# Patient Record
Sex: MALE | Race: BLACK OR AFRICAN AMERICAN | NOT HISPANIC OR LATINO | Employment: OTHER | URBAN - METROPOLITAN AREA
[De-identification: names, ages, dates, MRNs, and addresses within clinical notes are randomized per-mention and may not be internally consistent; named-entity substitution may affect disease eponyms.]

---

## 2017-01-13 ENCOUNTER — LAB VISIT (OUTPATIENT)
Dept: LAB | Facility: HOSPITAL | Age: 82
End: 2017-01-13
Attending: INTERNAL MEDICINE
Payer: MEDICARE

## 2017-01-13 ENCOUNTER — OFFICE VISIT (OUTPATIENT)
Dept: INTERNAL MEDICINE | Facility: CLINIC | Age: 82
End: 2017-01-13
Payer: MEDICARE

## 2017-01-13 ENCOUNTER — IMMUNIZATION (OUTPATIENT)
Dept: INTERNAL MEDICINE | Facility: CLINIC | Age: 82
End: 2017-01-13
Payer: MEDICARE

## 2017-01-13 VITALS
DIASTOLIC BLOOD PRESSURE: 50 MMHG | SYSTOLIC BLOOD PRESSURE: 110 MMHG | BODY MASS INDEX: 27.14 KG/M2 | HEIGHT: 66 IN | HEART RATE: 64 BPM | WEIGHT: 168.88 LBS

## 2017-01-13 DIAGNOSIS — R09.89 LEFT CAROTID BRUIT: ICD-10-CM

## 2017-01-13 DIAGNOSIS — E78.5 HYPERLIPIDEMIA, UNSPECIFIED HYPERLIPIDEMIA TYPE: ICD-10-CM

## 2017-01-13 DIAGNOSIS — I15.2 HYPERTENSION ASSOCIATED WITH DIABETES: Chronic | ICD-10-CM

## 2017-01-13 DIAGNOSIS — I25.10 CORONARY ARTERY DISEASE INVOLVING NATIVE CORONARY ARTERY OF NATIVE HEART WITHOUT ANGINA PECTORIS: ICD-10-CM

## 2017-01-13 DIAGNOSIS — N18.30 CKD (CHRONIC KIDNEY DISEASE), STAGE III: ICD-10-CM

## 2017-01-13 DIAGNOSIS — I50.32 CHRONIC DIASTOLIC HEART FAILURE: ICD-10-CM

## 2017-01-13 DIAGNOSIS — F02.80 DEMENTIA ASSOCIATED WITH OTHER UNDERLYING DISEASE WITHOUT BEHAVIORAL DISTURBANCE: Chronic | ICD-10-CM

## 2017-01-13 DIAGNOSIS — C61 PROSTATE CANCER: ICD-10-CM

## 2017-01-13 DIAGNOSIS — Z76.0 PRESCRIPTION REFILL: ICD-10-CM

## 2017-01-13 DIAGNOSIS — Z95.1 S/P CABG X 1: ICD-10-CM

## 2017-01-13 DIAGNOSIS — Z00.00 ANNUAL PHYSICAL EXAM: Primary | ICD-10-CM

## 2017-01-13 DIAGNOSIS — Z23 NEED FOR PNEUMOCOCCAL VACCINATION: ICD-10-CM

## 2017-01-13 DIAGNOSIS — I25.10 CORONARY ARTERY DISEASE DUE TO LIPID RICH PLAQUE: ICD-10-CM

## 2017-01-13 DIAGNOSIS — I25.83 CORONARY ARTERY DISEASE DUE TO LIPID RICH PLAQUE: ICD-10-CM

## 2017-01-13 DIAGNOSIS — E11.59 HYPERTENSION ASSOCIATED WITH DIABETES: Chronic | ICD-10-CM

## 2017-01-13 DIAGNOSIS — Z00.00 ANNUAL PHYSICAL EXAM: ICD-10-CM

## 2017-01-13 LAB
ALBUMIN SERPL BCP-MCNC: 2.9 G/DL
ALP SERPL-CCNC: 135 U/L
ALT SERPL W/O P-5'-P-CCNC: 6 U/L
ANION GAP SERPL CALC-SCNC: 6 MMOL/L
AST SERPL-CCNC: 15 U/L
BASOPHILS # BLD AUTO: 0.05 K/UL
BASOPHILS NFR BLD: 1 %
BILIRUB SERPL-MCNC: 1 MG/DL
BILIRUB UR QL STRIP: NEGATIVE
BUN SERPL-MCNC: 16 MG/DL
CALCIUM SERPL-MCNC: 9.2 MG/DL
CHLORIDE SERPL-SCNC: 104 MMOL/L
CHOLEST/HDLC SERPL: 2.6 {RATIO}
CLARITY UR REFRACT.AUTO: CLEAR
CO2 SERPL-SCNC: 28 MMOL/L
COLOR UR AUTO: YELLOW
CREAT SERPL-MCNC: 1.7 MG/DL
CREAT UR-MCNC: 78 MG/DL
DIFFERENTIAL METHOD: ABNORMAL
EOSINOPHIL # BLD AUTO: 0.2 K/UL
EOSINOPHIL NFR BLD: 3.6 %
ERYTHROCYTE [DISTWIDTH] IN BLOOD BY AUTOMATED COUNT: 14.5 %
EST. GFR  (AFRICAN AMERICAN): 39.6 ML/MIN/1.73 M^2
EST. GFR  (NON AFRICAN AMERICAN): 34.2 ML/MIN/1.73 M^2
GLUCOSE SERPL-MCNC: 90 MG/DL
GLUCOSE UR QL STRIP: NEGATIVE
HCT VFR BLD AUTO: 36.4 %
HDL/CHOLESTEROL RATIO: 38.4 %
HDLC SERPL-MCNC: 112 MG/DL
HDLC SERPL-MCNC: 43 MG/DL
HGB BLD-MCNC: 11.5 G/DL
HGB UR QL STRIP: NEGATIVE
KETONES UR QL STRIP: NEGATIVE
LDLC SERPL CALC-MCNC: 53 MG/DL
LEUKOCYTE ESTERASE UR QL STRIP: NEGATIVE
LYMPHOCYTES # BLD AUTO: 1.2 K/UL
LYMPHOCYTES NFR BLD: 24.4 %
MCH RBC QN AUTO: 29.1 PG
MCHC RBC AUTO-ENTMCNC: 31.6 %
MCV RBC AUTO: 92 FL
MICROALBUMIN UR DL<=1MG/L-MCNC: 149 UG/ML
MICROALBUMIN/CREATININE RATIO: 191 UG/MG
MONOCYTES # BLD AUTO: 0.5 K/UL
MONOCYTES NFR BLD: 10.7 %
NEUTROPHILS # BLD AUTO: 3 K/UL
NEUTROPHILS NFR BLD: 60.3 %
NITRITE UR QL STRIP: NEGATIVE
NONHDLC SERPL-MCNC: 69 MG/DL
PH UR STRIP: 5 [PH] (ref 5–8)
PLATELET # BLD AUTO: 167 K/UL
PMV BLD AUTO: 11 FL
POTASSIUM SERPL-SCNC: 4.2 MMOL/L
PROT SERPL-MCNC: 6.8 G/DL
PROT UR QL STRIP: ABNORMAL
RBC # BLD AUTO: 3.95 M/UL
SODIUM SERPL-SCNC: 138 MMOL/L
SP GR UR STRIP: 1.01 (ref 1–1.03)
TRIGL SERPL-MCNC: 80 MG/DL
URN SPEC COLLECT METH UR: ABNORMAL
UROBILINOGEN UR STRIP-ACNC: NEGATIVE EU/DL
WBC # BLD AUTO: 4.96 K/UL

## 2017-01-13 PROCEDURE — 99999 PR PBB SHADOW E&M-EST. PATIENT-LVL IV: CPT | Mod: PBBFAC,,, | Performed by: INTERNAL MEDICINE

## 2017-01-13 PROCEDURE — 99397 PER PM REEVAL EST PAT 65+ YR: CPT | Mod: S$GLB,,, | Performed by: INTERNAL MEDICINE

## 2017-01-13 PROCEDURE — G0009 ADMIN PNEUMOCOCCAL VACCINE: HCPCS | Mod: S$GLB,,, | Performed by: INTERNAL MEDICINE

## 2017-01-13 PROCEDURE — 80061 LIPID PANEL: CPT

## 2017-01-13 PROCEDURE — 80053 COMPREHEN METABOLIC PANEL: CPT

## 2017-01-13 PROCEDURE — 83036 HEMOGLOBIN GLYCOSYLATED A1C: CPT

## 2017-01-13 PROCEDURE — 90662 IIV NO PRSV INCREASED AG IM: CPT | Mod: S$GLB,,, | Performed by: INTERNAL MEDICINE

## 2017-01-13 PROCEDURE — G0008 ADMIN INFLUENZA VIRUS VAC: HCPCS | Mod: S$GLB,,, | Performed by: INTERNAL MEDICINE

## 2017-01-13 PROCEDURE — 99499 UNLISTED E&M SERVICE: CPT | Mod: S$GLB,,, | Performed by: INTERNAL MEDICINE

## 2017-01-13 PROCEDURE — 85025 COMPLETE CBC W/AUTO DIFF WBC: CPT

## 2017-01-13 PROCEDURE — 90670 PCV13 VACCINE IM: CPT | Mod: S$GLB,,, | Performed by: INTERNAL MEDICINE

## 2017-01-13 PROCEDURE — 36415 COLL VENOUS BLD VENIPUNCTURE: CPT

## 2017-01-13 RX ORDER — DONEPEZIL HYDROCHLORIDE 5 MG/1
5 TABLET, FILM COATED ORAL DAILY
Qty: 90 TABLET | Refills: 3 | Status: SHIPPED | OUTPATIENT
Start: 2017-01-13 | End: 2018-01-13

## 2017-01-13 RX ORDER — CARVEDILOL 12.5 MG/1
12.5 TABLET ORAL 2 TIMES DAILY WITH MEALS
Qty: 180 TABLET | Refills: 3 | Status: SHIPPED | OUTPATIENT
Start: 2017-01-13 | End: 2018-01-13

## 2017-01-13 RX ORDER — FUROSEMIDE 40 MG/1
40 TABLET ORAL 2 TIMES DAILY
Qty: 180 TABLET | Refills: 3 | Status: SHIPPED | OUTPATIENT
Start: 2017-01-13 | End: 2018-01-13

## 2017-01-13 RX ORDER — DOXAZOSIN 2 MG/1
2 TABLET ORAL NIGHTLY
Qty: 90 TABLET | Refills: 3 | Status: SHIPPED | OUTPATIENT
Start: 2017-01-13 | End: 2018-01-13

## 2017-01-13 RX ORDER — HYDRALAZINE HYDROCHLORIDE 50 MG/1
50 TABLET, FILM COATED ORAL EVERY 12 HOURS
Qty: 180 TABLET | Refills: 3 | Status: SHIPPED | OUTPATIENT
Start: 2017-01-13 | End: 2017-03-13

## 2017-01-13 NOTE — PROGRESS NOTES
CHIEF COMPLAINT:  Followup.    HISTORY OF PRESENT ILLNESS:  The patient is a 89-year-old gentleman with a   history of coronary artery disease, status post stent of the left circumflex and   RCA in 2011, as well as CABG in 2001, for 3-vessel disease.  The patient has   not followed up with Cardiology yet this year.  In regards to the patient's   hyperlipidemia, his last lipid panel was in June 2016.  In regards to the   patient's CKD, his last BUN and creatinine was in June 2016, as well.    REVIEW OF SYSTEMS:  The patient reports feeling well.  No visual change.  No   auditory change.  No dysphagia.  No chest pain.  No shortness of breath.  He   does report breathing heavy with walking.  He sleeps in a recliner.  This is   because of watching TV.  No nausea or vomiting.  No abdominal pain.  No bowel   changes.  He does have a history of prostate cancer.  His son reports he may   have a little trouble stopping because sometimes he leaks.  No weakness in the   arms or legs.  No numbness or tingling in the arms or legs.    PHYSICAL EXAMINATION:  GENERAL APPEARANCE:  No acute distress.  HEENT:  Conjunctivae are clear.  The patient has bilateral lens replacements.    TMs are clear.  Nasal septum is midline without discharge.  Oropharynx is   without erythema.  The patient does have dentures in place.  Trachea is midline   without JVD and without thyromegaly.  PULMONARY:  Good inspiratory and expiratory breath sounds are heard.  Lungs are   clear to auscultation.  CARDIOVASCULAR:  S1 and S2.  2+ carotid pulses.  To the left, he appeared to   have a bruit.  EXTREMITIES:  With 1-2+ edema.  ABDOMEN:  Nontender, nondistended and without hepatosplenomegaly.    ASSESSMENT:  1.  Annual exam.  2.  Hypertension.  3.  Dementia.  4.  CKD stage III.  5.  Prostate cancer.  6.  Hyperlipidemia.  7.  Left carotid bruit.  8.  Coronary artery disease, status post CABG as well as stent.    PLAN:  We will put the patient in for a flu shot  as well as a Prevnar-13.  We   will schedule a referral to Cardiology and Urology.  We will also put him in for   CBC, CMP, lipid panel, A1c, urine for microalbumin and carotid ultrasound.      ANUJ/ION  dd: 01/13/2017 13:12:14 (CST)  td: 01/14/2017 12:16:45 (CST)  Doc ID   #[id number]  Job ID #[id number]    CC:

## 2017-01-13 NOTE — MR AVS SNAPSHOT
[facility] - [facility] Medicine  [address]  [facility] [address]  Phone: [phone]  Fax: [phone]                  Orlando Tran Jr.   2017 11:30 AM   Office Visit    Description:  Male : 1924   Provider:  Jordon Spence MD   Department:  [facility] - Internal Medicine           Reason for Visit     Follow-up           Diagnoses this Visit        Comments    Annual physical exam    -  Primary     Hypertension associated with diabetes         Dementia associated with other underlying disease without behavioral disturbance         CKD (chronic kidney disease), stage III         Prostate cancer         Hyperlipidemia, unspecified hyperlipidemia type         Left carotid bruit         Coronary artery disease involving native coronary artery of native heart without angina pectoris         Chronic diastolic heart failure         Need for pneumococcal vaccination         Coronary artery disease due to lipid rich plaque         S/P CABG x 1         Prescription refill                To Do List           Future Appointments        Provider Department Dept Phone    2/3/2017 11:00 AM [facility], [facility] [phone]      Goals (5 Years of Data)     None       These Medications        Disp Refills Start End    carvedilol (COREG) 12.5 MG tablet 180 tablet 3 2017    Take 1 tablet (12.5 mg total) by mouth 2 (two) times daily with meals. - Oral    Pharmacy: [facility], [place] - [address] Ph #: [phone]       doxazosin (CARDURA) 2 MG tablet 90 tablet 3 2017    Take 1 tablet (2 mg total) by mouth every evening. - Oral    Pharmacy: [facility], [place] - [address] Ph #: [phone]       furosemide (LASIX) 40 MG tablet 180 tablet 3 2017    Take 1 tablet (40 mg total) by mouth 2 (two) times daily. - Oral    Pharmacy: [facility], [place] - [address] Ph #:  [phone]       donepezil (ARICEPT) 5 MG tablet 90 tablet 3 1/13/2017 1/13/2018    Take 1 tablet (5 mg total) by mouth once daily. - Oral    Pharmacy: [facility], [place] - [address] [facility] [address] Ph #: [phone]       hydrALAZINE (APRESOLINE) 50 MG tablet 180 tablet 3 1/13/2017 1/13/2018    Take 1 tablet (50 mg total) by mouth every 12 (twelve) hours. - Oral    Pharmacy: [facility], [place] - [address] [facility] [address] Ph #: [phone]         [facility]     [facility] On Call Nurse Care Line - 24/7 Assistance  Registered nurses in the [facility] On Call Center provide clinical advisement, health education, appointment booking, and other advisory services.  Call for this free service at [phone].             Medications           Message regarding Medications     Verify the changes and/or additions to your medication regime listed below are the same as discussed with your clinician today.  If any of these changes or additions are incorrect, please notify your healthcare provider.             Verify that the below list of medications is an accurate representation of the medications you are currently taking.  If none reported, the list may be blank. If incorrect, please contact your healthcare provider. Carry this list with you in case of emergency.           Current Medications     allopurinol (ZYLOPRIM) 300 MG tablet Take 1 tablet (300 mg total) by mouth Daily.    aspirin 81 mg Tab Take 1 tablet by mouth.    bicalutamide (CASODEX) 50 MG Tab Take 1 tablet (50 mg total) by mouth once daily.    carvedilol (COREG) 12.5 MG tablet Take 1 tablet (12.5 mg total) by mouth 2 (two) times daily with meals.    donepezil (ARICEPT) 5 MG tablet Take 1 tablet (5 mg total) by mouth once daily.    doxazosin (CARDURA) 2 MG tablet Take 1 tablet (2 mg total) by mouth every evening.    furosemide (LASIX) 40 MG tablet Take 1 tablet (40 mg total) by mouth 2 (two) times daily.    hydrALAZINE (APRESOLINE) 50 MG tablet  "Take 1 tablet (50 mg total) by mouth every 12 (twelve) hours.    simvastatin (ZOCOR) 40 MG tablet Take 1 tablet (40 mg total) by mouth every evening.    citalopram (CELEXA) 10 MG tablet Take 1 tablet (10 mg total) by mouth once daily.           Clinical Reference Information           Vital Signs - Last Recorded  Most recent update: 1/13/2017 12:15 PM by Jordon Spence MD    BP Pulse Ht Wt BMI    (!) 110/50 (BP Location: Right arm, Patient Position: Sitting, BP Method: Manual) 64 5' 6" (1.676 m) 76.6 kg (168 lb 14 oz) 27.26 kg/m2      Blood Pressure          Most Recent Value    BP  (!)  110/50      Allergies as of 1/13/2017     No Known Drug Allergies      Immunizations Administered on Date of Encounter - 1/13/2017     Name Date Dose VIS Date Route    Influenza - High Dose 1/13/2017 0.5 mL 8/7/2015 Intramuscular    Pneumococcal Conjugate - 13 Valent  Incomplete 0.5 mL 11/5/2015 Intramuscular      Orders Placed During Today's Visit      Normal Orders This Visit    Ambulatory Referral to Cardiology     Ambulatory Referral to Urology     Microalbumin/creatinine urine ratio     Pneumococcal Conjugate Vaccine (13 Valent) (IM)     Urinalysis     Future Labs/Procedures Expected by Expires    CBC auto differential  1/13/2017 1/13/2018    Hemoglobin A1c  1/13/2017 1/13/2018    Lipid panel  1/13/2017 1/13/2018    US Carotid Bilateral  1/13/2017 1/13/2018    Comprehensive metabolic panel  7/16/2017 (Approximate) 1/13/2018      [facility] Sign-Up     Activating your [facility] account is as easy as 1-2-3!     1) Visit [email or web address], select Sign Up Now, enter this activation code and your date of birth, then select Next.  [email or web address]  Expires: 2/27/2017 12:35 PM      2) Create a username and password to use when you visit [facility] in the future and select a security question in case you lose your password and select Next.    3) Enter your e-mail address and click Sign Up!    Additional Information  If you have questions, " please e-mail [email or web address] or call [phone] to talk to our [facility] staff. Remember, [facility] is NOT to be used for urgent needs. For medical emergencies, dial 911.

## 2017-01-14 DIAGNOSIS — N18.3 CKD (CHRONIC KIDNEY DISEASE), STAGE 3 (MODERATE): Primary | ICD-10-CM

## 2017-01-14 LAB
ESTIMATED AVG GLUCOSE: 117 MG/DL
HBA1C MFR BLD HPLC: 5.7 %

## 2017-01-16 ENCOUNTER — TELEPHONE (OUTPATIENT)
Dept: NEPHROLOGY | Facility: CLINIC | Age: 82
End: 2017-01-16

## 2017-01-16 ENCOUNTER — TELEPHONE (OUTPATIENT)
Dept: INTERNAL MEDICINE | Facility: CLINIC | Age: 82
End: 2017-01-16

## 2017-01-16 NOTE — TELEPHONE ENCOUNTER
----- Message from Alma Corado sent at 1/16/2017  2:01 PM CST -----  Contact: pt  [phone]  [facility]  - pt has an appt on 3/13,  Labs order are  needed  - call    Back   number [phone]  thanks        Contacted the pt son prior to his appt on the 3/13/17. Also i schedule his dad labs appt 3/08/17 also prior to his appt.

## 2017-01-16 NOTE — TELEPHONE ENCOUNTER
----- Message from Jordon Spence MD sent at 1/14/2017  7:53 PM CST -----  Please contact patient's son. His father's blood tests showed that his kidneys do not work as well as they should be. He had seen Dr. Self in the past for this. I would like him to go back . Referral is in.

## 2017-02-03 ENCOUNTER — HOSPITAL ENCOUNTER (OUTPATIENT)
Dept: RADIOLOGY | Facility: HOSPITAL | Age: 82
Discharge: HOME | DRG: 441 | End: 2017-02-03
Attending: INTERNAL MEDICINE
Payer: MEDICARE

## 2017-02-03 ENCOUNTER — OFFICE VISIT (OUTPATIENT)
Dept: INTERNAL MEDICINE | Facility: CLINIC | Age: 82
DRG: 441 | End: 2017-02-03
Payer: MEDICARE

## 2017-02-03 VITALS
DIASTOLIC BLOOD PRESSURE: 68 MMHG | BODY MASS INDEX: 28.03 KG/M2 | HEART RATE: 74 BPM | HEIGHT: 66 IN | WEIGHT: 174.38 LBS | SYSTOLIC BLOOD PRESSURE: 134 MMHG

## 2017-02-03 DIAGNOSIS — E78.2 COMBINED HYPERLIPIDEMIA ASSOCIATED WITH TYPE 2 DIABETES MELLITUS: ICD-10-CM

## 2017-02-03 DIAGNOSIS — C61 PROSTATE CANCER: ICD-10-CM

## 2017-02-03 DIAGNOSIS — I87.2 VENOUS INSUFFICIENCY OF LOWER EXTREMITY, UNSPECIFIED LATERALITY: ICD-10-CM

## 2017-02-03 DIAGNOSIS — I25.10 CORONARY ARTERY DISEASE INVOLVING NATIVE CORONARY ARTERY OF NATIVE HEART WITHOUT ANGINA PECTORIS: ICD-10-CM

## 2017-02-03 DIAGNOSIS — N18.30 CKD (CHRONIC KIDNEY DISEASE), STAGE III: ICD-10-CM

## 2017-02-03 DIAGNOSIS — F02.80 DEMENTIA ASSOCIATED WITH OTHER UNDERLYING DISEASE WITHOUT BEHAVIORAL DISTURBANCE: Chronic | ICD-10-CM

## 2017-02-03 DIAGNOSIS — E11.9 TYPE 2 DIABETES MELLITUS WITHOUT RETINOPATHY: ICD-10-CM

## 2017-02-03 DIAGNOSIS — N18.30 CONTROLLED TYPE 2 DIABETES MELLITUS WITH STAGE 3 CHRONIC KIDNEY DISEASE, WITHOUT LONG-TERM CURRENT USE OF INSULIN: ICD-10-CM

## 2017-02-03 DIAGNOSIS — M85.80 OSTEOPENIA: ICD-10-CM

## 2017-02-03 DIAGNOSIS — E11.22 CONTROLLED TYPE 2 DIABETES MELLITUS WITH STAGE 3 CHRONIC KIDNEY DISEASE, WITHOUT LONG-TERM CURRENT USE OF INSULIN: ICD-10-CM

## 2017-02-03 DIAGNOSIS — I27.9 PULMONARY HEART DISEASE: ICD-10-CM

## 2017-02-03 DIAGNOSIS — E88.09 HYPOALBUMINEMIA DUE TO PROTEIN-CALORIE MALNUTRITION: ICD-10-CM

## 2017-02-03 DIAGNOSIS — Z00.00 ENCOUNTER FOR PREVENTIVE HEALTH EXAMINATION: Primary | ICD-10-CM

## 2017-02-03 DIAGNOSIS — R09.89 LEFT CAROTID BRUIT: ICD-10-CM

## 2017-02-03 DIAGNOSIS — R09.89 PROMINENT AORTA: ICD-10-CM

## 2017-02-03 DIAGNOSIS — I50.32 CHRONIC DIASTOLIC HEART FAILURE: ICD-10-CM

## 2017-02-03 DIAGNOSIS — I77.9 BILATERAL CAROTID ARTERY DISEASE: ICD-10-CM

## 2017-02-03 DIAGNOSIS — Z95.1 S/P CABG X 1: ICD-10-CM

## 2017-02-03 DIAGNOSIS — E11.69 COMBINED HYPERLIPIDEMIA ASSOCIATED WITH TYPE 2 DIABETES MELLITUS: ICD-10-CM

## 2017-02-03 DIAGNOSIS — E55.9 VITAMIN D INSUFFICIENCY: ICD-10-CM

## 2017-02-03 DIAGNOSIS — E46 HYPOALBUMINEMIA DUE TO PROTEIN-CALORIE MALNUTRITION: ICD-10-CM

## 2017-02-03 DIAGNOSIS — I15.2 HYPERTENSION ASSOCIATED WITH DIABETES: Chronic | ICD-10-CM

## 2017-02-03 DIAGNOSIS — E11.59 HYPERTENSION ASSOCIATED WITH DIABETES: Chronic | ICD-10-CM

## 2017-02-03 PROCEDURE — G0439 PPPS, SUBSEQ VISIT: HCPCS | Mod: S$GLB,,, | Performed by: NURSE PRACTITIONER

## 2017-02-03 PROCEDURE — 99999 PR PBB SHADOW E&M-EST. PATIENT-LVL IV: CPT | Mod: PBBFAC,,, | Performed by: NURSE PRACTITIONER

## 2017-02-03 PROCEDURE — 93880 EXTRACRANIAL BILAT STUDY: CPT | Mod: 26,,, | Performed by: RADIOLOGY

## 2017-02-03 PROCEDURE — 99499 UNLISTED E&M SERVICE: CPT | Mod: S$GLB,,, | Performed by: NURSE PRACTITIONER

## 2017-02-03 NOTE — PROGRESS NOTES
"Orlando Tran presented for a  Medicare AWV and comprehensive Health Risk Assessment today. The following components were reviewed and updated:    · Medical history  · Family History  · Social history  · Allergies and Current Medications  · Health Risk Assessment  · Health Maintenance  · Care Team     ** See Completed Assessments for Annual Wellness Visit within the encounter summary.**       The following assessments were completed:  · Living Situation  · CAGE  · Depression Screening  · Timed Get Up and Go  · Whisper Test  · Cognitive Function Screening  · Nutrition Screening  · ADL Screening  · PAQ Screening    Vitals:    02/03/17 1119   BP: 134/68   BP Location: Left arm   Patient Position: Sitting   Pulse: 74   Weight: 79.1 kg (174 lb 6.1 oz)   Height: 5' 6" (1.676 m)     Body mass index is 28.15 kg/(m^2).  Physical Exam   Constitutional: He appears well-developed and well-nourished. No distress.   In wheel chair at visit, ambulates at home   HENT:   Head: Normocephalic and atraumatic.   Eyes: No scleral icterus.   Neck: Normal range of motion.   Cardiovascular: Normal rate, regular rhythm and normal heart sounds.    Pulses:       Dorsalis pedis pulses are 1+ on the right side, and 1+ on the left side.   Pulmonary/Chest: Effort normal and breath sounds normal. No respiratory distress.   cough   Abdominal: Soft. Bowel sounds are normal. He exhibits no distension.   Musculoskeletal: Normal range of motion. He exhibits edema.        Right foot: There is no deformity.        Left foot: There is no deformity.   2+ edema noted to bilateral ankles and feet   Feet:   Right Foot:   Protective Sensation: 6 sites tested. 4 sites sensed.   Skin Integrity: Positive for callus. Negative for ulcer, blister or skin breakdown.   Left Foot:   Protective Sensation: 6 sites tested. 3 sites sensed.   Skin Integrity: Negative for ulcer, blister or skin breakdown.   Neurological: He is alert.   Memory impairment noted   Skin: Skin is " warm and dry. He is not diaphoretic.   Psychiatric: He has a normal mood and affect. His behavior is normal.         Diagnoses and health risks identified today and associated recommendations/orders:    1. Encounter for preventive health examination  Hearing screen abnormal, hx of dementia (min-cog not completed), to appointment in wheel-chair (get-up-and go not completed).  Preventative health recommendations reviewed    2. Chronic diastolic heart failure  Stable.   Controlled with current medical therapy  Followed by cardiology.     3. Pulmonary heart disease  Stable.   Controlled with current medical therapy  Followed by cardiology.     4. Coronary artery disease involving native coronary artery of native heart without angina pectoris  Stable. Hx of CABG and stent placement  Controlled with current medical therapy  Followed by cardiology    5. Controlled type 2 diabetes mellitus with stage 3 chronic kidney disease, without long-term current use of insulin  Stable.   Diet controlled  Followed by PCP.     6. Type 2 diabetes mellitus without retinopathy  Stable.   Diet controlled   Followed by optometry and PCP.     7. Hypertension associated with diabetes  Stable.   Controlled with current medical therapy  Followed by cardiology and PCP.     8. Combined hyperlipidemia associated with type 2 diabetes mellitus  Stable.   Controlled with current medical therapy  Followed by cardiology and PCP.     9. Prostate cancer  Stable.   Controlled with current medical therapy  Followed by urology.     10. Prominent aorta  Stable. Seen on CXR from 08/14/10  Followed by cardiology and PCP.     11. Hypoalbuminemia due to protein-calorie malnutrition  Stable.   Weight stable, appetite stable  Followed by PCP.     12. Bilateral carotid artery disease  Stable (40-59% stenosis of bandar, 1-39% stenosis of lica)  Seen on carotid us from 02/03/17  Controlled with current medical therapy  Followed by pcp.     13. CKD (chronic kidney  disease), stage III  Stable.   Followed by nephrology    14. Vitamin D insufficiency  Stable.   Followed by PCP.     15. Venous insufficiency of lower extremity, unspecified laterality  Stable.   Controlled with current medical therapy  Followed by cardiology and PCP.     16. Osteopenia  Stable.   Followed by PCP.     17. S/P CABG x 1 - [facility] many yrs ago  Stable.   Followed by cardiology    18. Dementia associated with other underlying disease without behavioral disturbance.   Stable   Controlled with current medical therapy  Followed by PCP    Provided Orlando with a 5-10 year written screening schedule and personal prevention plan. Recommendations were developed using the USPSTF age appropriate recommendations. Education, counseling, and referrals were provided as needed. After Visit Summary printed and given to patient which includes a list of additional screenings\tests needed.    Return in about 6 months (around 8/3/2017) for routine 6 month visit with your primary care provider or sooner if problems arise. .    Smitha Bragg, NP

## 2017-02-03 NOTE — PATIENT INSTRUCTIONS
Counseling and Referral of Other Preventative  (Italic type indicates deductible and co-insurance are waived)    Patient Name: Orlando Tran  Today's Date: 2/3/2017      SERVICE LIMITATIONS RECOMMENDATION    Vaccines    · Pneumococcal (once after 65)    · Influenza (annually)    · Hepatitis B (if medium/high risk)    · Prevnar 13      Hepatitis B medium/high risk factors:       - End-stage renal disease       - Hemophiliacs who received Factor VII or         IX concentrates       - Clients of institutions for the mentally             retarded       - Persons who live in the same house as          a HepB carrier       - Homosexual men       - Illicit injectable drug abusers     Pneumococcal: Due after 01/13/2018     Influenza: Done, repeat in one year     Hepatitis B: N/A : defer to PCP     Prevnar 13: Done 01/13/17    Prostate cancer screening (annually to age 75)     Prostate specific antigen (PSA) Shared decision making with Provider. Sometimes a co-pay may be required if the patient decides to have this test. The USPSTF no longer recommends prostate cancer screening routinely in medicine: followed by urology    Colorectal cancer screening (to age 75)    · Fecal occult blood test (annual)  · Flexible sigmoidoscopy (5y)  · Screening colonoscopy (10y)  · Barium enema   N/A : n/a    Diabetes self-management training (no USPSTF recommendations)  Requires referral by treating physician for patient with diabetes or renal disease. 10 hours of initial DSMT sessions of no less than 30 minutes each in a continuous 12-month period. 2 hours of follow-up DSMT in subsequent years.  N/A : under well control    Glaucoma screening (no USPSTF recommendation)  Diabetes mellitus, family history   , age 50 or over    American, age 65 or over  Last done 06/27/16, recommend to repeat every 1  year    Medical nutrition therapy for diabetes or renal disease (no recommended schedule)  Requires referral by treating  physician for patient with diabetes or renal disease or kidney transplant within the past 3 years.  Can be provided in same year as diabetes self-management training (DSMT), and CMS recommends medical nutrition therapy take place after DSMT. Up to 3 hours for initial year and 2 hours in subsequent years.  N/A : n/a    Cardiovascular screening blood tests (every 5 years)  · Fasting lipid panel  Order as a panel if possible  Last done 01/13/17, recommend to repeat every 1  year    Diabetes screening tests (at least every 3 years, Medicare covers annually or at 6-month intervals for prediabetic patients)  · Fasting blood sugar (FBS) or glucose tolerance test (GTT)  Patient must be diagnosed with one of the following:       - Hypertension       - Dyslipidemia       - Obesity (BMI 30kg/m2)       - Previous elevated impaired FBS or GTT       ... or any two of the following:       - Overweight (BMI 25 but <30)       - Family history of diabetes       - Age 65 or older       - History of gestational diabetes or birth of baby weighing more than 9 pounds  Last done 01/13/17, recommend to repeat every 1  year    Abdominal aortic aneurysm screening (once)  · Sonogram   Limited to patients who meet one of the following criteria:       - Men who are 65-75 years old and have smoked more than 100 cigarette in their lifetime       - Anyone with a family history of abdominal aortic aneurysm       - Anyone recommended for screening by the USPSTF  N/A : n/a    HIV screening (annually for increased risk patients)  · HIV-1 and HIV-2 by EIA, or ELLI, rapid antibody test or oral mucosa transudate  Patients must be at increased risk for HIV infection per USPSTF guidelines or pregnant. Tests covered annually for patient at increased risk or as requested by the patient. Pregnant patients may receive up to 3 tests during pregnancy.  Risks discussed, screening is not recommended    Smoking cessation counseling (up to 8 sessions per year)   Patients must be asymptomatic of tobacco-related conditions to receive as a preventative service.  n/a    Subsequent annual wellness visit  At least 12 months since last AWV  Return in one year     The following information is provided to all patients.  This information is to help you find resources for any of the problems found today that may be affecting your health:                Living healthy guide: www.[facility].[place].[facility]      Understanding Diabetes: www.diabetes.org      Eating healthy: www.cdc.gov/healthyweight      CDC home safety checklist: www.cdc.gov/steadi/patient.html      Agency on Aging: www.goea.[place].[facility]      Alcoholics anonymous (AA): www.aa.org      Physical Activity: www.opal.nih.gov/pi0nclj      Tobacco use: www.quitwithusla.org

## 2017-02-03 NOTE — MR AVS SNAPSHOT
[facility] - Internal Medicine  [address]  [facility] [address]  Phone: [phone]  Fax: [phone]                  Orlando Tran Jr.   2/3/2017 11:00 AM   Office Visit    Description:  Male : 1924   Provider:  ZEV GIBBS 2   Department:  [facility] - Internal Medicine           Reason for Visit     Health Risk Assessment           Diagnoses this Visit        Comments    Encounter for preventive health examination    -  Primary            To Do List           Future Appointments        Provider Department Dept Phone    2/3/2017 2:00 PM [facility] US 11 ALL [facility] [phone]    2017 11:30 AM Corby Pires MD [facility] - Cardiology [phone]    2017 1:30 PM Sachin Finley Jr., MD [facility] - Urology 4th Floor [phone]    3/8/2017 10:00 AM LAB, SAME DAY [facility] [phone]    3/8/2017 10:15 AM SPECIMEN, MAIN CAMPUS [facility] [phone]      Goals (5 Years of Data)     None      Follow-Up and Disposition     Return in about 6 months (around 8/3/2017) for routine 6 month visit with your primary care provider or sooner if problems arise. .      [facility]     [facility] Care [facility] -  Assistance  Registered nurses in the [facility] provide clinical advisement, health education, appointment booking, and other advisory services.  Call for this free service at [phone].             Medications           Message regarding Medications     Verify the changes and/or additions to your medication regime listed below are the same as discussed with your clinician today.  If any of these changes or additions are incorrect, please notify your healthcare provider.        STOP taking these medications     citalopram (CELEXA) 10 MG tablet Take 1 tablet (10 mg total) by mouth once daily.           Verify that the below list of medications is an accurate representation of the medications you are  "currently taking.  If none reported, the list may be blank. If incorrect, please contact your healthcare provider. Carry this list with you in case of emergency.           Current Medications     allopurinol (ZYLOPRIM) 300 MG tablet Take 1 tablet (300 mg total) by mouth Daily.    aspirin 81 mg Tab Take 1 tablet by mouth.    bicalutamide (CASODEX) 50 MG Tab Take 1 tablet (50 mg total) by mouth once daily.    carvedilol (COREG) 12.5 MG tablet Take 1 tablet (12.5 mg total) by mouth 2 (two) times daily with meals.    donepezil (ARICEPT) 5 MG tablet Take 1 tablet (5 mg total) by mouth once daily.    doxazosin (CARDURA) 2 MG tablet Take 1 tablet (2 mg total) by mouth every evening.    furosemide (LASIX) 40 MG tablet Take 1 tablet (40 mg total) by mouth 2 (two) times daily.    hydrALAZINE (APRESOLINE) 50 MG tablet Take 1 tablet (50 mg total) by mouth every 12 (twelve) hours.    simvastatin (ZOCOR) 40 MG tablet Take 1 tablet (40 mg total) by mouth every evening.           Clinical Reference Information           Your Vitals Were     BP Pulse Height Weight BMI    134/68 (BP Location: Left arm, Patient Position: Sitting) 74 5' 6" (1.676 m) 79.1 kg (174 lb 6.1 oz) 28.15 kg/m2      Blood Pressure          Most Recent Value    BP  134/68      Allergies as of 2/3/2017     No Known Drug Allergies      Immunizations Administered on Date of Encounter - 2/3/2017     None      [facility] Sign-Up     Activating your [facility] account is as easy as 1-2-3!     1) Visit [email or web address], select Sign Up Now, enter this activation code and your date of birth, then select Next.  [email or web address]  Expires: 2/27/2017 12:35 PM      2) Create a username and password to use when you visit [facility] in the future and select a security question in case you lose your password and select Next.    3) Enter your e-mail address and click Sign Up!    Additional Information  If you have questions, please e-mail [email or web address] or call [phone] to " talk to our [facility] staff. Remember, [facility] is NOT to be used for urgent needs. For medical emergencies, dial 911.         Instructions      Counseling and Referral of Other Preventative  (Italic type indicates deductible and co-insurance are waived)    Patient Name: Orlando Tran  Today's Date: 2/3/2017      SERVICE LIMITATIONS RECOMMENDATION    Vaccines    · Pneumococcal (once after 65)    · Influenza (annually)    · Hepatitis B (if medium/high risk)    · Prevnar 13      Hepatitis B medium/high risk factors:       - End-stage renal disease       - Hemophiliacs who received Factor VII or         IX concentrates       - Clients of institutions for the mentally             retarded       - Persons who live in the same house as          a HepB carrier       - Homosexual men       - Illicit injectable drug abusers     Pneumococcal: Due after 01/13/2018     Influenza: Done, repeat in one year     Hepatitis B: N/A : defer to PCP     Prevnar 13: Done 01/13/17    Prostate cancer screening (annually to age 75)     Prostate specific antigen (PSA) Shared decision making with Provider. Sometimes a co-pay may be required if the patient decides to have this test. The USPSTF no longer recommends prostate cancer screening routinely in medicine: followed by urology    Colorectal cancer screening (to age 75)    · Fecal occult blood test (annual)  · Flexible sigmoidoscopy (5y)  · Screening colonoscopy (10y)  · Barium enema   N/A : n/a    Diabetes self-management training (no USPSTF recommendations)  Requires referral by treating physician for patient with diabetes or renal disease. 10 hours of initial DSMT sessions of no less than 30 minutes each in a continuous 12-month period. 2 hours of follow-up DSMT in subsequent years.  N/A : under well control    Glaucoma screening (no USPSTF recommendation)  Diabetes mellitus, family history   , age 50 or over    American, age 65 or over  Last done 06/27/16,  recommend to repeat every 1  year    Medical nutrition therapy for diabetes or renal disease (no recommended schedule)  Requires referral by treating physician for patient with diabetes or renal disease or kidney transplant within the past 3 years.  Can be provided in same year as diabetes self-management training (DSMT), and CMS recommends medical nutrition therapy take place after DSMT. Up to 3 hours for initial year and 2 hours in subsequent years.  N/A : n/a    Cardiovascular screening blood tests (every 5 years)  · Fasting lipid panel  Order as a panel if possible  Last done 01/13/17, recommend to repeat every 1  year    Diabetes screening tests (at least every 3 years, Medicare covers annually or at 6-month intervals for prediabetic patients)  · Fasting blood sugar (FBS) or glucose tolerance test (GTT)  Patient must be diagnosed with one of the following:       - Hypertension       - Dyslipidemia       - Obesity (BMI 30kg/m2)       - Previous elevated impaired FBS or GTT       ... or any two of the following:       - Overweight (BMI 25 but <30)       - Family history of diabetes       - Age 65 or older       - History of gestational diabetes or birth of baby weighing more than 9 pounds  Last done 01/13/17, recommend to repeat every 1  year    Abdominal aortic aneurysm screening (once)  · Sonogram   Limited to patients who meet one of the following criteria:       - Men who are 65-75 years old and have smoked more than 100 cigarette in their lifetime       - Anyone with a family history of abdominal aortic aneurysm       - Anyone recommended for screening by the USPSTF  N/A : n/a    HIV screening (annually for increased risk patients)  · HIV-1 and HIV-2 by EIA, or ELLI, rapid antibody test or oral mucosa transudate  Patients must be at increased risk for HIV infection per USPSTF guidelines or pregnant. Tests covered annually for patient at increased risk or as requested by the patient. Pregnant patients may  receive up to 3 tests during pregnancy.  Risks discussed, screening is not recommended    Smoking cessation counseling (up to 8 sessions per year)  Patients must be asymptomatic of tobacco-related conditions to receive as a preventative service.  n/a    Subsequent annual wellness visit  At least 12 months since last AWV  Return in one year     The following information is provided to all patients.  This information is to help you find resources for any of the problems found today that may be affecting your health:                Living healthy guide: www.[facility].[place]      Understanding Diabetes: www.diabetes.org      Eating healthy: www.cdc.gov/healthyweight      CDC home safety checklist: www.cdc.gov/steadi/patient.html      Agency on Aging: www.goea.[place].[facility]      Alcoholics anonymous (AA): www.aa.org      Physical Activity: www.opal.nih.gov/qk5kdut      Tobacco use: www.quitwithusla.org          Language Assistance Services     ATTENTION: Language assistance services are available, free of charge. Please call [phone].      ATENCIÓN: Si habla español, tiene a marcelo disposición servicios gratuitos de asistencia lingüística. Llame al [phone].     CHÚ Ý: N?u b?n nói Ti?ng Vi?t, có các d?ch v? h? tr? ngôn ng? mi?n phí dành cho b?n. G?i s? [phone].         [facility] complies with applicable Federal civil rights laws and does not discriminate on the basis of race, color, national origin, age, disability, or sex.

## 2017-02-04 ENCOUNTER — HOSPITAL ENCOUNTER (INPATIENT)
Facility: HOSPITAL | Age: 82
LOS: 3 days | Discharge: HOME | DRG: 441 | End: 2017-02-07
Attending: EMERGENCY MEDICINE | Admitting: HOSPITALIST
Payer: MEDICARE

## 2017-02-04 DIAGNOSIS — E11.69 COMBINED HYPERLIPIDEMIA ASSOCIATED WITH TYPE 2 DIABETES MELLITUS: Primary | ICD-10-CM

## 2017-02-04 DIAGNOSIS — I51.7 CARDIOMEGALY: ICD-10-CM

## 2017-02-04 DIAGNOSIS — E78.2 COMBINED HYPERLIPIDEMIA ASSOCIATED WITH TYPE 2 DIABETES MELLITUS: Primary | ICD-10-CM

## 2017-02-04 DIAGNOSIS — E11.9 DM (DIABETES MELLITUS): ICD-10-CM

## 2017-02-04 DIAGNOSIS — E87.70 HYPERVOLEMIA, UNSPECIFIED HYPERVOLEMIA TYPE: ICD-10-CM

## 2017-02-04 DIAGNOSIS — R41.82 ALTERED MENTAL STATUS: ICD-10-CM

## 2017-02-04 DIAGNOSIS — Z76.0 PRESCRIPTION REFILL: ICD-10-CM

## 2017-02-04 DIAGNOSIS — K72.00 ACUTE LIVER FAILURE WITHOUT HEPATIC COMA: ICD-10-CM

## 2017-02-04 PROBLEM — R74.01 TRANSAMINITIS: Status: ACTIVE | Noted: 2017-02-04

## 2017-02-04 PROBLEM — G93.40 ACUTE ENCEPHALOPATHY: Status: ACTIVE | Noted: 2017-02-04

## 2017-02-04 LAB
ALBUMIN SERPL BCP-MCNC: 3 G/DL
ALP SERPL-CCNC: 148 U/L
ALT SERPL W/O P-5'-P-CCNC: 249 U/L
AMMONIA PLAS-SCNC: 52 UMOL/L
AMPHET+METHAMPHET UR QL: NEGATIVE
ANION GAP SERPL CALC-SCNC: 11 MMOL/L
APAP SERPL-MCNC: <3 UG/ML
AST SERPL-CCNC: 674 U/L
BACTERIA #/AREA URNS AUTO: ABNORMAL /HPF
BARBITURATES UR QL SCN>200 NG/ML: NEGATIVE
BASOPHILS # BLD AUTO: 0.02 K/UL
BASOPHILS NFR BLD: 0.4 %
BENZODIAZ UR QL SCN>200 NG/ML: NEGATIVE
BILIRUB SERPL-MCNC: 2 MG/DL
BILIRUB UR QL STRIP: NEGATIVE
BNP SERPL-MCNC: 2656 PG/ML
BUN SERPL-MCNC: 26 MG/DL
BZE UR QL SCN: NEGATIVE
CALCIUM SERPL-MCNC: 9.2 MG/DL
CANNABINOIDS UR QL SCN: NEGATIVE
CHLORIDE SERPL-SCNC: 107 MMOL/L
CLARITY UR REFRACT.AUTO: ABNORMAL
CO2 SERPL-SCNC: 19 MMOL/L
COLOR UR AUTO: YELLOW
CREAT SERPL-MCNC: 1.9 MG/DL
CREAT UR-MCNC: 210 MG/DL
DIFFERENTIAL METHOD: ABNORMAL
EOSINOPHIL # BLD AUTO: 0 K/UL
EOSINOPHIL NFR BLD: 0 %
ERYTHROCYTE [DISTWIDTH] IN BLOOD BY AUTOMATED COUNT: 14.5 %
EST. GFR  (AFRICAN AMERICAN): 34.6 ML/MIN/1.73 M^2
EST. GFR  (NON AFRICAN AMERICAN): 29.9 ML/MIN/1.73 M^2
ETHANOL UR-MCNC: <10 MG/DL
GLUCOSE SERPL-MCNC: 75 MG/DL
GLUCOSE UR QL STRIP: NEGATIVE
HCT VFR BLD AUTO: 39.2 %
HGB BLD-MCNC: 12.6 G/DL
HGB UR QL STRIP: ABNORMAL
HYALINE CASTS UR QL AUTO: 4 /LPF
INR PPP: 1.7
KETONES UR QL STRIP: ABNORMAL
LEUKOCYTE ESTERASE UR QL STRIP: NEGATIVE
LYMPHOCYTES # BLD AUTO: 1.3 K/UL
LYMPHOCYTES NFR BLD: 23.8 %
MAGNESIUM SERPL-MCNC: 1.8 MG/DL
MCH RBC QN AUTO: 29.4 PG
MCHC RBC AUTO-ENTMCNC: 32.1 %
MCV RBC AUTO: 91 FL
METHADONE UR QL SCN>300 NG/ML: NEGATIVE
MICROSCOPIC COMMENT: ABNORMAL
MONOCYTES # BLD AUTO: 0.6 K/UL
MONOCYTES NFR BLD: 11.7 %
NEUTROPHILS # BLD AUTO: 3.4 K/UL
NEUTROPHILS NFR BLD: 63.9 %
NITRITE UR QL STRIP: NEGATIVE
OPIATES UR QL SCN: NEGATIVE
PCP UR QL SCN>25 NG/ML: NEGATIVE
PH UR STRIP: 6 [PH] (ref 5–8)
PLATELET # BLD AUTO: 169 K/UL
PMV BLD AUTO: 10.6 FL
POTASSIUM SERPL-SCNC: 5.4 MMOL/L
PROT SERPL-MCNC: 7.1 G/DL
PROT UR QL STRIP: ABNORMAL
PROTHROMBIN TIME: 16.7 SEC
RBC # BLD AUTO: 4.29 M/UL
RBC #/AREA URNS AUTO: 2 /HPF (ref 0–4)
SODIUM SERPL-SCNC: 137 MMOL/L
SP GR UR STRIP: 1.02 (ref 1–1.03)
TOXICOLOGY INFORMATION: NORMAL
URN SPEC COLLECT METH UR: ABNORMAL
UROBILINOGEN UR STRIP-ACNC: 1 EU/DL
WBC # BLD AUTO: 5.3 K/UL
WBC #/AREA URNS AUTO: 4 /HPF (ref 0–5)

## 2017-02-04 PROCEDURE — 80307 DRUG TEST PRSMV CHEM ANLYZR: CPT

## 2017-02-04 PROCEDURE — 85025 COMPLETE CBC W/AUTO DIFF WBC: CPT

## 2017-02-04 PROCEDURE — 85610 PROTHROMBIN TIME: CPT

## 2017-02-04 PROCEDURE — 96374 THER/PROPH/DIAG INJ IV PUSH: CPT

## 2017-02-04 PROCEDURE — 82140 ASSAY OF AMMONIA: CPT

## 2017-02-04 PROCEDURE — 83735 ASSAY OF MAGNESIUM: CPT

## 2017-02-04 PROCEDURE — 81001 URINALYSIS AUTO W/SCOPE: CPT

## 2017-02-04 PROCEDURE — 99285 EMERGENCY DEPT VISIT HI MDM: CPT | Mod: ,,, | Performed by: EMERGENCY MEDICINE

## 2017-02-04 PROCEDURE — 80053 COMPREHEN METABOLIC PANEL: CPT

## 2017-02-04 PROCEDURE — 80329 ANALGESICS NON-OPIOID 1 OR 2: CPT

## 2017-02-04 PROCEDURE — 25000003 PHARM REV CODE 250: Performed by: INTERNAL MEDICINE

## 2017-02-04 PROCEDURE — 83880 ASSAY OF NATRIURETIC PEPTIDE: CPT

## 2017-02-04 PROCEDURE — 11000001 HC ACUTE MED/SURG PRIVATE ROOM

## 2017-02-04 PROCEDURE — 99232 SBSQ HOSP IP/OBS MODERATE 35: CPT | Mod: GC,,, | Performed by: HOSPITALIST

## 2017-02-04 PROCEDURE — 63600175 PHARM REV CODE 636 W HCPCS: Performed by: EMERGENCY MEDICINE

## 2017-02-04 PROCEDURE — 93005 ELECTROCARDIOGRAM TRACING: CPT

## 2017-02-04 PROCEDURE — 99285 EMERGENCY DEPT VISIT HI MDM: CPT | Mod: 25

## 2017-02-04 PROCEDURE — 93010 ELECTROCARDIOGRAM REPORT: CPT | Mod: ,,, | Performed by: INTERNAL MEDICINE

## 2017-02-04 RX ORDER — NAPROXEN SODIUM 220 MG/1
81 TABLET, FILM COATED ORAL DAILY
Status: DISCONTINUED | OUTPATIENT
Start: 2017-02-05 | End: 2017-02-04

## 2017-02-04 RX ORDER — SIMVASTATIN 40 MG/1
40 TABLET, FILM COATED ORAL NIGHTLY
Status: DISCONTINUED | OUTPATIENT
Start: 2017-02-04 | End: 2017-02-05

## 2017-02-04 RX ORDER — ALLOPURINOL 300 MG/1
300 TABLET ORAL DAILY
Status: DISCONTINUED | OUTPATIENT
Start: 2017-02-05 | End: 2017-02-05

## 2017-02-04 RX ORDER — IBUPROFEN 200 MG
24 TABLET ORAL
Status: DISCONTINUED | OUTPATIENT
Start: 2017-02-04 | End: 2017-02-07 | Stop reason: HOSPADM

## 2017-02-04 RX ORDER — INSULIN ASPART 100 [IU]/ML
0-5 INJECTION, SOLUTION INTRAVENOUS; SUBCUTANEOUS
Status: DISCONTINUED | OUTPATIENT
Start: 2017-02-04 | End: 2017-02-05

## 2017-02-04 RX ORDER — HYDRALAZINE HYDROCHLORIDE 50 MG/1
50 TABLET, FILM COATED ORAL EVERY 12 HOURS
Status: DISCONTINUED | OUTPATIENT
Start: 2017-02-04 | End: 2017-02-05

## 2017-02-04 RX ORDER — NAPROXEN SODIUM 220 MG/1
81 TABLET, FILM COATED ORAL DAILY
Status: DISCONTINUED | OUTPATIENT
Start: 2017-02-04 | End: 2017-02-04

## 2017-02-04 RX ORDER — GLUCAGON 1 MG
1 KIT INJECTION
Status: DISCONTINUED | OUTPATIENT
Start: 2017-02-04 | End: 2017-02-07 | Stop reason: HOSPADM

## 2017-02-04 RX ORDER — DOXAZOSIN 2 MG/1
2 TABLET ORAL NIGHTLY
Status: DISCONTINUED | OUTPATIENT
Start: 2017-02-04 | End: 2017-02-07 | Stop reason: HOSPADM

## 2017-02-04 RX ORDER — FUROSEMIDE 10 MG/ML
80 INJECTION INTRAMUSCULAR; INTRAVENOUS
Status: COMPLETED | OUTPATIENT
Start: 2017-02-04 | End: 2017-02-04

## 2017-02-04 RX ORDER — OXYCODONE HYDROCHLORIDE 5 MG/1
5 TABLET ORAL EVERY 6 HOURS PRN
Status: DISCONTINUED | OUTPATIENT
Start: 2017-02-04 | End: 2017-02-05

## 2017-02-04 RX ORDER — ACETAMINOPHEN 325 MG/1
650 TABLET ORAL EVERY 8 HOURS PRN
Status: DISCONTINUED | OUTPATIENT
Start: 2017-02-04 | End: 2017-02-05

## 2017-02-04 RX ORDER — NAPROXEN SODIUM 220 MG/1
81 TABLET, FILM COATED ORAL DAILY
Status: DISCONTINUED | OUTPATIENT
Start: 2017-02-05 | End: 2017-02-07 | Stop reason: HOSPADM

## 2017-02-04 RX ORDER — ONDANSETRON 8 MG/1
8 TABLET, ORALLY DISINTEGRATING ORAL EVERY 8 HOURS PRN
Status: DISCONTINUED | OUTPATIENT
Start: 2017-02-04 | End: 2017-02-07 | Stop reason: HOSPADM

## 2017-02-04 RX ORDER — FUROSEMIDE 40 MG/1
40 TABLET ORAL 2 TIMES DAILY
Status: DISCONTINUED | OUTPATIENT
Start: 2017-02-05 | End: 2017-02-05

## 2017-02-04 RX ORDER — CARVEDILOL 12.5 MG/1
12.5 TABLET ORAL 2 TIMES DAILY WITH MEALS
Status: DISCONTINUED | OUTPATIENT
Start: 2017-02-04 | End: 2017-02-07 | Stop reason: HOSPADM

## 2017-02-04 RX ORDER — ASPIRIN 325 MG
325 TABLET ORAL ONCE
Status: COMPLETED | OUTPATIENT
Start: 2017-02-04 | End: 2017-02-04

## 2017-02-04 RX ORDER — BICALUTAMIDE 50 MG/1
50 TABLET, FILM COATED ORAL DAILY
Status: DISCONTINUED | OUTPATIENT
Start: 2017-02-05 | End: 2017-02-05

## 2017-02-04 RX ORDER — IBUPROFEN 200 MG
16 TABLET ORAL
Status: DISCONTINUED | OUTPATIENT
Start: 2017-02-04 | End: 2017-02-07 | Stop reason: HOSPADM

## 2017-02-04 RX ADMIN — SIMVASTATIN 40 MG: 40 TABLET, FILM COATED ORAL at 10:02

## 2017-02-04 RX ADMIN — CARVEDILOL 12.5 MG: 12.5 TABLET, FILM COATED ORAL at 10:02

## 2017-02-04 RX ADMIN — FUROSEMIDE 80 MG: 10 INJECTION, SOLUTION INTRAMUSCULAR; INTRAVENOUS at 05:02

## 2017-02-04 RX ADMIN — DOXAZOSIN MESYLATE 2 MG: 2 TABLET ORAL at 10:02

## 2017-02-04 RX ADMIN — Medication 325 MG: at 10:02

## 2017-02-04 RX ADMIN — HYDRALAZINE HYDROCHLORIDE 50 MG: 50 TABLET ORAL at 10:02

## 2017-02-04 NOTE — ED PROVIDER NOTES
Encounter Date: 2/4/2017    SCRIBE #1 NOTE: I, Ulysses Silvestre, am scribing for, and in the presence of,  Dr. Monroe. I have scribed the entire note.       History     Chief Complaint   Patient presents with    Altered Mental Status     Patient with increased confusion, staring blankly off.  Patient with history of dementia, oriented self, place, not time, not event.     Review of patient's allergies indicates:   Allergen Reactions    No known drug allergies      HPI Comments: Time seen by provider: 2:51 PM    This is a 89 y.o. male with active co-morbidities of dementia, CAD, HLD, HTN, and CKD who presents to the ED for evaluation of an acute episode of altered mental status and generalized weakness first noticed at approximately 1PM. The patient's son saw him sitting in the car and when he approached him, the patient stared at him blankly and was not as responsive as per usual. The son attempted to help him exit the car, and the patient was unable to hold himself up due to lower extremity weakness. When EMS arrived they noted expressive aphasia, but equal  and no drift. At the time of evaluation in the ED, his son states that he has returned to his normal baseline. The patient denies any LOC, cp, SOB, fever, or headache. He is experiencing a non-productive cough for the past few days, and his son denies any noticeable facial droop at the time of the episode.       The history is provided by the patient, the EMS personnel and a relative.     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     3 v incl LIMA to LAD     Past Medical History Pertinent Negatives   Diagnosis Date Noted    Seizures 2/3/2017    Stroke 2/3/2017     Past Surgical  History   Procedure Laterality Date    Coronary artery bypass graft  12-13-01    Coronary stent placement       Family History   Problem Relation Age of Onset    Heart disease Neg Hx     Heart attack Neg Hx     Hypertension Neg Hx      Social History   Substance Use Topics    Smoking status: Former Smoker     Quit date: 11/14/1989    Smokeless tobacco: None    Alcohol use Yes      Comment: 1/5th of vodka every 10 days     Review of Systems   Constitutional: Negative for fever.   HENT: Negative for sore throat.    Respiratory: Negative for shortness of breath.    Cardiovascular: Negative for chest pain.   Gastrointestinal: Negative for nausea.   Genitourinary: Negative for dysuria.   Musculoskeletal: Negative for back pain.   Skin: Negative for rash.   Neurological: Positive for speech difficulty and weakness. Negative for syncope, facial asymmetry and headaches.        Altered mental status   Hematological: Does not bruise/bleed easily.       Physical Exam   Initial Vitals   BP Pulse Resp Temp SpO2   02/04/17 1440 02/04/17 1440 02/04/17 1440 -- 02/04/17 1440   151/96 77 16  98 %     Physical Exam    Nursing note and vitals reviewed.  Constitutional: He appears well-developed and well-nourished. He is not diaphoretic. No distress.   HENT:   Head: Normocephalic and atraumatic.   Eyes: EOM are normal.   Neck: Normal range of motion. Neck supple.   Cardiovascular: Normal rate, regular rhythm, normal heart sounds and intact distal pulses.   Pulmonary/Chest: Breath sounds normal. No stridor. No respiratory distress. He has no wheezes. He has no rhonchi. He has no rales.   Abdominal: Soft. There is no tenderness. There is no rebound and no guarding.   Musculoskeletal: Normal range of motion.   Neurological: He is alert and oriented to person, place, and time. He has normal strength. No cranial nerve deficit or sensory deficit.   Awake, alert, and oriented to person. Correctly identifies son at mental status  baseline as per son. Symmetric strength bilaterally.    Skin: Skin is warm and dry.   Psychiatric: He has a normal mood and affect. His behavior is normal.         ED Course   Procedures  Labs Reviewed   PROTIME-INR - Abnormal; Notable for the following:        Result Value    Prothrombin Time 16.7 (*)     INR 1.7 (*)     All other components within normal limits   B-TYPE NATRIURETIC PEPTIDE - Abnormal; Notable for the following:     BNP 2656 (*)     All other components within normal limits   URINALYSIS - Abnormal; Notable for the following:     Appearance, UA Hazy (*)     Protein, UA 3+ (*)     Ketones, UA Trace (*)     Occult Blood UA 2+ (*)     All other components within normal limits   CBC W/ AUTO DIFFERENTIAL - Abnormal; Notable for the following:     RBC 4.29 (*)     Hemoglobin 12.6 (*)     Hematocrit 39.2 (*)     All other components within normal limits   COMPREHENSIVE METABOLIC PANEL - Abnormal; Notable for the following:     Potassium 5.4 (*)     CO2 19 (*)     Creatinine 1.9 (*)     Albumin 3.0 (*)     Total Bilirubin 2.0 (*)     Alkaline Phosphatase 148 (*)      (*)      (*)     eGFR if  34.6 (*)     eGFR if non  29.9 (*)     All other components within normal limits   AMMONIA - Abnormal; Notable for the following:     Ammonia 52 (*)     All other components within normal limits   URINALYSIS MICROSCOPIC - Abnormal; Notable for the following:     Hyaline Casts, UA 4 (*)     All other components within normal limits   ACETAMINOPHEN LEVEL - Abnormal; Notable for the following:     Acetaminophen (Tylenol), Serum <3.0 (*)     All other components within normal limits    Narrative:     ADD ON TEST ACETM [id number] PER DR ROCIO MELVIN MD 2/4/17  6:45PM   TOXICOLOGY SCREEN, URINE, RANDOM (COMPLIANCE)   HEPATITIS PANEL, ACUTE   ACETAMINOPHEN LEVEL   ALCOHOL,MEDICAL (ETHANOL)   MAGNESIUM   ALCOHOL,MEDICAL (ETHANOL)     EKG Readings: (Independently Interpreted)   Sinus  rhythm at 73 bpm with LVH. There is some ST elevation in V2, V3, and V4 which is likely secondary to LVH and given that he is asymptomatic at the time the EKG was obtained I do not suspect this is secondary to a STEMI.        X-Rays:   Independently Interpreted Readings:   Chest X-Ray: Cardiomegaly. Pulmonary vascular congestion.    Head CT: No acute process.     Medical Decision Making:   History:   Old Medical Records: I decided to obtain old medical records.  Initial Assessment:   89 yo male presents with an episode of altered mental status and generalized weakness. My differential includes, but is not limited to metabolic/electrolyte disturbance, UTI, intracranial abnormality, and dehydration. His mental status is at baseline and he is asymptomatic at the time off initial assessment, and his son confirms this. Will evaluate with labs, imaging, and reassess.   Independently Interpreted Test(s):   I have ordered and independently interpreted X-rays - see prior notes.  I have ordered and independently interpreted EKG Reading(s) - see prior notes  Clinical Tests:   Lab Tests: Ordered and Reviewed  Radiological Study: Ordered and Reviewed  Medical Tests: Ordered and Reviewed            Scribe Attestation:   Scribe #1: I performed the above scribed service and the documentation accurately describes the services I performed. I attest to the accuracy of the note.    Attending Attestation:           Physician Attestation for Scribe:  Physician Attestation Statement for Scribe #1: I, Dr. Monroe, reviewed documentation, as scribed by Ulysses Silvestre in my presence, and it is both accurate and complete.         Attending ED Notes:   5:32 PM  Patient with markedly elevated BNP as well as evidence of acute hepatic injury. His CXR shows cardiomegaly with pulmonary vascular congestion. I suspect this patient has undiagnosed CHF and will initiate dieresis in the ED. Most recent ECHO in 2013 did not show evidence of this diagnosis.  I suspect the abnormalities on the CMP are secondary to hepatic congestion. Will admit.          ED Course     Clinical Impression:   The primary encounter diagnosis was Combined hyperlipidemia associated with type 2 diabetes mellitus. Diagnoses of Altered mental status and Cardiomegaly were also pertinent to this visit.    Disposition:   Disposition: Admitted  Condition: Serious       Valente Monroe MD  02/07/17 0249

## 2017-02-04 NOTE — IP AVS SNAPSHOT
[facility]  [address]  [facility] [address]  Phone: [phone]           Patient Discharge Instructions     Our goal is to set you up for success. This packet includes information on your condition, medications, and your home care. It will help you to care for yourself so you don't get sicker and need to go back to the hospital.     Please ask your nurse if you have any questions.        There are many details to remember when preparing to leave the hospital. Here is what you will need to do:    1. Take your medicine. If you are prescribed medications, review your Medication List in the following pages. You may have new medications to  at the pharmacy and others that you'll need to stop taking. Review the instructions for how and when to take your medications. Talk with your doctor or nurses if you are unsure of what to do.     2. Go to your follow-up appointments. Specific follow-up information is listed in the following pages. Your may be contacted by a transition nurse or clinical provider about future appointments. Be sure we have all of the phone numbers to reach you, if needed. Please contact your provider's office if you are unable to make an appointment.     3. Watch for warning signs. Your doctor or nurse will give you detailed warning signs to watch for and when to call for assistance. These instructions may also include educational information about your condition. If you experience any of warning signs to your health, call your doctor.               [facility]  Unless otherwise directed by your provider, please contact [facility], our nurse care line that is available for 24/7 assistance.     [phone] (toll-free)    Registered nurses in the [facility] provide clinical advisement, health education, appointment booking, and other advisory services.                    ** Verify the list of medication(s) below is accurate and up  to date. Carry this with you in case of emergency. If your medications have changed, please notify your healthcare provider.             Medication List      CHANGE how you take these medications        Additional Info    Begin Date AM Noon PM Bedtime    allopurinol 100 MG tablet   Commonly known as:  ZYLOPRIM   Quantity:  30 tablet   Refills:  1   Dose:  100 mg   What changed:    - medication strength  - how much to take   Notes to Patient:  Gout medication    Instructions:  Take 1 tablet (100 mg total) by mouth Daily.         Next dose 2/8 9 am                     CONTINUE taking these medications        Additional Info    Begin Date AM Noon PM Bedtime    aspirin 81 mg Tab   Refills:  0   Dose:  1 tablet   Notes to Patient:  Blood thinner    Last time this was given:  325 mg on 2/4/2017 10:12 PM   Instructions:  Take 1 tablet by mouth once daily.         Next dose 2/8 9 am                   carvedilol 12.5 MG tablet   Commonly known as:  COREG   Quantity:  180 tablet   Refills:  3   Dose:  12.5 mg   Notes to Patient:  Heart medication    Last time this was given:  12.5 mg on 2/7/2017  8:42 AM   Instructions:  Take 1 tablet (12.5 mg total) by mouth 2 (two) times daily with meals.                 Next dose 5 pm 2/7           donepezil 5 MG tablet   Commonly known as:  ARICEPT   Quantity:  90 tablet   Refills:  3   Dose:  5 mg   Notes to Patient:  Alzheimers medication    Last time this was given:  5 mg on 2/6/2017  8:45 PM   Instructions:  Take 1 tablet (5 mg total) by mouth once daily.         Next dose 2/8 9 am                   doxazosin 2 MG tablet   Commonly known as:  CARDURA   Quantity:  90 tablet   Refills:  3   Dose:  2 mg   Notes to Patient:  Prostate medication    Last time this was given:  2 mg on 2/6/2017  8:45 PM   Instructions:  Take 1 tablet (2 mg total) by mouth every evening.                 Next dose 5 pm 2/7           furosemide 40 MG tablet   Commonly known as:  LASIX   Quantity:  180 tablet    Refills:  3   Dose:  40 mg   Notes to Patient:  Fluid medication    Last time this was given:  40 mg on 2/7/2017  8:42 AM   Instructions:  Take 1 tablet (40 mg total) by mouth 2 (two) times daily.                 Next dose 5 pm 2/7           hydrALAZINE 50 MG tablet   Commonly known as:  APRESOLINE   Quantity:  180 tablet   Refills:  3   Dose:  50 mg   Notes to Patient:  Blood pressure medication    Last time this was given:  50 mg on 2/5/2017  8:52 AM   Instructions:  Take 1 tablet (50 mg total) by mouth every 12 (twelve) hours.                 Next dose 5 pm 2/7             STOP taking these medications     bicalutamide 50 MG Tab   Commonly known as:  CASODEX       simvastatin 40 MG tablet   Commonly known as:  ZOCOR            Where to Get Your Medications      These medications were sent to [facility] [place] - [address]  [address] [facility] [address]     Phone:  [phone]     allopurinol 100 MG tablet                  Please bring to all follow up appointments:    1. A copy of your discharge instructions.  2. All medicines you are currently taking in their original bottles.  3. Identification and insurance card.    Please arrive 15 minutes ahead of scheduled appointment time.    Please call 24 hours in advance if you must reschedule your appointment and/or time.        Your Scheduled Appointments     Feb 08, 2017 11:30 AM CST   Consult with MD Manuel Dejesus - Cardiology ([facility] )    [address]  [place] [address] [phone]            Feb 08, 2017  1:30 PM CST   Established Patient Visit with MD Manuel Guzman Jr. - Urology 4th Floor ([facility] )    [address]  [place] [address]   [phone]            Feb 14, 2017 11:00 AM CST   Hospital Follow Up with PERLA Cordova - [facility] ([facility])    [address]  [place] [address]    [phone]            Mar 08, 2017 10:00 AM CST   Non-Fasting Lab with LAB, SAME DAY   [facility])    [address] [facility] [address]   [phone]            Mar 08, 2017 10:15 AM CST   Urine with SPECIMEN, MAIN CAMPUS [facility] ([facility])    [address] [facility] [id number] [phone]              Follow-up Information     Schedule an appointment as soon as possible for a visit with Sachin Finley Jr, MD.    Specialty:  Urology    Contact information:    [address] [facility] [address]  [phone]          Follow up with [facility]. Go on 2/14/2017.    Specialty:  [facility]    Why:  11:00am    Contact information:    [address] [facility] [id number] [phone]    Additional information:    [facility]        Discharge Instructions     Future Orders    Activity as tolerated     Call MD for:  difficulty breathing or increased cough     Call MD for:  increased confusion or weakness     Call MD for:  persistent dizziness, light-headedness, or visual disturbances     Call MD for:  severe uncontrolled pain     Diet general     Questions:    Total calories:      Fat restriction, if any:      Protein restriction, if any:      Na restriction, if any:      Fluid restriction:      Additional restrictions:          Primary Diagnosis     Your primary diagnosis was:  Acute Liver Failure Without Hepatic Coma      Admission Information     Date & Time Provider Department CSN    2/4/2017  2:42 PM Reyes Mai MD [facility] [id number]      Care Providers     Provider Role Specialty Primary office phone    Reyes Mai MD Attending Provider Hospitalist [phone]    Janay Hinson MD Consulting Physician  Hepatology [phone]    Jefferosn Ayala MD Consulting Physician  Hepatology  [phone]    Ariadna Garcia MD Consulting Physician  Gastroenterology [phone]    Corbin Drew MD Consulting Physician  Hepatology [phone]    Yuridia Lee MD Team Attending  Hospitalist [phone]      Your Vitals Were     BP Pulse Temp Resp Height Weight    124/60 (BP Location: Left arm, Patient Position: Sitting, BP Method: Automatic) 54 97.8 °F (36.6 °C) (Oral) 18 5' 6" (1.676 m) 78 kg (172 lb)    SpO2 BMI             98% 27.76 kg/m2         Recent Lab Values        8/2/2007 2/5/2009 4/17/2011 2/7/2012 7/24/2015 6/1/2016 1/13/2017        11:42 AM  7:30 AM  2:40 AM  7:56 AM 11:38 AM  4:28 PM  1:14 PM     A1C 6.7 (H) 6.9 (H) 6.5 (H) 6.5 (H) 6.2 6.0 5.7     Comment for A1C at  1:14 PM on 1/13/2017:  According to ADA guidelines, hemoglobin A1C <7.0% represents  optimal control in non-pregnant diabetic patients.  Different  metrics may apply to specific populations.   Standards of Medical Care in Diabetes - 2016.  For the purpose of screening for the presence of diabetes:  <5.7%     Consistent with the absence of diabetes  5.7-6.4%  Consistent with increasing risk for diabetes   (prediabetes)  >or=6.5%  Consistent with diabetes  Currently no consensus exists for use of hemoglobin A1C  for diagnosis of diabetes for children.        Allergies as of 2/7/2017        Reactions    No Known Drug Allergies       Advance Directives     An advance directive is a document which, in the event you are no longer able to make decisions for yourself, tells your healthcare team what kind of treatment you do or do not want to receive, or who you would like to make those decisions for you.  If you do not currently have an advance directive, [facility] encourages you to create one.  For more information call:  [phone]), [phone] ([phone]),  or log on to [email or web address].        Smoking Cessation     If you would like to quit smoking:   You may be eligible for free services if " you are a [place] resident and started smoking cigarettes before September 1, 1988.  Call the Smoking Cessation Trust (SCT) toll free at (967) 914-7723 or (980) 408-0308.   Call 1-800-QUIT-NOW if you do not meet the above criteria.            Language Assistance Services     ATTENTION: Language assistance services are available, free of charge. Please call [phone].      ATENCIÓN: Si habla español, tiene a marcelo disposición servicios gratuitos de asistencia lingüística. Llame al [phone].     CHÚ Ý: N?u b?n nói Ti?ng Vi?t, có các d?ch v? h? tr? ngôn ng? mi?n phí dành cho b?n. G?i s? [phone].        Heart Failure Education       Heart Failure: Being Active  You have a condition called heart failure. Being active doesnt mean that you have to wear yourself out. Even a little movement each day helps to strengthen your heart. If you cant get out to exercise, you can do simple stretching and strengthening exercises at home. These are good ways to keep you well-conditioned and prevent you and your heart from becoming excessively weak.    Ideas to get you started  · Add a little movement to things you do now. Walk to mail letters. Park your car at the far end of the parking lot and walk to the store. Walk up a flight of stairs instead of taking the elevator.  · Choose activities you enjoy. You might walk, swim, or ride an exercise bike. Things like gardening and washing the car count, too. Other possibilities include: washing dishes, walking the dog, walking around the mall, and doing aerobic activities with friends.  · Join a group exercise program at a [facility], a senior center, or a community center. Or look into a hospital cardiac rehabilitation program. Ask your doctor if you qualify.  Tips to keep you going  · Get up and get dressed each day. Go to a coffee shop and read a newspaper or go somewhere that you'll be in the presence of other active people. Youll feel more like being  active.  · Make a plan. Choose one or more activities that you enjoy and that you can easily do. Then plan to do at least one each day. You might write your plan on a calendar.  · Go with a friend or a group if you like company. This can help you feel supported and stay motivated, too.  · Plan social events that you enjoy. This will keep you mentally engaged as well as physically motivated to do things you find pleasure in.  For your safety  · Talk with your healthcare provider before starting an exercise program.  · Exercise indoors when its too hot or too cold outside, or when the air quality is poor. Try walking at a shopping mall.  · Wear socks and sturdy shoes to maintain your balance and prevent falls.  · Start slowly. Do a few minutes several times a day at first. Increase your time and speed little by little.  · Stop and rest whenever you feel tired or get short of breath.  · Dont push yourself on days when you dont feel well.  Date Last Reviewed: 3/20/2016  © 5091-6554 [employer] ???. [address] [place] [address]. All rights reserved. This information is not intended as a substitute for professional medical care. Always follow your healthcare professional's instructions.              Heart Failure: Evaluating Your Heart  You have a condition called heart failure. To evaluate your condition, your doctor will examine you, ask questions, and do some tests. Along with looking for signs of heart failure, the doctor looks for any other health problems that may have led to heart failure. The results of your evaluation will help your doctor form a treatment plan.  Health history and physical exam  Your visit will start with a health history. Tell the doctor about any symptoms youve noticed and about all medicines you take. Then youll have a physical exam. This includes listening to your heartbeat and breathing. Youll also be checked for swelling (edema) in your legs and neck. When you  have fluid buildup or fluid in the lungs, it may be called congestive heart failure.  Diagnosing heart failure     During an echocardiogram, sound waves bounce off the heart. These are converted into a picture on the screen.   The following may be done to help your doctor form a diagnosis:  · X-rays show the size and shape of your heart. These pictures can also show fluid in your lungs.  · An electrocardiogram (ECG or EKG) shows the pattern of your heartbeat. Small pads (electrodes) are placed on your chest, arms, and legs. Wires connect the pads to the ECG machine, which records your hearts electrical signals. This can give the doctor information about heart function.  · An echocardiogram uses ultrasound waves to show the structure and movement of your heart muscle. This shows how well the heart pumps. It also shows the thickness of the heart walls, and if the heart is enlarged. It is one of the most useful, non-invasive tests as it provides information about the heart's general function. This helps your doctor make treatment decisions.  · Lab tests evaluate small amounts of blood or urine for signs of problems. A BNP lab test can help diagnose and evaluate heart failure. BNP stands for B-type natriuretic peptide. The ventricles secrete more BNP when heart failure worsens. Lab tests can also provide information about metabolic dysfunction or heart dysfunction.  Your treatment plan  Based on the results of your evaluation and tests, your doctor will develop a treatment plan. This plan is designed to relieve some of your heart failure symptoms and help make you more comfortable. Your treatment plan may include:  · Medicine to help your heart work better and improve your quality of life  · Changes in what you eat and drink to help prevent fluid from backing up in your body  · Daily monitoring of your weight and heart failure symptoms to see how well your treatment plan is working  · Exercise to help you stay  healthy  · Help with quitting smoking  · Emotional and psychological support to help adjust to the changes  · Referrals to other specialists to make sure you are being treated comprehensively  Date Last Reviewed: 3/21/2016  © 5837-0280 [employer]. [address] [place] [address], [place] [address]. All rights reserved. This information is not intended as a substitute for professional medical care. Always follow your healthcare professional's instructions.              Heart Failure: Making Changes to Your Diet  You have a condition called heart failure. When you have heart failure, excess fluid is more likely to build up in your body because your heart isn't working well. This makes the heart work harder to pump blood. Fluid buildup causes symptoms such as shortness of breath and swelling (edema). This is often referred to as congestive heart failure or CHF. Controlling the amount of salt (sodium) you eat may help stop fluid from building up. Your doctor may also tell you to reduce the amount of fluid you drink.  Reading food labels    Your healthcare provider will tell you how much sodium you can eat each day. Read food labels to keep track. Keep in mind that certain foods are high in salt. These include canned, frozen, and processed foods. Check the amount of sodium in each serving. Watch out for high-sodium ingredients. These include MSG (monosodium glutamate), baking soda, and sodium phosphate.   Eating less salt  Give yourself time to get used to eating less salt. It may take a little while. Here are some tips to help:  · Take the saltshaker off the table. Replace it with salt-free herb mixes and spices.  · Eat fresh or plain frozen vegetables. These have much less salt than canned vegetables.  · Choose low-sodium snacks like sodium-free pretzels, crackers, or air-popped popcorn.  · Dont add salt to your food when youre cooking. Instead, season your foods with pepper, lemon, garlic, or onion.  · When  you eat out, ask that your food be cooked without added salt.  · Avoid eating fried foods as these often have a great deal of salt.  If youre told to limit fluids  You may need to limit how much fluid you have to help prevent swelling. This includes anything that is liquid at room temperature, such as ice cream and soup. If your doctor tells you to limit fluid, try these tips:  · Measure drinks in a measuring cup before you drink them. This will help you meet daily goals.  · Chill drinks to make them more refreshing.  · Suck on frozen lemon wedges to quench thirst.  · Only drink when youre thirsty.  · Chew sugarless gum or suck on hard candy to keep your mouth moist.  · Weigh yourself daily to know if your body's fluid content is rising.  My sodium goal  Your healthcare provider may give you a sodium goal to meet each day. This includes sodium found in food as well as salt that you add. My goal is to eat no more than ___________ mg of sodium per day.     When to call your doctor  Call your doctor right away if you have any symptoms of worsening heart failure. These can include:  · Sudden weight gain  · Increased swelling of your legs or ankles  · Trouble breathing when youre resting or at night  · Increase in the number of pillows you have to sleep on  · Chest pain, pressure, discomfort, or pain in the jaw, neck, or back   Date Last Reviewed: 3/21/2016  © 4806-8593 [employer]. [address] [place] [address], [place] [address]. All rights reserved. This information is not intended as a substitute for professional medical care. Always follow your healthcare professional's instructions.              Heart Failure: Medicines to Help Your Heart    You have a condition called heart failure (also known as congestive heart failure, or CHF). Your doctor will likely prescribe medicines for heart failure and any underlying health problems you have. Most heart failure patients take one or more types of medicinen. Your  healthcare provider will work to find the combination of medicines that works best for you.  Heart failure medicines  Here are the most common heart failure medicines:  · ACE inhibitors lower blood pressure and decrease strain on the heart. This makes it easier for the heart to pump. Angiotensin receptor blockers have similar effects. These are prescribed for some patients instead of ACE inhibitors.  · Beta-blockers relieve stress on the heart. They also improve symptoms. They may also improve the heart's pumping action over time.  · Diuretics (also called water pills) help rid your body of excess water. This can help rid your body of swelling (edema). Having less fluid to pump means your heart doesnt have to work as hard. Some diuretics make your body lose a mineral called potassium. Your doctor will tell you if you need to take supplements or eat more foods high in potassium.  · Digoxin helps your heart pump with more strength. This helps your heart pump more blood with each beat. So, more oxygen-rich blood travels to the rest of the body.  · Aldosterone antagonists help alter hormones and decrease strain on the heart.  · Hydralazine and nitrates are two separate medicines used together to treat heart failure. They may come in one combination pill. They lower blood pressure and decrease how hard the heart has to pump.  Medicines for related conditions  Controlling other heart problems helps keep heart failure under control, too. Depending on other heart problems you have, medicines may be prescribed to:  · Lower blood pressure (antihypertensives).  · Lower cholesterol levels (statins).  · Prevent blood clots (anticoagulants or aspirin).  · Keep the heartbeat steady (antiarrhythmics).  Date Last Reviewed: 3/5/2016  © 7016-9874 [employer]. [address] [place] [address], [place] [address]. All rights reserved. This information is not intended as a substitute for professional medical care. Always follow  your healthcare professional's instructions.              Heart Failure: Procedures That May Help    The heart is a muscle that pumps oxygen-rich blood to all parts of the body. When you have heart failure, the heart is not able to pump as well as it should. Blood and fluid may back up into the lungs (congestive heart failure), and some parts of the body dont get enough oxygen-rich blood to work normally. These problems lead to the symptoms of heart failure.     Certain procedures may help the heart pump better in some cases of heart failure. Some procedures are done to treat health problems that may have caused the heart failure such as coronary artery disease or heart rhythm problems. For more serious heart failure, other options are available.  Treating artery and valve problems  If you have coronary artery disease or valve disease, procedures may be done to improve blood flow. This helps the heart pump better, which can improve heart failure symptoms. First, your doctor may do a cardiac catheterization to help detect clogged blood vessels or valve damage. During this procedure, a  thin tube (catheter) in inserted into a blood vessel and guided to the heart. There a dye is injected and a special type of X-ray (angiogram) is taken of the blood vessels. Procedures to open a blocked artery or fix damaged valves can also be done using catheterization.  · Angioplasty uses a balloon-tipped instrument at the end of the catheter. The balloon is inflated to widen the narrowed artery. In many cases, a stent is expanded to further support the narrowed artery. A stent is a metal mesh tube.  · Valve surgery repairs or replacement of faulty valves can also be done during catheterization so blood can flow properly through the chambers of the heart.  Bypass surgery is another option to help treat blocked arteries. It uses a healthy blood vessel from elsewhere in the body. The healthy blood vessel is attached above and below the  blocked area so that blood can flow around the blocked artery.  Treating heart rhythm problems  A device may be placed in the chest to help a weak heart maintain a healthy, heartbeat so the heart can pump more effectively:  · Pacemaker. A pacemaker is an implanted device that regulates your heartbeat electronically. It monitors your heart's rhythm and generates a painless electric impulse that helps the heart beat in a regular rhythm. A pacemaker is programmed to meet your specific heart rhythm needs.  · Biventricular pacing/cardiac resynchronization therapy. A type of pacemaker that paces both pumping chambers of the heart at the same time to coordinate contractions and to improve the heart's function. Some people with heart failure are candidates for this therapy.  · Implantable cardioverter defibrillator. A device similar to a pacemaker that senses when the heart is beating too fast and delivers an electrical shock to convert the fast rhythm to a normal rhythm. This can be a life saving device.  In severe cases  In more serious cases of heart failure when other treatments no longer work, other options may include:  · Ventricular assist devices (VADs). These are mechanical devices used to take over the pumping function for one or both of the heart's ventricles, or pumping chambers. A VAD may be necessary when heart failure progresses to the point that medicines and other treatments no longer help. In some cases, a VAD may be used as a bridge to transplant.  · Heart transplant. This is replacing the diseased heart with a healthy one from a donor. This is an option for a few people who are very sick. A heart transplant is very serious and not an option for all patients. Your doctor can tell you more.  Date Last Reviewed: 3/20/2016  © 0174-5517 [employer]. [address] [place] [address], [place] [address]. All rights reserved. This information is not intended as a substitute for professional medical care.  Always follow your healthcare professional's instructions.              Heart Failure: Tracking Your Weight  You have a condition called heart failure. When you have heart failure, a sudden weight gain or a steady rise in weight is a warning sign that your body is retaining too much water and salt. This could mean your heart failure is getting worse. If left untreated, it can cause problems for your lungs and result in shortness of breath. Weighing yourself each day is the best way to know if youre retaining water. If your weight goes up quickly, call your doctor. You will be given instructions on how to get rid of the excess water. You will likely need medicines and to avoid salt. This will help your heart work better.  Call your doctor if you gain more than 2 pounds in 1 day, more than 5 pounds in 1 week, or whatever weight gain you were told to report by your doctor. This is often a sign of worsening heart failure and needs to be evaluated and treated. Your doctor will tell you what to do next.   Tips for weighing yourself    · Weigh yourself at the same time each morning, wearing the same clothes. Weigh yourself after urinating and before eating.  · Use the same scale each day. Make sure the numbers are easy to read. Put the scale on a flat, hard surface -- not on a rug or carpet.  · Do not stop weighing yourself. If you forget one day, weigh again the next morning.  How to use your weight chart  · Keep your weight chart near the scale. Write your weight on the chart as soon as you get off the scale.  · Fill in the month and the start date on the chart. Then write down your weight each day. Your chart will look like this:    · If you miss a day, leave the space blank. Weigh yourself the next day and write your weight in the next space.  · Take your weight chart with you when you go to see your doctor.  Date Last Reviewed: 3/20/2016  © 7404-2445 [employer]. [address] [place] [address], [place]  [address]. All rights reserved. This information is not intended as a substitute for professional medical care. Always follow your healthcare professional's instructions.              Heart Failure: Warning Signs of a Flare-Up  You have a condition called heart failure. Once you have heart failure, flare-ups can happen. Below are signs that can mean your heart failure is getting worse. If you notice any of these warning signs, call your healthcare provider.  Swelling    · Your feet, ankles, or lower legs get puffier.  · You notice skin changes on your lower legs.  · Your shoes feel too tight.  · Your clothes are tighter in the waist.  · You have trouble getting rings on or off your fingers.  Shortness of breath  · You have to breathe harder even when youre doing your normal activities or when youre resting.  · You are short of breath walking up stairs or even short distances.  · You wake up at night short of breath or coughing.  · You need to use more pillows or sit up to sleep.  · You wake up tired or restless.  Other warning signs  · You feel weaker, dizzy, or more tired.  · You have chest pain or changes in your heartbeat.  · You have a cough that wont go away.  · You cant remember things or dont feel like eating.  Tracking your weight  Gaining weight is often the first warning sign that heart failure is getting worse. Gaining even a few pounds can be a sign that your body is retaining excess water and salt. Weighing yourself each day in the morning after you urinate and before you eat, is the best way to know if you're retaining water. Get a scale that is easy to read and make sure you wear the same clothes and use the same scale every time you weigh. Your healthcare provider will show you how to track your weight. Call your doctor if you gain more than 2 pounds in 1 day, 5 pounds in 1 week, or whatever weight gain you were told to report by your doctor. This is often a sign of worsening heart failure and needs  to be evaluated and treated before it compromises your breathing. Your doctor will tell you what to do next.    Date Last Reviewed: 3/15/2016  © 7310-5942 [employer]. [address] [place] [address], [place] [address]. All rights reserved. This information is not intended as a substitute for professional medical care. Always follow your healthcare professional's instructions.              Chronic Kindey Disease Education             Diabetes Discharge Instructions                                   [facility] Sign-Up     Activating your [facility] account is as easy as 1-2-3!     1) Visit [email or web address], select Sign Up Now, enter this activation code and your date of birth, then select Next.  [email or web address]  Expires: 2/27/2017 12:35 PM      2) Create a username and password to use when you visit [facility] in the future and select a security question in case you lose your password and select Next.    3) Enter your e-mail address and click Sign Up!    Additional Information  If you have questions, please e-mail [email or web address] or call [phone] to talk to our [facility] staff. Remember, [facility] is NOT to be used for urgent needs. For medical emergencies, dial 911.          [facility] complies with applicable Federal civil rights laws and does not discriminate on the basis of race, color, national origin, age, disability, or sex.

## 2017-02-05 PROBLEM — K72.00 ACUTE LIVER FAILURE WITHOUT HEPATIC COMA: Status: ACTIVE | Noted: 2017-02-04

## 2017-02-05 LAB
ALBUMIN SERPL BCP-MCNC: 2.7 G/DL
ALP SERPL-CCNC: 132 U/L
ALT SERPL W/O P-5'-P-CCNC: 327 U/L
ANION GAP SERPL CALC-SCNC: 8 MMOL/L
AST SERPL-CCNC: 577 U/L
BASOPHILS # BLD AUTO: 0.03 K/UL
BASOPHILS NFR BLD: 0.7 %
BILIRUB SERPL-MCNC: 1.7 MG/DL
BUN SERPL-MCNC: 31 MG/DL
CALCIUM SERPL-MCNC: 9.1 MG/DL
CHLORIDE SERPL-SCNC: 105 MMOL/L
CO2 SERPL-SCNC: 26 MMOL/L
COMPLEXED PSA SERPL-MCNC: 316.6 NG/ML
CREAT SERPL-MCNC: 2.1 MG/DL
DIFFERENTIAL METHOD: ABNORMAL
EOSINOPHIL # BLD AUTO: 0.1 K/UL
EOSINOPHIL NFR BLD: 2.3 %
ERYTHROCYTE [DISTWIDTH] IN BLOOD BY AUTOMATED COUNT: 14.6 %
EST. GFR  (AFRICAN AMERICAN): 30.7 ML/MIN/1.73 M^2
EST. GFR  (NON AFRICAN AMERICAN): 26.5 ML/MIN/1.73 M^2
GLUCOSE SERPL-MCNC: 108 MG/DL
HCT VFR BLD AUTO: 37.5 %
HGB BLD-MCNC: 12.2 G/DL
INR PPP: 1.8
LYMPHOCYTES # BLD AUTO: 1.3 K/UL
LYMPHOCYTES NFR BLD: 30 %
MCH RBC QN AUTO: 28.6 PG
MCHC RBC AUTO-ENTMCNC: 32.5 %
MCV RBC AUTO: 88 FL
MONOCYTES # BLD AUTO: 0.6 K/UL
MONOCYTES NFR BLD: 13.6 %
NEUTROPHILS # BLD AUTO: 2.3 K/UL
NEUTROPHILS NFR BLD: 53.4 %
PLATELET # BLD AUTO: 153 K/UL
PMV BLD AUTO: 10.3 FL
POTASSIUM SERPL-SCNC: 4.5 MMOL/L
PROT SERPL-MCNC: 6.2 G/DL
PROTHROMBIN TIME: 18.5 SEC
RBC # BLD AUTO: 4.27 M/UL
SODIUM SERPL-SCNC: 139 MMOL/L
WBC # BLD AUTO: 4.33 K/UL

## 2017-02-05 PROCEDURE — 25000003 PHARM REV CODE 250: Performed by: INTERNAL MEDICINE

## 2017-02-05 PROCEDURE — 80074 ACUTE HEPATITIS PANEL: CPT

## 2017-02-05 PROCEDURE — 25000003 PHARM REV CODE 250: Performed by: HOSPITALIST

## 2017-02-05 PROCEDURE — 85610 PROTHROMBIN TIME: CPT

## 2017-02-05 PROCEDURE — 36415 COLL VENOUS BLD VENIPUNCTURE: CPT

## 2017-02-05 PROCEDURE — 80053 COMPREHEN METABOLIC PANEL: CPT

## 2017-02-05 PROCEDURE — 84153 ASSAY OF PSA TOTAL: CPT

## 2017-02-05 PROCEDURE — 85025 COMPLETE CBC W/AUTO DIFF WBC: CPT

## 2017-02-05 PROCEDURE — 11000001 HC ACUTE MED/SURG PRIVATE ROOM

## 2017-02-05 PROCEDURE — 99232 SBSQ HOSP IP/OBS MODERATE 35: CPT | Mod: GC,,, | Performed by: HOSPITALIST

## 2017-02-05 RX ORDER — HEPARIN SODIUM 5000 [USP'U]/ML
5000 INJECTION, SOLUTION INTRAVENOUS; SUBCUTANEOUS EVERY 8 HOURS
Status: DISCONTINUED | OUTPATIENT
Start: 2017-02-05 | End: 2017-02-05

## 2017-02-05 RX ORDER — LACTULOSE 10 G/15ML
20 SOLUTION ORAL 2 TIMES DAILY
Status: DISCONTINUED | OUTPATIENT
Start: 2017-02-05 | End: 2017-02-07 | Stop reason: HOSPADM

## 2017-02-05 RX ORDER — LACTULOSE 10 G/15ML
20 SOLUTION ORAL DAILY
Status: DISCONTINUED | OUTPATIENT
Start: 2017-02-05 | End: 2017-02-05

## 2017-02-05 RX ORDER — HEPARIN SODIUM 5000 [USP'U]/ML
5000 INJECTION, SOLUTION INTRAVENOUS; SUBCUTANEOUS EVERY 12 HOURS
Status: DISCONTINUED | OUTPATIENT
Start: 2017-02-05 | End: 2017-02-07 | Stop reason: HOSPADM

## 2017-02-05 RX ORDER — FUROSEMIDE 40 MG/1
40 TABLET ORAL DAILY
Status: DISCONTINUED | OUTPATIENT
Start: 2017-02-06 | End: 2017-02-05

## 2017-02-05 RX ADMIN — HYDRALAZINE HYDROCHLORIDE 50 MG: 50 TABLET ORAL at 08:02

## 2017-02-05 RX ADMIN — CARVEDILOL 12.5 MG: 12.5 TABLET, FILM COATED ORAL at 05:02

## 2017-02-05 RX ADMIN — HEPARIN SODIUM 5000 UNITS: 5000 INJECTION, SOLUTION INTRAVENOUS; SUBCUTANEOUS at 09:02

## 2017-02-05 RX ADMIN — BICALUTAMIDE 50 MG: 50 TABLET, FILM COATED ORAL at 08:02

## 2017-02-05 RX ADMIN — CARVEDILOL 12.5 MG: 12.5 TABLET, FILM COATED ORAL at 08:02

## 2017-02-05 RX ADMIN — LACTULOSE 20 G: 10 SOLUTION ORAL at 09:02

## 2017-02-05 RX ADMIN — ASPIRIN 81 MG CHEWABLE TABLET 81 MG: 81 TABLET CHEWABLE at 08:02

## 2017-02-05 RX ADMIN — DOXAZOSIN MESYLATE 2 MG: 2 TABLET ORAL at 09:02

## 2017-02-05 RX ADMIN — SODIUM CHLORIDE 250 ML: 0.9 INJECTION, SOLUTION INTRAVENOUS at 03:02

## 2017-02-05 NOTE — H&P
[facility] Hospital Medicine  History & Physical    Patient Name: Orlando Tran Jr.  MRN: [id number]  Admission Date: 2/4/2017  Attending Physician: Yuridia Lee MD   Primary Care Provider: Jordon Spence MD    [facility] Medicine Team: Networked reference to record PCT  Yuri Miranda MD     Patient information was obtained from patient, relative(s), past medical records and ER records.     Subjective:     Principal Problem:<principal problem not specified>    Chief Complaint:   Chief Complaint   Patient presents with    Altered Mental Status     Patient with increased confusion, staring blankly off.  Patient with history of dementia, oriented self, place, not time, not event.        HPI: 89 yo male with PMH of chronic diastolic HF, CAD s/p CABG x 1, HTN, prostate adenocarcinoma, HLD, CKD-III, venous insufficiency who presents to [facility] for acute encephalopathy and bilateral leg weakness. Patient was is his normal state of health until this afternoon when patient's son saw patient sitting in the car staring at him blankly and not as responsive as per usual. The son attempted to help him exit the car, and the patient was unable to hold himself up due to lower extremity weakness. Patient's son denies loss of consciousness, urinary/bowel incontinence, or convulsion. When EMS arrived they noted expressive aphasia but no facial droop or drift. Patient's son notes that patient has returned to his baseline in the ED. Son also mentions that patient has been a little more short of breath with exertion, but denies orthopnea, increased LE edema. Patient has had a good appetite lately but does not avoid salty foods.    Past Medical History   Diagnosis Date    Cancer     CKD (chronic kidney disease), stage III     Coronary artery disease      s/p stents to LCF & RCA - patent 2011    Edema of both legs     Gout, chronic     Hyperlipidemia      not currently on statin    Hypertension     Prostate  cancer     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     3 v incl LIMA to LAD       Past Surgical History   Procedure Laterality Date    Coronary artery bypass graft  12-13-01    Coronary stent placement         Review of patient's allergies indicates:   Allergen Reactions    No known drug allergies        Current Facility-Administered Medications on File Prior to Encounter   Medication    triptorelin pamoate Syrg 22.5 mg     Current Outpatient Prescriptions on File Prior to Encounter   Medication Sig    allopurinol (ZYLOPRIM) 300 MG tablet Take 1 tablet (300 mg total) by mouth Daily.    aspirin 81 mg Tab Take 1 tablet by mouth.    bicalutamide (CASODEX) 50 MG Tab Take 1 tablet (50 mg total) by mouth once daily.    carvedilol (COREG) 12.5 MG tablet Take 1 tablet (12.5 mg total) by mouth 2 (two) times daily with meals.    donepezil (ARICEPT) 5 MG tablet Take 1 tablet (5 mg total) by mouth once daily.    doxazosin (CARDURA) 2 MG tablet Take 1 tablet (2 mg total) by mouth every evening.    furosemide (LASIX) 40 MG tablet Take 1 tablet (40 mg total) by mouth 2 (two) times daily.    hydrALAZINE (APRESOLINE) 50 MG tablet Take 1 tablet (50 mg total) by mouth every 12 (twelve) hours.    simvastatin (ZOCOR) 40 MG tablet Take 1 tablet (40 mg total) by mouth every evening.     Family History     None        Social History Main Topics    Smoking status: Former Smoker     Quit date: 11/14/1989    Smokeless tobacco: Not on file    Alcohol use Yes      Comment: 1/5th of vodka every 10 days    Drug use: Not on file    Sexual activity: Not on file     Review of Systems   Constitutional: Negative for chills, fatigue and fever.   HENT: Negative for drooling and trouble swallowing.    Respiratory: Positive for shortness of breath. Negative for cough.    Cardiovascular: Positive for leg swelling. Negative for chest pain and palpitations.   Gastrointestinal: Negative for abdominal pain, constipation, diarrhea, nausea and vomiting.    Genitourinary: Negative for dysuria and hematuria.   Musculoskeletal: Positive for arthralgias. Negative for back pain, joint swelling and myalgias.   Skin: Negative for color change.   Neurological: Positive for light-headedness. Negative for dizziness, syncope, facial asymmetry, weakness, numbness and headaches.     Objective:     Vital Signs (Most Recent):  Temp: 98.1 °F (36.7 °C) (02/04/17 1440)  Pulse: 78 (02/04/17 1800)  Resp: (!) 25 (02/04/17 1800)  BP: (!) 161/74 (02/04/17 1800)  SpO2: 97 % (02/04/17 1800) Vital Signs (24h Range):  Temp:  [98.1 °F (36.7 °C)] 98.1 °F (36.7 °C)  Pulse:  [72-78] 78  Resp:  [5-28] 25  SpO2:  [97 %-98 %] 97 %  BP: (151-173)/(74-96) 161/74        There is no height or weight on file to calculate BMI.    Physical Exam   Constitutional: He is oriented to person, place, and time. He appears well-developed and well-nourished.   HENT:   Head: Normocephalic and atraumatic.   Right Ear: External ear normal.   Left Ear: External ear normal.   Eyes: EOM are normal.   Neck: Normal range of motion. Neck supple.   Cardiovascular: Normal rate, regular rhythm, normal heart sounds and intact distal pulses.    Pulmonary/Chest: Effort normal. He has no wheezes. He exhibits no tenderness.   Mild crackles at the base of lungs (R>L)   Abdominal: Soft. Bowel sounds are normal.   Musculoskeletal:   1+ bilateral LE pitting edema   Neurological: He is alert and oriented to person, place, and time. No cranial nerve deficit.   4/5 strength UE and LE  No drift, no facial droop     Skin: Skin is warm and dry.   Psychiatric: He has a normal mood and affect. His behavior is normal.        Significant Labs: All pertinent labs within the past 24 hours have been reviewed.    Significant Imaging: I have reviewed all pertinent imaging results/findings within the past 24 hours.    Assessment/Plan:     Acute encephalopathy  - Likely TIA given complete resolution of symptoms  - CT neg for acute stroke or hemorrhage  (chronic microvascular changes)  - UA neg for infection  - CXR neg for consolidation or patchy opacities  - Will give 1 dose of aspirin 325 mg   - continue aspirin 81 mg, simvastatin       Acute on chronic diastolic heart failure  - CXR shows mild right pleural effusion, no bilateral opacities  - BNP elevated to 2656 (baseline 1158)  - Received IV lasix 80 mg in ED  - Strict ins/out  - Will resume home lasix 40 mg BID tomorrow      Transaminitis  - Could be due to hepatic congestion given history of heart failure  - Ordered acute hep panel, EtOH, acetaminophen   - Will order liver doppler to rule out Budd Chiari and other liver abnormalities      Hypertension associated with diabetes  - continue coreg, lasix, hydralazine       Prostate cancer  - history of prostate adenocarcinoma  - PSA 6/2016 200s. Will add PSA to morning labs  - continue bicalutamide and doxazosin        CKD (chronic kidney disease), stage III  - Cr elevated to 1.9 on admission (baseline       Combined hyperlipidemia associated with type 2 diabetes mellitus  - continue simvastatin   - Hgb 5.4 in 1/2017      Dementia without behavioral disturbance  Hold aricept in light of acute encephalopathy       Chronic gout without tophus  Continue allopurinol       Coronary artery disease involving native coronary artery without angina pectoris  - s/p CABGx1, stents to LCF&RCF (2011)  - continue aspirin, simvastatin       VTE Risk Mitigation         Ordered     Low Risk of VTE  Once      02/04/17 1333        Yuri Miranda MD  Department of Hospital Medicine   [facility]

## 2017-02-05 NOTE — ASSESSMENT & PLAN NOTE
- Could be due to hepatic congestion given history of heart failure  - Ordered acute hep panel, EtOH, acetaminophen   - Will order liver doppler to rule out Budd Chiari and other liver abnormalities

## 2017-02-05 NOTE — PROGRESS NOTES
Pt awaiting transfer to [facility]. Iv patent and intact. Vitals stable. Pt and family verbalized understanding of transfer plan, rx, and care plan. Report called to JOHN Richards. See epic chart for vs, assessments, and orders

## 2017-02-05 NOTE — ASSESSMENT & PLAN NOTE
- CXR shows mild right pleural effusion, no bilateral opacities  - BNP elevated to 2656 (baseline 1158)  - Received IV lasix 80 mg in ED  - Strict ins/out  - Will resume home lasix 40 mg BID tomorrow

## 2017-02-05 NOTE — PLAN OF CARE
Problem: Patient Care Overview  Goal: Plan of Care Review  Outcome: Ongoing (interventions implemented as appropriate)  Pt free of falls/injuries throughout the shift. Bed locked, in lowest position, call bell within reach. Pt afebrile, pain denied, bed alarm on VSS, will continue to monitor.

## 2017-02-05 NOTE — ASSESSMENT & PLAN NOTE
- Likely TIA given complete resolution of symptoms  - CT neg for acute stroke or hemorrhage (chronic microvascular changes)  - UA neg for infection  - CXR neg for consolidation or patchy opacities  - Will give 1 dose of aspirin 325 mg   - continue aspirin 81 mg, simvastatin

## 2017-02-05 NOTE — ASSESSMENT & PLAN NOTE
- history of prostate adenocarcinoma  - PSA 6/2016 200s. Will add PSA to morning labs  - continue bicalutamide and doxazosin

## 2017-02-05 NOTE — SUBJECTIVE & OBJECTIVE
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     3 v incl LIMA to LAD       Past Surgical History   Procedure Laterality Date    Coronary artery bypass graft  12-13-01    Coronary stent placement         Review of patient's allergies indicates:   Allergen Reactions    No known drug allergies        Current Facility-Administered Medications on File Prior to Encounter   Medication    triptorelin pamoate Syrg 22.5 mg     Current Outpatient Prescriptions on File Prior to Encounter   Medication Sig    allopurinol (ZYLOPRIM) 300 MG tablet Take 1 tablet (300 mg total) by mouth Daily.    aspirin 81 mg Tab Take 1 tablet by mouth.    bicalutamide (CASODEX) 50 MG Tab Take 1 tablet (50 mg total) by mouth once daily.    carvedilol (COREG) 12.5 MG tablet Take 1 tablet (12.5 mg total) by mouth 2 (two) times daily with meals.    donepezil (ARICEPT) 5 MG tablet Take 1 tablet (5 mg total) by mouth once daily.    doxazosin (CARDURA) 2 MG tablet Take 1 tablet (2 mg total) by mouth every evening.    furosemide (LASIX) 40 MG tablet Take 1 tablet (40 mg total) by mouth 2 (two) times daily.    hydrALAZINE (APRESOLINE) 50 MG tablet Take 1 tablet (50 mg total) by mouth every 12 (twelve) hours.    simvastatin (ZOCOR) 40 MG tablet Take 1 tablet (40 mg total) by mouth every evening.     Family History     None        Social History Main Topics    Smoking status: Former Smoker     Quit date: 11/14/1989    Smokeless tobacco: Not on file    Alcohol use Yes      Comment: 1/5th of vodka every 10 days    Drug use: Not on file    Sexual activity: Not on file     Review of Systems   Constitutional: Negative for chills, fatigue and fever.   HENT: Negative for  drooling and trouble swallowing.    Respiratory: Positive for shortness of breath. Negative for cough.    Cardiovascular: Positive for leg swelling. Negative for chest pain and palpitations.   Gastrointestinal: Negative for abdominal pain, constipation, diarrhea, nausea and vomiting.   Genitourinary: Negative for dysuria and hematuria.   Musculoskeletal: Positive for arthralgias. Negative for back pain, joint swelling and myalgias.   Skin: Negative for color change.   Neurological: Positive for light-headedness. Negative for dizziness, syncope, facial asymmetry, weakness, numbness and headaches.     Objective:     Vital Signs (Most Recent):  Temp: 98.1 °F (36.7 °C) (02/04/17 1440)  Pulse: 78 (02/04/17 1800)  Resp: (!) 25 (02/04/17 1800)  BP: (!) 161/74 (02/04/17 1800)  SpO2: 97 % (02/04/17 1800) Vital Signs (24h Range):  Temp:  [98.1 °F (36.7 °C)] 98.1 °F (36.7 °C)  Pulse:  [72-78] 78  Resp:  [5-28] 25  SpO2:  [97 %-98 %] 97 %  BP: (151-173)/(74-96) 161/74        There is no height or weight on file to calculate BMI.    Physical Exam   Constitutional: He is oriented to person, place, and time. He appears well-developed and well-nourished.   HENT:   Head: Normocephalic and atraumatic.   Right Ear: External ear normal.   Left Ear: External ear normal.   Eyes: EOM are normal.   Neck: Normal range of motion. Neck supple.   Cardiovascular: Normal rate, regular rhythm, normal heart sounds and intact distal pulses.    Pulmonary/Chest: Effort normal. He has no wheezes. He exhibits no tenderness.   Mild crackles at the base of lungs (R>L)   Abdominal: Soft. Bowel sounds are normal.   Musculoskeletal:   1+ bilateral LE pitting edema   Neurological: He is alert and oriented to person, place, and time. No cranial nerve deficit.   4/5 strength UE and LE  No drift, no facial droop     Skin: Skin is warm and dry.   Psychiatric: He has a normal mood and affect. His behavior is normal.        Significant Labs: All pertinent labs  within the past 24 hours have been reviewed.    Significant Imaging: I have reviewed all pertinent imaging results/findings within the past 24 hours.

## 2017-02-06 LAB
ALBUMIN SERPL BCP-MCNC: 2.8 G/DL
ALP SERPL-CCNC: 132 U/L
ALT SERPL W/O P-5'-P-CCNC: 264 U/L
AMMONIA PLAS-SCNC: 31 UMOL/L
ANION GAP SERPL CALC-SCNC: 10 MMOL/L
AORTIC VALVE REGURGITATION: ABNORMAL
AST SERPL-CCNC: 246 U/L
BASOPHILS # BLD AUTO: 0.03 K/UL
BASOPHILS NFR BLD: 0.8 %
BILIRUB SERPL-MCNC: 1.8 MG/DL
BUN SERPL-MCNC: 31 MG/DL
CALCIUM SERPL-MCNC: 9.2 MG/DL
CHLORIDE SERPL-SCNC: 105 MMOL/L
CO2 SERPL-SCNC: 24 MMOL/L
CREAT SERPL-MCNC: 1.8 MG/DL
DIASTOLIC DYSFUNCTION: YES
DIFFERENTIAL METHOD: ABNORMAL
EOSINOPHIL # BLD AUTO: 0.1 K/UL
EOSINOPHIL NFR BLD: 2.6 %
ERYTHROCYTE [DISTWIDTH] IN BLOOD BY AUTOMATED COUNT: 14.8 %
EST. GFR  (AFRICAN AMERICAN): 36.9 ML/MIN/1.73 M^2
EST. GFR  (NON AFRICAN AMERICAN): 32 ML/MIN/1.73 M^2
ESTIMATED PA SYSTOLIC PRESSURE: 55
GLUCOSE SERPL-MCNC: 94 MG/DL
HAV IGM SERPL QL IA: NEGATIVE
HBV CORE IGM SERPL QL IA: NEGATIVE
HBV SURFACE AG SERPL QL IA: NEGATIVE
HCT VFR BLD AUTO: 38.7 %
HCV AB SERPL QL IA: NEGATIVE
HGB BLD-MCNC: 12.5 G/DL
INR PPP: 1.4
LDH SERPL L TO P-CCNC: 306 U/L
LYMPHOCYTES # BLD AUTO: 1 K/UL
LYMPHOCYTES NFR BLD: 24.8 %
MCH RBC QN AUTO: 28.5 PG
MCHC RBC AUTO-ENTMCNC: 32.3 %
MCV RBC AUTO: 88 FL
MITRAL VALVE REGURGITATION: ABNORMAL
MONOCYTES # BLD AUTO: 0.4 K/UL
MONOCYTES NFR BLD: 11 %
NEUTROPHILS # BLD AUTO: 2.4 K/UL
NEUTROPHILS NFR BLD: 60.5 %
PLATELET # BLD AUTO: 145 K/UL
PMV BLD AUTO: 10.1 FL
POTASSIUM SERPL-SCNC: 3.9 MMOL/L
PROT SERPL-MCNC: 6.5 G/DL
PROTHROMBIN TIME: 14.7 SEC
RBC # BLD AUTO: 4.38 M/UL
RETIRED EF AND QEF - SEE NOTES: 40 (ref 55–65)
SODIUM SERPL-SCNC: 139 MMOL/L
TRICUSPID VALVE REGURGITATION: ABNORMAL
WBC # BLD AUTO: 3.91 K/UL

## 2017-02-06 PROCEDURE — 93306 TTE W/DOPPLER COMPLETE: CPT

## 2017-02-06 PROCEDURE — 97161 PT EVAL LOW COMPLEX 20 MIN: CPT

## 2017-02-06 PROCEDURE — 25000003 PHARM REV CODE 250: Performed by: INTERNAL MEDICINE

## 2017-02-06 PROCEDURE — 85025 COMPLETE CBC W/AUTO DIFF WBC: CPT

## 2017-02-06 PROCEDURE — 25000003 PHARM REV CODE 250: Performed by: HOSPITALIST

## 2017-02-06 PROCEDURE — 11000001 HC ACUTE MED/SURG PRIVATE ROOM

## 2017-02-06 PROCEDURE — 99223 1ST HOSP IP/OBS HIGH 75: CPT | Mod: GC,,, | Performed by: INTERNAL MEDICINE

## 2017-02-06 PROCEDURE — 83615 LACTATE (LD) (LDH) ENZYME: CPT

## 2017-02-06 PROCEDURE — 97165 OT EVAL LOW COMPLEX 30 MIN: CPT

## 2017-02-06 PROCEDURE — 36415 COLL VENOUS BLD VENIPUNCTURE: CPT

## 2017-02-06 PROCEDURE — 93306 TTE W/DOPPLER COMPLETE: CPT | Mod: 26,,, | Performed by: INTERNAL MEDICINE

## 2017-02-06 PROCEDURE — 80053 COMPREHEN METABOLIC PANEL: CPT

## 2017-02-06 PROCEDURE — 82140 ASSAY OF AMMONIA: CPT

## 2017-02-06 PROCEDURE — 85610 PROTHROMBIN TIME: CPT

## 2017-02-06 RX ORDER — FUROSEMIDE 40 MG/1
40 TABLET ORAL DAILY
Status: DISCONTINUED | OUTPATIENT
Start: 2017-02-06 | End: 2017-02-07 | Stop reason: HOSPADM

## 2017-02-06 RX ORDER — PHYTONADIONE 5 MG/1
10 TABLET ORAL DAILY
Status: DISCONTINUED | OUTPATIENT
Start: 2017-02-06 | End: 2017-02-07 | Stop reason: HOSPADM

## 2017-02-06 RX ORDER — DONEPEZIL HYDROCHLORIDE 5 MG/1
5 TABLET, FILM COATED ORAL NIGHTLY
Status: DISCONTINUED | OUTPATIENT
Start: 2017-02-06 | End: 2017-02-07 | Stop reason: HOSPADM

## 2017-02-06 RX ADMIN — CARVEDILOL 12.5 MG: 12.5 TABLET, FILM COATED ORAL at 04:02

## 2017-02-06 RX ADMIN — LACTULOSE 20 G: 10 SOLUTION ORAL at 08:02

## 2017-02-06 RX ADMIN — FUROSEMIDE 40 MG: 40 TABLET ORAL at 04:02

## 2017-02-06 RX ADMIN — DONEPEZIL HYDROCHLORIDE 5 MG: 5 TABLET, FILM COATED ORAL at 08:02

## 2017-02-06 RX ADMIN — DOXAZOSIN MESYLATE 2 MG: 2 TABLET ORAL at 08:02

## 2017-02-06 RX ADMIN — HEPARIN SODIUM 5000 UNITS: 5000 INJECTION, SOLUTION INTRAVENOUS; SUBCUTANEOUS at 08:02

## 2017-02-06 RX ADMIN — ASPIRIN 81 MG CHEWABLE TABLET 81 MG: 81 TABLET CHEWABLE at 08:02

## 2017-02-06 RX ADMIN — CARVEDILOL 12.5 MG: 12.5 TABLET, FILM COATED ORAL at 08:02

## 2017-02-06 RX ADMIN — PHYTONADIONE 10 MG: 5 TABLET ORAL at 04:02

## 2017-02-06 NOTE — ASSESSMENT & PLAN NOTE
- history of prostate adenocarcinoma  - PSA 6/2016 200s. Repeat PSA in 300s  - continue doxazosin  - holding bicalutamide as it may be contributing to to liver injury

## 2017-02-06 NOTE — ASSESSMENT & PLAN NOTE
- s/p CABGx1, stents to LCF&RCF (2011)  - continue aspirin. Hold simvastatin given liver injury

## 2017-02-06 NOTE — PLAN OF CARE
Problem: Physical Therapy Goal  Goal: Physical Therapy Goal  Goals to be met by: 2017     Patient will increase functional independence with mobility by performin. Supine to sit with Modified [place]  2. Sit to supine with Modified [place]  3. Sit to stand transfer with Modified [place]  4. Gait x 250 feet with Stand-by Assistance using appropriate AD.  5. Ascend/descend 2 stair with no Handrails Contact Guard Assistance.  Outcome: Ongoing (interventions implemented as appropriate)  PT evaluation complete. Pt to benefit from skilled acute physical therapy. POC in place.     RENATA Flores.  2017

## 2017-02-06 NOTE — PLAN OF CARE
Problem: Patient Care Overview  Goal: Plan of Care Review  Outcome: Ongoing (interventions implemented as appropriate)  Pt free of falls/injuries throughout the shift. Bed locked, in lowest position, call bell within reach. Pt afebrile, resting in chair, pain assessed & denied. VSS, pt in on no distress, will continue to monitor.

## 2017-02-06 NOTE — ASSESSMENT & PLAN NOTE
- Cr elevated to 2.1 (baseline 1.5)  - May be due to lasix and poor po intake prior to admission   - Given 250 cc NS bolus   - urine electrolytes ordered

## 2017-02-06 NOTE — PT/OT/SLP EVAL
Physical Therapy  Evaluation    Orlando Tran Jr.   MRN: [id number]   Admitting Diagnosis: Acute liver failure without hepatic coma    PT Received On: 02/06/17  PT Start Time: 0112     PT Stop Time: 0128    PT Total Time (min): 16 min       Billable Minutes:  Evaluation 16    Diagnosis: Acute liver failure without hepatic coma    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     3 v incl LIMA to LAD      Past Surgical History   Procedure Laterality Date    Coronary artery bypass graft  12-13-01    Coronary stent placement         Referring physician: DELLA Lee  Date referred to PT: 2/6/2017    General Precautions: Standard, fall  Orthopedic Precautions: N/A   Braces: N/A            Patient History:  Lives With: alone  Living Arrangements: house  Home Accessibility: stairs to enter home  Number of Stairs to Enter Home: 2  Stair Railings at Home: none  Transportation Available: other (see comments)  Living Environment Comment: Pts son, Sudhir, present for second half of al and able to provide more accurate history, as pt is a poor historian. Pt lives alone in a 1SH with 2STE. Pt has a walkin shower with grab bars. Pts son was unsure if assistance was needed to ADLs, and pt reports (I). Pt ambulates with SC, and son reports pt ambulates very little and sits most of the day. Pts sonFerny, lives close by but works 12hour shifts.  Equipment Currently Used at Home: cane, straight, walker, standard, shower chair  DME owned (not currently used): none    Previous Level of Function:  Ambulation Skills: needs device  Transfer Skills: needs device  ADL Skills: independent    Subjective:  Communicated with RN prior to session.  Pt agreeable to PT/OT  evaluation.  Chief Complaint: none reported  Patient goals: return to home    Pain Rating:  (none reported)     Objective:   Pt found seated in chair. Pts son, Sudhir, arrived skilled nursing through evaluation.     Cognitive Exam:  Oriented to: Person and Place    Follows Commands/attention: Easily distracted and Follows one-step commands  Communication: clear/fluent  Safety awareness/insight to disability: impaired    Physical Exam:  Postural examination/scapula alignment: Rounded shoulder and Head forward    Skin integrity: Visible skin intact  Edema: None noted BLE    Sensation:   Intact  light/touch BLE    Lower Extremity Range of Motion:  Right Lower Extremity: WFL  Left Lower Extremity: WFL    Lower Extremity Strength:  Right Lower Extremity: WFL  Left Lower Extremity: WFL    Gross motor coordination: WFL    Functional Mobility:  Bed Mobility:  Not assessed 2* found seated in chair    Transfers:  Sit <> Stand Assistance: Supervision  Sit <> Stand Assistive Device: Straight Cane    Gait:   Gait Distance: ambulated ~200feet with SC and with CGA and Rocael for occasional unsteadiness. ambulated ~20ft with SC and with CGA, no LOB. pt ambulates looking down at ground, with fast pace. VC to slow down and look up, with minimal effect.  Assistance 1: Contact Guard Assistance, Minimum assistance  Gait Assistive Device: Single point cane, Rolling walker  Gait Pattern: reciprocal  Gait Deviation(s): forward lean    Stairs:  Not assessed at this time.    Balance:   Static Sit: GOOD: Takes MODERATE challenges from all directions  Dynamic Sit: GOOD: Maintains balance through MODERATE excursions of active trunk movement  Static Stand: FAIR+: Takes MINIMAL challenges from all directions  Dynamic stand: POOR: N/A    Therapeutic Activities and Exercises:  Pt educated on role of PT/POC.  Pt educated on safety with mobility and importance of OOB activities.  Pt able to stand with supervision to don jacket.  Pt safe to ambulate in hallway  with RW with RN staff.    AM-PAC 6 CLICK MOBILITY  How much help from another person does this patient currently need?   1 = Unable, Total/Dependent Assistance  2 = A lot, Maximum/Moderate Assistance  3 = A little, Minimum/Contact Guard/Supervision  4 = None, Modified [place]/Independent    Turning over in bed (including adjusting bedclothes, sheets and blankets)?: 4  Sitting down on and standing up from a chair with arms (e.g., wheelchair, bedside commode, etc.): 4  Moving from lying on back to sitting on the side of the bed?: 4  Moving to and from a bed to a chair (including a wheelchair)?: 4  Need to walk in hospital room?: 3  Climbing 3-5 steps with a railing?: 3  Total Score: 22     AM-PAC Raw Score CMS G-Code Modifier Level of Impairment Assistance   6 % Total / Unable   7 - 9 CM 80 - 100% Maximal Assist   10 - 14 CL 60 - 80% Moderate Assist   15 - 19 CK 40 - 60% Moderate Assist   20 - 22 CJ 20 - 40% Minimal Assist   23 CI 1-20% SBA / CGA   24 CH 0% Independent/ Mod I     Patient left up in chair with all lines intact, call button in reach, RN notified and son present.    Assessment:   Orlando Tran Jr. is a 89 y.o. male with a medical diagnosis of Acute liver failure without hepatic coma and presents with decreased balance, poor conditioning, and minimal safety awareness. Pt poor historian and unaware of physical limitations. Pt with poor endurance during ambulation, resulting in unsteadiness and inability to walk in a straight line. Pt required Rocael for longer distance ambulation for steadiness 2* easily distracted, poor endurance, poor AD management, and diminished safety awareness. Pt with improved ambulation with RW vs. SC. Recommend HHPT with 24/7 supervision upon d/c. Pt to benefit from skilled acute physical therapy to address these deficits.    Rehab identified problem list/impairments: Rehab identified problem list/impairments: impaired endurance, impaired balance, decreased safety  awareness, decreased lower extremity function, gait instability, impaired functional mobilty, decreased coordination, impaired cognition    Rehab potential is good.    Activity tolerance: Good    Discharge recommendations: Discharge Facility/Level Of Care Needs: home health PT (with  assist)     Barriers to discharge: Barriers to Discharge: Inaccessible home environment, Decreased caregiver support    Equipment recommendations: Equipment Needed After Discharge: walker, rolling     GOALS:   Physical Therapy Goals        Problem: Physical Therapy Goal    Goal Priority Disciplines Outcome Goal Variances Interventions   Physical Therapy Goal     PT/OT, PT Ongoing (interventions implemented as appropriate)     Description:  Goals to be met by: 2017     Patient will increase functional independence with mobility by performin. Supine to sit with Modified [place]  2. Sit to supine with Modified [place]  3. Sit to stand transfer with Modified [place]  4. Gait  x 250 feet with Stand-by Assistance using appropriate AD.  5. Ascend/descend 2 stair with no Handrails Contact Guard Assistance.              PLAN:    Patient to be seen 3 x/week to address the above listed problems via gait training, therapeutic activities, therapeutic exercises, therapeutic groups, neuromuscular re-education, wheelchair management/training  Plan of Care expires: 17  Plan of Care reviewed with: patient, preet Gonsalez Yogesh, SPT  2017

## 2017-02-06 NOTE — PROGRESS NOTES
[facility] Hospital Medicine  Progress Note    Patient Name: Orlando Tran Jr.  MRN: [id number]  Patient Class: IP- Inpatient   Admission Date: 2/4/2017  Length of Stay: 2 days  Attending Physician: Yuridia Lee MD  Primary Care Provider: Jordon Spence MD    [facility] Medicine Team: Networked reference to record PCT  Yuri Miranda MD    Subjective:     Principal Problem:Acute liver failure without hepatic coma    HPI:  89 yo male with PMH of chronic diastolic HF, CAD s/p CABG x 1, HTN, prostate adenocarcinoma, HLD, CKD-III, venous insufficiency who presents to [facility] for acute encephalopathy and bilateral leg weakness. Patient was is his normal state of health until this afternoon when patient's son saw patient sitting in the car staring at him blankly and not as responsive as per usual. The son attempted to help him exit the car, and the patient was unable to hold himself up due to lower extremity weakness. Patient's son denies loss of consciousness, urinary/bowel incontinence, or convulsion. When EMS arrived they noted expressive aphasia but facial droop or drift. Patient's son notes that patient has returned to his baseline in the ED. Son also mentions that patient has been a little more short of breath with exertion, but denies orthopnea, increased LE edema. Patient has had a good appetite lately but does not avoid salty foods.    Hospital Course:  CT head in ED was negative for acute hemorrhages or infarctions and no masses. CXR showed mild cardiomegaly.small right effusion. He was given IV lasix 80 mg in ED. Liver enzymes were elevated on admission so liver injury work-up was initiated. All meds that could cause liver injury were discontinued.        Interval History: NAEON. Patient sitting comfortably in bed. A little more confused this morning, oriented to person but not place or time. No focal neurologic deficits    Review of Systems   Constitutional: Negative for chills, fatigue and  fever.   HENT: Negative for drooling and trouble swallowing.    Respiratory: Positive for shortness of breath. Negative for cough.    Cardiovascular: Positive for leg swelling. Negative for chest pain and palpitations.   Gastrointestinal: Negative for abdominal pain, constipation, diarrhea, nausea and vomiting.   Genitourinary: Negative for dysuria and hematuria.   Musculoskeletal: Positive for arthralgias. Negative for back pain, joint swelling and myalgias.   Skin: Negative for color change.   Neurological: Negative for dizziness, syncope, facial asymmetry, weakness, light-headedness, numbness and headaches.     Objective:     Vital Signs (Most Recent):  Temp: 97.3 °F (36.3 °C) (02/06/17 0411)  Pulse: (!) 59 (02/06/17 0411)  Resp: 16 (02/06/17 0411)  BP: (!) 164/58 (02/06/17 0411)  SpO2: 100 % (02/06/17 0411) Vital Signs (24h Range):  Temp:  [97.3 °F (36.3 °C)-98.3 °F (36.8 °C)] 97.3 °F (36.3 °C)  Pulse:  [53-65] 59  Resp:  [16-18] 16  SpO2:  [95 %-100 %] 100 %  BP: (121-164)/(56-78) 164/58     Weight: 78 kg (172 lb)  Body mass index is 27.76 kg/(m^2).  No intake or output data in the 24 hours ending 02/06/17 0533   Physical Exam   Constitutional: He is oriented to person, place, and time. He appears well-developed and well-nourished.   HENT:   Head: Normocephalic and atraumatic.   Right Ear: External ear normal.   Left Ear: External ear normal.   Eyes: EOM are normal.   Neck: Normal range of motion. Neck supple.   Cardiovascular: Normal rate, regular rhythm, normal heart sounds and intact distal pulses.    Pulmonary/Chest: Effort normal. He has no wheezes. He exhibits no tenderness.   Mild crackles at the base of lungs (R>L)   Abdominal: Soft. Bowel sounds are normal.   Musculoskeletal:   1+ bilateral LE pitting edema   Neurological: He is alert and oriented to person, place, and time. No cranial nerve deficit.   4/5 strength UE and LE  No drift, no facial droop     Skin: Skin is warm and dry.   Psychiatric: He  has a normal mood and affect. His behavior is normal.       Significant Labs: All pertinent labs within the past 24 hours have been reviewed.    Significant Imaging: I have reviewed all pertinent imaging results/findings within the past 24 hours.    Assessment/Plan:      Acute encephalopathy  - Likely TIA given complete resolution of symptoms vs hepatic encephalopathy   - CT neg for acute stroke or hemorrhage (chronic microvascular changes)  - UA neg for infection  - CXR neg for consolidation or patchy opacities  - Given 1 dose of aspirin 325 mg   - continue aspirin 81 mg  - Started lactulose to cover for hepatic encephalopathy       Acute on chronic diastolic heart failure  - CXR shows mild right pleural effusion, no bilateral opacities  - BNP elevated to 2656 (baseline 1158)  - Received IV lasix 80 mg in ED  - Strict ins/out  - Holding home lasix given JEREMIAH      * Acute liver failure without hepatic coma  - Could be due to hepatic congestion given history of heart failure  - More likely cause is polypharmacy as patient is taking medications that could contribute to liver damage. Will hold these medications (allupurinol, statin, bicalutamide)  - Ordered acute hep panel  - Acetomenopin, EtOH levels wnl  - Liver US shows no evidence of parenchymal abnormality or thrombosis of portal/hepatic veins   - consider Hepatology consult      Hypertension associated with diabetes  - continue coreg  - hold hydralazine and lasix (given new JEREMIAH)      Prostate cancer  - history of prostate adenocarcinoma  - PSA 6/2016 200s. Repeat PSA in 300s  - continue doxazosin  - holding bicalutamide as it may be contributing to to liver injury        CKD (chronic kidney disease), stage III  - Cr elevated to 2.1 (baseline 1.5)  - May be due to lasix and poor po intake prior to admission   - Given 250 cc NS bolus   - urine electrolytes ordered    Combined hyperlipidemia associated with type 2 diabetes mellitus  - hold simvastatin   - Hgb 5.4 in  1/2017      Dementia without behavioral disturbance  Hold aricept in light of acute encephalopathy       Chronic gout without tophus  Hold allopurinol given liver injury      Coronary artery disease involving native coronary artery without angina pectoris  - s/p CABGx1, stents to LCF&RCF (2011)  - continue aspirin. Hold simvastatin given liver injury       VTE Risk Mitigation         Ordered     heparin (porcine) injection 5,000 Units  Every 12 hours     Route:  Subcutaneous        02/05/17 1222     Low Risk of VTE  Once      02/04/17 7956          Yuri Miranda MD  Department of Hospital Medicine   [facility]

## 2017-02-06 NOTE — CONSULTS
Hepatology Consult    Reason for Consult: Abnormal LFT's     HPI:  This is a 89 year old male with difficulty hearing who initially presented with encephalopathy and lower extremity weakness. Other past history includes dementia CABG and prostate cancer on bicalutamide. We have been consulted to evaluate abnormal LFT's. Levels were last normal 01/13/2017.    Since admit, confusion and weakness has resolved. Stroke work up was unremarkable. Patient is a poor historian therefore I spoke with his son Pawan for more information regarding history. There were no reports of syncope or hypotension. His BP was measured by EMS but the son is not sure the results.     Since admission, his LFT's have been improving. The team has discontinued all hepatotoxic medications. Doppler us was unremarkable. Hepatitis panel is pending at this time. Tox screen was negative.     Constitutional: no fever, chills  Eyes: no visual changes   ENT: no sore throat or dysphagia  Respiratory: no cough or shortness of breath   Cardiovascular: no chest pain or palpitations   Gastrointestinal: as per HPI  Hematologic/Lymphatic: no easy bruising or lymphadenopathy   Musculoskeletal: no arthralgias or myalgias   Neurological: no seizures, tremors or change in mental status  Behavioral/Psych: no auditory or visual hallucinations    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     3 v incl LIMA to LAD       Past Surgical History   Procedure Laterality Date    Coronary artery bypass graft  12-13-01    Coronary stent placement         Family History   Problem Relation Age of Onset    Heart disease Neg Hx     Heart attack Neg Hx     Hypertension Neg Hx        Review of patient's  "allergies indicates:   Allergen Reactions    No known drug allergies        Social History     Social History    Marital status:      Spouse name: N/A    Number of children: N/A    Years of education: N/A     Social History Main Topics    Smoking status: Former Smoker     Quit date: 1989    Smokeless tobacco: None    Alcohol use Yes      Comment: 1/ of vodka every 10 days    Drug use: None    Sexual activity: Not Asked     Other Topics Concern    None     Social History Narrative    2015: Wife  2015 from cancer.    Retired .        aspirin  81 mg Oral Daily    carvedilol  12.5 mg Oral BID WM    doxazosin  2 mg Oral QHS    heparin (porcine)  5,000 Units Subcutaneous Q12H    lactulose  20 g Oral BID       Visit Vitals    /63 (BP Location: Left arm, Patient Position: Sitting, BP Method: Automatic)    Pulse 63    Temp 97.4 °F (36.3 °C) (Oral)    Resp 18    Ht 5' 6" (1.676 m)    Wt 78 kg (172 lb)    SpO2 99%    BMI 27.76 kg/m2     General appearance: alert, appears stated age and cooperative.  Eyes: negative findings: conjunctivae and sclerae normal.  Throat: lips, mucosa, and tongue normal.  Lungs: clear to auscultation bilaterally.  Heart: S1, S2 normal.  Abdomen: soft, non-tender; bowel sounds normal; no masses, no organomegaly.  Skin: spider angiomata, no palmar erythema, no jaundice.  Lymph nodes: No cervical or supraclavicular adenopathy appreciated  Neurologic: Mental status: Alert, oriented, thought content appropriate.  Extremities: no lower extremity edema, no muscle wasting appreciated.      Laboratory:    Recent Labs  Lab 17  1504 17  0657 17  0501    139 139   K 5.4* 4.5 3.9    105 105   CO2 19* 26 24   BUN 26 31* 31*   CREATININE 1.9* 2.1* 1.8*   CALCIUM 9.2 9.1 9.2   PROT 7.1 6.2 6.5   BILITOT 2.0* 1.7* 1.8*   ALKPHOS 148* 132 132   * 327* 264*   * 577* 246*         Recent Labs  Lab " 02/04/17  1504 02/05/17  0657 02/06/17  0501   WBC 5.30 4.33 3.91   HGB 12.6* 12.2* 12.5*   HCT 39.2* 37.5* 38.7*    153 145*         Recent Labs  Lab 02/04/17  1504 02/05/17  0824 02/06/17  0623   INR 1.7* 1.8* 1.4*       MELD-Na score: 18 at 2/6/2017  6:23 AM  MELD score: 18 at 2/6/2017  6:23 AM  Calculated from:  Serum Creatinine: 1.8 mg/dL at 2/6/2017  5:01 AM  Serum Sodium: 139 mmol/L (Rounded to 137) at 2/6/2017  5:01 AM  Total Bilirubin: 1.8 mg/dL at 2/6/2017  5:01 AM  INR(ratio): 1.4 at 2/6/2017  6:23 AM  Age: 89 years    Assessment:  This is a 89 year old male with difficulty hearing who initially presented with encephalopathy and lower extremity weakness. Other past history includes dementia CABG and prostate cancer on bicalutamide. We have been consulted to evaluate abnormal LFT's. Levels were last normal 01/13/2017.    1. Abnormal LFT's, hepatocellular pattern suggestive of possible ischemic hepatopathy. Less likely fdrug effect given rapid improvement since admission.     Plan:  1. Continue to monitor LFT's.  2. If no continued improvement, we may pursue biopsy.   3. Continue to hold hepatotoxic drugs at this time.   4. Follow up acute viral hepatitis panel.  5. Plan discussed with Dr. Lee and patient's son - Pawan.     Tammy Guzmán MD PGY-5  Gastroenterology Fellow  Pager# [phone]

## 2017-02-06 NOTE — SUBJECTIVE & OBJECTIVE
Interval History: NAEON. Patient sitting comfortably in bed. A little more confused this morning, oriented to person but not place or time. No focal neurologic deficits    Review of Systems   Constitutional: Negative for chills, fatigue and fever.   HENT: Negative for drooling and trouble swallowing.    Respiratory: Positive for shortness of breath. Negative for cough.    Cardiovascular: Positive for leg swelling. Negative for chest pain and palpitations.   Gastrointestinal: Negative for abdominal pain, constipation, diarrhea, nausea and vomiting.   Genitourinary: Negative for dysuria and hematuria.   Musculoskeletal: Positive for arthralgias. Negative for back pain, joint swelling and myalgias.   Skin: Negative for color change.   Neurological: Negative for dizziness, syncope, facial asymmetry, weakness, light-headedness, numbness and headaches.     Objective:     Vital Signs (Most Recent):  Temp: 97.3 °F (36.3 °C) (02/06/17 0411)  Pulse: (!) 59 (02/06/17 0411)  Resp: 16 (02/06/17 0411)  BP: (!) 164/58 (02/06/17 0411)  SpO2: 100 % (02/06/17 0411) Vital Signs (24h Range):  Temp:  [97.3 °F (36.3 °C)-98.3 °F (36.8 °C)] 97.3 °F (36.3 °C)  Pulse:  [53-65] 59  Resp:  [16-18] 16  SpO2:  [95 %-100 %] 100 %  BP: (121-164)/(56-78) 164/58     Weight: 78 kg (172 lb)  Body mass index is 27.76 kg/(m^2).  No intake or output data in the 24 hours ending 02/06/17 0533   Physical Exam   Constitutional: He is oriented to person, place, and time. He appears well-developed and well-nourished.   HENT:   Head: Normocephalic and atraumatic.   Right Ear: External ear normal.   Left Ear: External ear normal.   Eyes: EOM are normal.   Neck: Normal range of motion. Neck supple.   Cardiovascular: Normal rate, regular rhythm, normal heart sounds and intact distal pulses.    Pulmonary/Chest: Effort normal. He has no wheezes. He exhibits no tenderness.   Mild crackles at the base of lungs (R>L)   Abdominal: Soft. Bowel sounds are normal.    Musculoskeletal:   1+ bilateral LE pitting edema   Neurological: He is alert and oriented to person, place, and time. No cranial nerve deficit.   4/5 strength UE and LE  No drift, no facial droop     Skin: Skin is warm and dry.   Psychiatric: He has a normal mood and affect. His behavior is normal.       Significant Labs: All pertinent labs within the past 24 hours have been reviewed.    Significant Imaging: I have reviewed all pertinent imaging results/findings within the past 24 hours.

## 2017-02-06 NOTE — PLAN OF CARE
Problem: Occupational Therapy Goal  Goal: Occupational Therapy Goal  Goals to be met by: 2/18/17     Patient will increase functional independence with ADLs by performing:    LE Dressing with [place].  Grooming while standing at sink with Modified [place].  Toileting from toilet with [place] for hygiene and clothing management.   Stand pivot transfers with Modified [place].  Toilet transfer to toilet with Modified [place].  Outcome: Ongoing (interventions implemented as appropriate)  OT eval completed. The above goals are established to improve functional (I) and mobility.     SRINI Leal  2/6/2017  Pager: [phone]

## 2017-02-06 NOTE — PLAN OF CARE
02/06/17 1010   Discharge Assessment   Assessment Type Discharge Planning Assessment   Confirmed/corrected address and phone number on facesheet? Yes   Assessment information obtained from? Caregiver  (son)   Expected Length of Stay (days) 3   Communicated expected length of stay with patient/caregiver yes   Prior to hospitilization cognitive status: Alert/Oriented   Prior to hospitalization functional status: Independent   Current cognitive status: Not Oriented to Place;Not Oriented to Time   Current Functional Status: Independent   Arrived From home or self-care   Lives With alone  (son next door)   Able to Return to Prior Arrangements unable to determine at this time (comments)   Is patient able to care for self after discharge? Unable to determine at this time (comments)   How many people do you have in your home that can help with your care after discharge? 1   Who are your caregiver(s) and their phone number(s)? (Ferny Tran, son, [phone])   Patient's perception of discharge disposition home or selfcare   Readmission Within The Last 30 Days no previous admission in last 30 days   Patient currently being followed by outpatient case management? No   Patient currently receives home health services? No   Does the patient currently use HME? Yes   Patient currently receives private duty nursing? N/A   Patient currently receives any other outside agency services? No   Equipment Currently Used at Home cane, straight;shower chair   Do you have any problems affording any of your prescribed medications? No   Is the patient taking medications as prescribed? yes   Do you have any financial concerns preventing you from receiving the healthcare you need? No   Does the patient have transportation to healthcare appointments? Yes   Transportation Available family or friend will provide   On Dialysis? No   Does the patient receive services at the Coumadin Clinic? No   Are there any open cases? No   Discharge Plan A Home    Discharge Plan B Home;Home with family;Home Health   Patient/Family In Agreement With Plan yes     Jordon Spence MD  [address] [facility] / [place] [address]      [facility] - [place], [place] - [address]  [address]  [place] [address]  Phone: [phone] Fax: [phone]      Payor: [employer] MEDICARE / Plan: [employer] MEDICARE HMO / Product Type: Capitation /

## 2017-02-06 NOTE — PROGRESS NOTES
Pt sitting in chair fully clothed this morning.  Son in room at time. Set up with breakfast after medication. Hard of hearing and requests I tell all information to son in room. Son requesting D/C info, but none documented at this point.    Will continue to monitor.

## 2017-02-06 NOTE — ASSESSMENT & PLAN NOTE
- Likely TIA given complete resolution of symptoms vs hepatic encephalopathy   - CT neg for acute stroke or hemorrhage (chronic microvascular changes)  - UA neg for infection  - CXR neg for consolidation or patchy opacities  - Given 1 dose of aspirin 325 mg   - continue aspirin 81 mg  - Started lactulose to cover for hepatic encephalopathy

## 2017-02-06 NOTE — ASSESSMENT & PLAN NOTE
- Could be due to hepatic congestion given history of heart failure  - More likely cause is polypharmacy as patient is taking medications that could contribute to liver damage. Will hold these medications (allupurinol, statin, bicalutamide)  - Ordered acute hep panel  - Acetomenopin, EtOH levels wnl  - Liver US shows no evidence of parenchymal abnormality or thrombosis of portal/hepatic veins   - consider Hepatology consult

## 2017-02-06 NOTE — PT/OT/SLP EVAL
Occupational Therapy  Evaluation    Orlando Tran Jr.   MRN: [id number]   Admitting Diagnosis: Acute liver failure without hepatic coma    OT Date of Treatment: 02/06/17   OT Start Time: 1309  OT Stop Time: 1328  OT Total Time (min): 19 min    Billable Minutes:  Evaluation 19 minutes    Diagnosis: Acute liver failure without hepatic coma   Decreased endurance, gait instability    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     3 v incl LIMA to LAD      Past Surgical History   Procedure Laterality Date    Coronary artery bypass graft  12-13-01    Coronary stent placement         Referring physician: DELLA Lee  Date referred to OT: 2/6/2017    General Precautions: Standard, fall    Do you have any cultural, spiritual, Sikhism conflicts, given your current situation?: none stated     Patient History:  Living Environment  Lives With: alone  Living Arrangements: house  Home Accessibility: stairs to enter home  Number of Stairs to Enter Home: 2  Stair Railings at Home: none  Transportation Available: family or friend will provide  Living Environment Comment: Pt lives alone in a 1SH with 2 VIDAL and no rail, has a walk-in shower with shower chair and grab bars, and regular toilet. Son lives next door and is available at different times depending on work schedule. Pt reports he ambulates with SPC and is (I) in ADLs.  Equipment Currently Used at Home: cane, straight, shower chair, walker, standard    Prior level of function:   Bed Mobility/Transfers: needs device  Grooming: independent  Bathing: independent  Upper Body Dressing: independent  Lower Body Dressing: independent  Toileting: independent  Home Management Skills: needs assist  Homemaking  "Responsibilities: Yes  Mode of Transportation: Family     Dominant hand: right    Subjective:  Communicated with RN prior to session.  "I'm ready to go home."  Chief Complaint: none  Patient/Family stated goals: go home    Pain Ratin/10  Pain Rating Post-Intervention: 0/10    Objective:   Pt found UIC and agreeable to coeval with OT/PT    Cognitive Exam:  Oriented to: Person, Place and Situation  Follows Commands/attention: Follows multistep  commands  Communication: clear/fluent  Memory:  Impaired STM  Safety awareness/insight to disability: impaired  Coping skills/emotional control: Appropriate to situation    Visual/perceptual:  Intact    Physical Exam:  Postural examination/scapula alignment: Rounded shoulder and Head forward  Skin integrity: Visible skin intact  Edema: None noted     Sensation:   Intact    Upper Extremity Range of Motion:  Right Upper Extremity: WFL  Left Upper Extremity: WFL    Upper Extremity Strength:  Right Upper Extremity: WFL  Left Upper Extremity: WFL   Strength: Good    Fine motor coordination:   Intact    Gross motor coordination: WFL    Functional Mobility:  Bed Mobility:   Found UIC, left UIC    Transfers:  Sit <> Stand Assistance: Stand By Assistance  Sit <> Stand Assistive Device: No Assistive Device  Bed <> Chair Technique: Stand Pivot  Bed <> Chair Transfer Assistance: Stand By Assistance  Bed <> Chair Assistive Device: No Assistive Device  Toilet Transfer Technique: Stand Pivot  Toilet Transfer Assistance: Independent (per pt report)  Toilet Transfer Assistive Device: No Assistive Device    Functional Ambulation: CGA progressing to Min A with SPC; CGA with RW and verbal cues for use.    Activities of Daily Living:    UE Dressing Level of Assistance: SBA (doff/don jacket while in stance)  LE Dressing Level of Assistance: Supervision (doff/don socks and shoes while in recliner)  Toileting Where Assessed: Toilet  Toileting Level of Assistance: Independent (per pt " "report)    Balance:   Static Sit: NORMAL: No deviations seen in posture held statically  Dynamic Sit: GOOD+: Maintains balance through MAXIMAL excursions of active trunk motion  Static Stand: GOOD: Takes MODERATE challenges from all directions  Dynamic stand: FAIR: Needs CONTACT GUARD during gait    Therapeutic Activities and Exercises:  Pt ed re OT role and POC. Pt performed strength and ROM testing. Pt performed sit to stand t/f with SBA and ambulated with SPC and CGA. Pt with LOB requiring Min A to correct. Pt ambulated with RW and CGA and requiring v/c for proper use. Pt returned to room and sat in recliner. Pt stood with SBA and doff/donned jacket with SBA. Pt sat in recliner. Pt doff/donned socks and shoes with S. Pt reported using the bathroom (I)ly.    AM-PAC 6 CLICK ADL  How much help from another person does this patient currently need?  1 = Unable, Total/Dependent Assistance  2 = A lot, Maximum/Moderate Assistance  3 = A little, Minimum/Contact Guard/Supervision  4 = None, Modified [place]/Independent    Putting on and taking off regular lower body clothing? : 4  Bathing (including washing, rinsing, drying)?: 3  Toileting, which includes using toilet, bedpan, or urinal? : 4  Putting on and taking off regular upper body clothing?: 4  Taking care of personal grooming such as brushing teeth?: 4  Eating meals?: 4  Total Score: 23    AM-PAC Raw Score CMS "G-Code Modifier Level of Impairment Assistance   6 % Total / Unable   7 - 9 CM 80 - 100% Maximal Assist   10 - 14 CL 60 - 80% Moderate Assist   15 - 19 CK 40 - 60% Moderate Assist   20 - 22 CJ 20 - 40% Minimal Assist   23 CI 1-20% SBA / CGA   24 CH 0% Independent/ Mod I       Patient left up in chair with son present    Assessment:  Orlando Tran Jr. is a 89 y.o. male with a medical diagnosis of Acute liver failure without hepatic coma and presents with the impairments listed below. Pt is pleasant and motivated to go home. Pt would benefit from " cont OT to maximize safety and endurance during self care tasks.    Rehab identified problem list/impairments: Rehab identified problem list/impairments: impaired endurance, impaired functional mobilty, gait instability, impaired balance, decreased safety awareness, impaired cognition    Rehab potential is good.    Activity tolerance: Good    Discharge recommendations: Discharge Facility/Level Of Care Needs: home with home health     Barriers to discharge: Barriers to Discharge: Inaccessible home environment, Decreased caregiver support    Equipment recommendations: walker, rolling     GOALS:   Occupational Therapy Goals        Problem: Occupational Therapy Goal    Goal Priority Disciplines Outcome Interventions   Occupational Therapy Goal     OT, PT/OT Ongoing (interventions implemented as appropriate)    Description:  Goals to be met by: 2/18/17     Patient will increase functional independence with ADLs by performing:    LE Dressing with [place].  Grooming while standing at sink with Modified [place].  Toileting from toilet with [place] for hygiene and clothing management.   Stand pivot transfers with Modified [place].  Toilet transfer to toilet with Modified [place].                PLAN:  Patient to be seen 3 x/week to address the above listed problems via self-care/home management, therapeutic activities, therapeutic exercises  Plan of Care expires: 03/06/17  Plan of Care reviewed with: patient, preet Bermudez SRINI Owen  2/6/2017  Pager: [phone]

## 2017-02-06 NOTE — ASSESSMENT & PLAN NOTE
- CXR shows mild right pleural effusion, no bilateral opacities  - BNP elevated to 2656 (baseline 1158)  - Received IV lasix 80 mg in ED  - Strict ins/out  - Holding home lasix given JEREMIAH

## 2017-02-07 VITALS
BODY MASS INDEX: 27.64 KG/M2 | HEART RATE: 54 BPM | TEMPERATURE: 98 F | SYSTOLIC BLOOD PRESSURE: 120 MMHG | DIASTOLIC BLOOD PRESSURE: 60 MMHG | HEIGHT: 66 IN | WEIGHT: 172 LBS | OXYGEN SATURATION: 98 % | RESPIRATION RATE: 18 BRPM

## 2017-02-07 PROBLEM — R41.82 ALTERED MENTAL STATUS: Status: RESOLVED | Noted: 2017-02-04 | Resolved: 2017-02-07

## 2017-02-07 LAB
ALBUMIN SERPL BCP-MCNC: 2.9 G/DL
ALP SERPL-CCNC: 131 U/L
ALT SERPL W/O P-5'-P-CCNC: 238 U/L
ANION GAP SERPL CALC-SCNC: 10 MMOL/L
AST SERPL-CCNC: 174 U/L
BASOPHILS # BLD AUTO: 0.02 K/UL
BASOPHILS NFR BLD: 0.5 %
BILIRUB SERPL-MCNC: 1.5 MG/DL
BUN SERPL-MCNC: 25 MG/DL
CALCIUM SERPL-MCNC: 9.1 MG/DL
CHLORIDE SERPL-SCNC: 105 MMOL/L
CO2 SERPL-SCNC: 23 MMOL/L
CREAT SERPL-MCNC: 1.7 MG/DL
DIFFERENTIAL METHOD: ABNORMAL
EOSINOPHIL # BLD AUTO: 0.1 K/UL
EOSINOPHIL NFR BLD: 2.8 %
ERYTHROCYTE [DISTWIDTH] IN BLOOD BY AUTOMATED COUNT: 14.7 %
EST. GFR  (AFRICAN AMERICAN): 39.6 ML/MIN/1.73 M^2
EST. GFR  (NON AFRICAN AMERICAN): 34.2 ML/MIN/1.73 M^2
GLUCOSE SERPL-MCNC: 86 MG/DL
HCT VFR BLD AUTO: 37.5 %
HGB BLD-MCNC: 12 G/DL
INR PPP: 1.3
LYMPHOCYTES # BLD AUTO: 1.2 K/UL
LYMPHOCYTES NFR BLD: 28.5 %
MCH RBC QN AUTO: 28.7 PG
MCHC RBC AUTO-ENTMCNC: 32 %
MCV RBC AUTO: 90 FL
MONOCYTES # BLD AUTO: 0.4 K/UL
MONOCYTES NFR BLD: 9 %
NEUTROPHILS # BLD AUTO: 2.6 K/UL
NEUTROPHILS NFR BLD: 59 %
PLATELET # BLD AUTO: 150 K/UL
PMV BLD AUTO: 10.8 FL
POTASSIUM SERPL-SCNC: 3.5 MMOL/L
PROT SERPL-MCNC: 6.7 G/DL
PROTHROMBIN TIME: 13 SEC
RBC # BLD AUTO: 4.18 M/UL
SODIUM SERPL-SCNC: 138 MMOL/L
WBC # BLD AUTO: 4.32 K/UL

## 2017-02-07 PROCEDURE — 85025 COMPLETE CBC W/AUTO DIFF WBC: CPT

## 2017-02-07 PROCEDURE — 36415 COLL VENOUS BLD VENIPUNCTURE: CPT

## 2017-02-07 PROCEDURE — 25000003 PHARM REV CODE 250: Performed by: HOSPITALIST

## 2017-02-07 PROCEDURE — 25000003 PHARM REV CODE 250: Performed by: INTERNAL MEDICINE

## 2017-02-07 PROCEDURE — 80053 COMPREHEN METABOLIC PANEL: CPT

## 2017-02-07 PROCEDURE — 99239 HOSP IP/OBS DSCHRG MGMT >30: CPT | Mod: GC,,, | Performed by: INTERNAL MEDICINE

## 2017-02-07 PROCEDURE — 85610 PROTHROMBIN TIME: CPT

## 2017-02-07 RX ORDER — ALLOPURINOL 100 MG/1
100 TABLET ORAL DAILY
Qty: 30 TABLET | Refills: 1 | Status: SHIPPED | OUTPATIENT
Start: 2017-02-07

## 2017-02-07 RX ADMIN — LACTULOSE 20 G: 10 SOLUTION ORAL at 08:02

## 2017-02-07 RX ADMIN — HEPARIN SODIUM 5000 UNITS: 5000 INJECTION, SOLUTION INTRAVENOUS; SUBCUTANEOUS at 08:02

## 2017-02-07 RX ADMIN — ASPIRIN 81 MG CHEWABLE TABLET 81 MG: 81 TABLET CHEWABLE at 08:02

## 2017-02-07 RX ADMIN — PHYTONADIONE 10 MG: 5 TABLET ORAL at 08:02

## 2017-02-07 RX ADMIN — CARVEDILOL 12.5 MG: 12.5 TABLET, FILM COATED ORAL at 08:02

## 2017-02-07 RX ADMIN — FUROSEMIDE 40 MG: 40 TABLET ORAL at 08:02

## 2017-02-07 NOTE — ASSESSMENT & PLAN NOTE
- Cr (baseline 1.5) improved  - May be due to poor po intake prior to admission   - s/p 250 cc NS bolus   - urine electrolytes were previously ordered

## 2017-02-07 NOTE — ASSESSMENT & PLAN NOTE
- Possibly TIA given complete resolution of symptoms vs hepatic encephalopathy   - CT neg for acute stroke or hemorrhage (chronic microvascular changes)  - UA neg for infection  - CXR neg for consolidation or patchy opacities  - s/p 1 dose of aspirin 325 mg   - continue aspirin 81 mg  - lactulose to cover for hepatic encephalopathy, discontinue now given resolution of symptoms

## 2017-02-07 NOTE — PROGRESS NOTES
[facility] Hospital Medicine  Progress Note    Patient Name: Orlando Tran Jr.  MRN: [id number]  Patient Class: IP- Inpatient   Admission Date: 2/4/2017  Length of Stay: 3 days  Attending Physician: Reyes Mai MD  Primary Care Provider: Jordon Spence MD    [facility] Medicine Team: [facility] HOSP MED 5 Haley Correa MD    Subjective:     Principal Problem:Acute liver failure without hepatic coma    HPI:  89 yo male with PMH of chronic diastolic HF, CAD s/p CABG x 1, HTN, prostate adenocarcinoma, HLD, CKD-III, venous insufficiency who presents to [facility] for acute encephalopathy and bilateral leg weakness. Patient was is his normal state of health until this afternoon when patient's son saw patient sitting in the car staring at him blankly and not as responsive as per usual. The son attempted to help him exit the car, and the patient was unable to hold himself up due to lower extremity weakness. Patient's son denies loss of consciousness, urinary/bowel incontinence, or convulsion. When EMS arrived they noted expressive aphasia but facial droop or drift. Patient's son notes that patient has returned to his baseline in the ED. Son also mentions that patient has been a little more short of breath with exertion, but denies orthopnea, increased LE edema. Patient has had a good appetite lately but does not avoid salty foods.    Hospital Course:  CT head in ED was negative for acute hemorrhages or infarctions and no masses. CXR showed mild cardiomegaly.small right effusion. He was given IV lasix 80 mg in ED. Liver enzymes were elevated on admission so liver injury work-up was initiated. All meds that could cause liver injury were held. US negative for portal vein thrombosis. Hepatology was consulted and suspect ischemic hepatitis versus medications.         Interval History: no acute events overnight.     Review of Systems   Constitutional: Negative for activity change and fatigue.   Respiratory: Negative for  cough, shortness of breath and wheezing.    Cardiovascular: Negative for chest pain and palpitations.   Gastrointestinal: Negative for abdominal pain, nausea and vomiting.   Genitourinary: Negative for difficulty urinating and dysuria.   Skin: Negative for rash and wound.   Neurological: Negative for dizziness and light-headedness.     Objective:     Vital Signs (Most Recent):  Temp: 97.8 °F (36.6 °C) (02/07/17 1228)  Pulse: (!) 54 (02/07/17 1228)  Resp: 18 (02/07/17 1228)  BP: 124/60 (02/07/17 1228)  SpO2: 98 % (02/07/17 1228) Vital Signs (24h Range):  Temp:  [97.5 °F (36.4 °C)-97.8 °F (36.6 °C)] 97.8 °F (36.6 °C)  Pulse:  [51-58] 54  Resp:  [16-18] 18  SpO2:  [97 %-100 %] 98 %  BP: (120-138)/(59-60) 124/60     Weight: 78 kg (172 lb)  Body mass index is 27.76 kg/(m^2).    Intake/Output Summary (Last 24 hours) at 02/07/17 1422  Last data filed at 02/07/17 1200   Gross per 24 hour   Intake              420 ml   Output                0 ml   Net              420 ml      Physical Exam   Constitutional: He appears well-developed and well-nourished.   HENT:   Head: Normocephalic and atraumatic.   Right Ear: External ear normal.   Left Ear: External ear normal.   Eyes: EOM are normal.   Neck: Normal range of motion. Neck supple.   Cardiovascular: Normal rate, regular rhythm, normal heart sounds and intact distal pulses.    Pulmonary/Chest: Effort normal. He has no wheezes. He exhibits no tenderness.   Abdominal: Soft. Bowel sounds are normal. He exhibits no distension. There is no tenderness.   Musculoskeletal:   1+ bilateral LE pitting edema   Neurological: He is alert. No cranial nerve deficit.   Skin: Skin is warm and dry.   Psychiatric: He has a normal mood and affect. His behavior is normal.       Significant Labs:   CBC:   Recent Labs  Lab 02/06/17  0501 02/07/17  0529   WBC 3.91 4.32   HGB 12.5* 12.0*   HCT 38.7* 37.5*   * 150     CMP:   Recent Labs  Lab 02/06/17  0501 02/07/17  0529    138   K 3.9 3.5     105   CO2 24 23   GLU 94 86   BUN 31* 25   CREATININE 1.8* 1.7*   CALCIUM 9.2 9.1   PROT 6.5 6.7   ALBUMIN 2.8* 2.9*   BILITOT 1.8* 1.5*   ALKPHOS 132 131   * 174*   * 238*   ANIONGAP 10 10   EGFRNONAA 32.0* 34.2*       Significant Imaging: I have reviewed and interpreted all pertinent imaging results/findings within the past 24 hours.    Assessment/Plan:      * Acute liver failure without hepatic coma  - Could be due to hepatic congestion given history of heart failure  - More likely cause is polypharmacy as patient is taking medications that could contribute to liver damage. Will hold these medications (allupurinol, statin, bicalutamide)  - Ordered acute hep panel- negative  - Acetomenopin, EtOH levels wnl  - Liver US shows no evidence of parenchymal abnormality or thrombosis of portal/hepatic veins   - per Hepatology, this is probably a pattern of ischemia, if LFTs don't improve, liver biopsy would be considered.  - improving, will have outpatient labs check to monitor  - hold casodex until can follow up with outpatient urology. Hold simvastatin until priority clinic follow up. Decrease allopurinol to 100mg daily      Combined hyperlipidemia associated with type 2 diabetes mellitus  - hold simvastatin due to liver injury        Hypertension associated with diabetes  - continue coreg  - resume hydralazine   - lasix resumed 02/07/2017        CKD (chronic kidney disease), stage III  - Cr (baseline 1.5) improved      Prostate cancer  - history of prostate adenocarcinoma  - PSA 6/2016 200s. Repeat PSA in 300s  - continue doxazosin  - holding bicalutamide as it may be contributing to to liver injury        Dementia without behavioral disturbance  - on donepezil 5 mg      Chronic gout without tophus  Hoeld allopurinol given liver injury. Will resume at 100mg per day rather than 300mg given age and CKD.       Acute on chronic diastolic heart failure  - CXR shows mild right pleural effusion  - BNP  elevated to 2656 (baseline 1158)  - Received IV lasix 80 mg in ED  - Strict ins/out  - resumed lasix 02/07/2017        Coronary artery disease involving native coronary artery without angina pectoris  - s/p CABGx1, stents to LCF&RCF (2011)  - continue aspirin. Hold simvastatin given liver injury       Acute encephalopathy  - Possibly TIA given complete resolution of symptoms vs hepatic encephalopathy   - CT neg for acute stroke or hemorrhage (chronic microvascular changes)  - UA neg for infection  - CXR neg for consolidation or patchy opacities  - s/p 1 dose of aspirin 325 mg   - continue aspirin 81 mg  - lactulose to cover for hepatic encephalopathy, discontinue now given resolution of symptoms      VTE Risk Mitigation         Ordered     heparin (porcine) injection 5,000 Units  Every 12 hours     Route:  Subcutaneous        02/05/17 1222     Low Risk of VTE  Once      02/04/17 1833          Haley Correa MD  Department of Hospital Medicine   [facility]

## 2017-02-07 NOTE — DISCHARGE SUMMARY
[facility] Hospital Medicine  Discharge Summary      Patient Name: Orlando Tran Jr.  MRN: [id number]  Admission Date: 2/4/2017  Hospital Length of Stay: 3 days  Discharge Date and Time:  02/07/2017 4:09 PM  Attending Physician: Reyes Mai MD   Discharging Provider: Haley Correa MD  Primary Care Provider: Jordon Spence MD  [facility] Medicine Team: [facility] HOSP MED 5 Haley Correa MD    HPI:   89 yo male with PMH of chronic diastolic HF, CAD s/p CABG x 1, HTN, prostate adenocarcinoma, HLD, CKD-III, venous insufficiency who presents to [facility] for acute encephalopathy and bilateral leg weakness. Patient was is his normal state of health until this afternoon when patient's son saw patient sitting in the car staring at him blankly and not as responsive as per usual. The son attempted to help him exit the car, and the patient was unable to hold himself up due to lower extremity weakness. Patient's son denies loss of consciousness, urinary/bowel incontinence, or convulsion. When EMS arrived they noted expressive aphasia but facial droop or drift. Patient's son notes that patient has returned to his baseline in the ED. Son also mentions that patient has been a little more short of breath with exertion, but denies orthopnea, increased LE edema. Patient has had a good appetite lately but does not avoid salty foods.    * No surgery found *      Indwelling Lines/Drains at time of discharge:   Lines/Drains/Airways          No matching active lines, drains, or airways        Hospital Course:   CT head in ED was negative for acute hemorrhages or infarctions and no masses. CXR showed mild cardiomegaly.small right effusion. He was given IV lasix 80 mg in ED. Liver enzymes were elevated on admission so liver injury work-up was initiated. All meds that could cause liver injury were held. US negative for portal vein thrombosis. Hepatology was consulted and suspect ischemic hepatitis versus medications.           Consults:   Consults         Status Ordering Provider     Inpatient consult to Hepatology  Once     Provider:  (Not yet assigned)    Completed TONI LUCAS          Significant Diagnostic Studies: Labs:   CMP   Recent Labs  Lab 02/06/17  0501 02/07/17  0529    138   K 3.9 3.5    105   CO2 24 23   GLU 94 86   BUN 31* 25   CREATININE 1.8* 1.7*   CALCIUM 9.2 9.1   PROT 6.5 6.7   ALBUMIN 2.8* 2.9*   BILITOT 1.8* 1.5*   ALKPHOS 132 131   * 174*   * 238*   ANIONGAP 10 10   ESTGFRAFRICA 36.9* 39.6*   EGFRNONAA 32.0* 34.2*   , CBC   Recent Labs  Lab 02/06/17  0501 02/07/17  0529   WBC 3.91 4.32   HGB 12.5* 12.0*   HCT 38.7* 37.5*   * 150    and INR   Lab Results   Component Value Date    INR 1.3 (H) 02/07/2017    INR 1.4 (H) 02/06/2017    INR 1.8 (H) 02/05/2017     Radiology: Ultrasound: no evidence of portal vein thrombosis or hepatic parenchymal abnormality    Pending Diagnostic Studies:     None        Final Active Diagnoses:    Diagnosis Date Noted POA    PRINCIPAL PROBLEM:  Acute liver failure without hepatic coma [K72.00] 02/04/2017 Yes    Acute encephalopathy [G93.40] 02/04/2017 Yes    Type 2 diabetes mellitus without retinopathy [E11.9] 06/27/2016 Yes    Coronary artery disease involving native coronary artery without angina pectoris [I25.10] 11/13/2015 Yes    Acute on chronic diastolic heart failure [I50.33] 07/24/2015 Yes    Chronic gout without tophus [M1A.9XX0] 07/24/2015 Yes    Dementia without behavioral disturbance [F03.90] 04/02/2015 Yes     Chronic    Prostate cancer [C61] 01/13/2015 Yes    CKD (chronic kidney disease), stage III [N18.3]  Yes    Hypertension associated with diabetes [E11.59, I10]  Yes     Chronic    Combined hyperlipidemia associated with type 2 diabetes mellitus [E11.69, E78.2]  Yes    S/P CABG x 1 - [facility] many yrs ago [Z95.1]  Not Applicable      Problems Resolved During this Admission:    Diagnosis Date Noted Date Resolved POA       No new Assessment & Plan notes have been filed under this hospital service since the last note was generated.  Service: Hospital Medicine      Discharged Condition: good    Disposition: Home or Self Care    Follow Up:  Follow-up Information     Schedule an appointment as soon as possible for a visit with Sachin Finley Jr, MD.    Specialty:  Urology    Contact information:    [address]  [facility] [address]  [phone]          Follow up with Manuel Reevesela - [facility]. Go on 2/14/2017.    Specialty:  [facility]    Why:  11:00am    Contact information:    [address]  [facility] [address] [phone]    Additional information:    [facility]       Patient will follow up with Urology prior to resuming Casodex. He will follow up with [facility] in order to recheck a CMP to ensure transaminitis is improving.     Patient Instructions:     Diet general     Activity as tolerated     Call MD for:  severe uncontrolled pain     Call MD for:  difficulty breathing or increased cough     Call MD for:  persistent dizziness, light-headedness, or visual disturbances     Call MD for:  increased confusion or weakness       Medications:  Reconciled Home Medications:   Current Discharge Medication List      CONTINUE these medications which have CHANGED    Details   allopurinol (ZYLOPRIM) 100 MG tablet Take 1 tablet (100 mg total) by mouth Daily.  Qty: 30 tablet, Refills: 1    Associated Diagnoses: Prescription refill         CONTINUE these medications which have NOT CHANGED    Details   aspirin 81 mg Tab Take 1 tablet by mouth once daily.       carvedilol (COREG) 12.5 MG tablet Take 1 tablet (12.5 mg total) by mouth 2 (two) times daily with meals.  Qty: 180 tablet, Refills: 3    Associated Diagnoses: Hypertension associated with diabetes; Coronary artery disease due to lipid rich plaque; S/P CABG x 1      donepezil (ARICEPT) 5 MG tablet  Take 1 tablet (5 mg total) by mouth once daily.  Qty: 90 tablet, Refills: 3    Associated Diagnoses: Prescription refill      doxazosin (CARDURA) 2 MG tablet Take 1 tablet (2 mg total) by mouth every evening.  Qty: 90 tablet, Refills: 3      furosemide (LASIX) 40 MG tablet Take 1 tablet (40 mg total) by mouth 2 (two) times daily.  Qty: 180 tablet, Refills: 3    Associated Diagnoses: Prescription refill      hydrALAZINE (APRESOLINE) 50 MG tablet Take 1 tablet (50 mg total) by mouth every 12 (twelve) hours.  Qty: 180 tablet, Refills: 3    Associated Diagnoses: Prescription refill         STOP taking these medications       bicalutamide (CASODEX) 50 MG Tab Comments:   Reason for Stopping:         simvastatin (ZOCOR) 40 MG tablet Comments:   Reason for Stopping:                 Haley Correa MD  Department of Hospital Medicine  [facility]

## 2017-02-07 NOTE — PROGRESS NOTES
"Notified son, Pawan Tran, at [phone], to inform of pending discharge today with home health. Stated understanding and agreement with plan.  States he "might be a while due to the weather".  "

## 2017-02-07 NOTE — PLAN OF CARE
Problem: Patient Care Overview  Goal: Plan of Care Review  Outcome: Ongoing (interventions implemented as appropriate)  ABDIRAHMAN. Pt didn't sleep, sat up in chair watching TV. Multiple staff members tried to get pt to at least get in the bed, but he refused everyone. Unable to collect urine sample needed bc pt wouldn't void in the urinal even though pt instructed multiple times by staff. Pt remained free of falls or injury. VS and assessment completed per order. Pt progressing towards goals as tolerated. Plan of care established with pt during the initial visit to pt's room. All concerns and questions addressed.

## 2017-02-07 NOTE — SUBJECTIVE & OBJECTIVE
"Interval History: Unable to convey if any issues are bothering him secondary to current mental status or hearing issues.  Per hepatology, if LFTs do not improve further, a liver biopsy may be considered.      Review of Systems   Unable to perform ROS: Mental status change     Objective:     Vital Signs (Most Recent):  Temp: 97.5 °F (36.4 °C) (02/06/17 1649)  Pulse: (!) 56 (02/06/17 1649)  Resp: 16 (02/06/17 1649)  BP: (!) 127/59 (02/06/17 1649)  SpO2: 100 % (02/06/17 1649) Vital Signs (24h Range):  Temp:  [97.3 °F (36.3 °C)-97.6 °F (36.4 °C)] 97.5 °F (36.4 °C)  Pulse:  [53-63] 56  Resp:  [16-18] 16  SpO2:  [99 %-100 %] 100 %  BP: (127-164)/(58-63) 127/59     Weight: 78 kg (172 lb)  Body mass index is 27.76 kg/(m^2).  No intake or output data in the 24 hours ending 02/06/17 1917   Physical Exam   Constitutional: He appears well-developed and well-nourished.   HENT:   Head: Normocephalic and atraumatic.   Right Ear: External ear normal.   Left Ear: External ear normal.   Eyes: EOM are normal.   Neck: Normal range of motion. Neck supple.   Cardiovascular: Normal rate, regular rhythm, normal heart sounds and intact distal pulses.    Pulmonary/Chest: Effort normal. He has no wheezes. He exhibits no tenderness.   Abdominal: Soft. Bowel sounds are normal.   Musculoskeletal:   1+ bilateral LE pitting edema   Neurological: He is alert. No cranial nerve deficit.   4/5 strength UE and LE  No drift, no facial droop    When asked about any complaints, responds with "i can't understand what you're saying"   Skin: Skin is warm and dry.   Psychiatric: He has a normal mood and affect. His behavior is normal.       Significant Labs: All pertinent labs within the past 24 hours have been reviewed.    Significant Imaging: I have reviewed all pertinent imaging results/findings within the past 24 hours.  "

## 2017-02-07 NOTE — PLAN OF CARE
02/07/17 1329   Final Note   Assessment Type Final Discharge Note   Discharge Disposition Home-Health   Discharge planning education complete? Yes   Hospital Follow Up  Appt(s) scheduled? Yes   Discharge plans and expectations educations in teach back method with documentation complete? Yes   Offered [facility] Bedside Delivery? n/a   Discharge/Hospital Encounter Summary to ([facility]) PCP n/a   Referral to Outpatient Case Management complete? n/a   Referral to / orders for Home Health Complete? Yes   30 day supply of medicines given at discharge, if documented non-compliance / non-adherence? n/a   Any social issues identified prior to discharge? No   Did you assess the readiness or willingness of the family or caregiver to support self management of care? Yes     Future Appointments  Date Time Provider Department Center   2/8/2017 11:30 AM Corby Pires MD [facility] CARDIO [facility]   2/8/2017 1:30 PM Sachin Finley Jr., MD [facility] UROLOGY [facility]   2/14/2017 11:00 AM PERLA Cordova [facility] IMPRICL Manuel ela PCW   3/8/2017 10:00 AM LAB, SAME DAY [facility] LAB VNP [facility]   3/8/2017 10:15 AM SPECIMEN, [facility] SPECLAB [facility]   3/13/2017 11:20 AM Gregg Self MD [facility] NEPHRO Manuel Chavarria       Pt home with HH and family

## 2017-02-07 NOTE — SUBJECTIVE & OBJECTIVE
Interval History: no acute events overnight.     Review of Systems   Constitutional: Negative for activity change and fatigue.   Respiratory: Negative for cough, shortness of breath and wheezing.    Cardiovascular: Negative for chest pain and palpitations.   Gastrointestinal: Negative for abdominal pain, nausea and vomiting.   Genitourinary: Negative for difficulty urinating and dysuria.   Skin: Negative for rash and wound.   Neurological: Negative for dizziness and light-headedness.     Objective:     Vital Signs (Most Recent):  Temp: 97.8 °F (36.6 °C) (02/07/17 1228)  Pulse: (!) 54 (02/07/17 1228)  Resp: 18 (02/07/17 1228)  BP: 124/60 (02/07/17 1228)  SpO2: 98 % (02/07/17 1228) Vital Signs (24h Range):  Temp:  [97.5 °F (36.4 °C)-97.8 °F (36.6 °C)] 97.8 °F (36.6 °C)  Pulse:  [51-58] 54  Resp:  [16-18] 18  SpO2:  [97 %-100 %] 98 %  BP: (120-138)/(59-60) 124/60     Weight: 78 kg (172 lb)  Body mass index is 27.76 kg/(m^2).    Intake/Output Summary (Last 24 hours) at 02/07/17 1422  Last data filed at 02/07/17 1200   Gross per 24 hour   Intake              420 ml   Output                0 ml   Net              420 ml      Physical Exam   Constitutional: He appears well-developed and well-nourished.   HENT:   Head: Normocephalic and atraumatic.   Right Ear: External ear normal.   Left Ear: External ear normal.   Eyes: EOM are normal.   Neck: Normal range of motion. Neck supple.   Cardiovascular: Normal rate, regular rhythm, normal heart sounds and intact distal pulses.    Pulmonary/Chest: Effort normal. He has no wheezes. He exhibits no tenderness.   Abdominal: Soft. Bowel sounds are normal. He exhibits no distension. There is no tenderness.   Musculoskeletal:   1+ bilateral LE pitting edema   Neurological: He is alert. No cranial nerve deficit.   Skin: Skin is warm and dry.   Psychiatric: He has a normal mood and affect. His behavior is normal.       Significant Labs:   CBC:   Recent Labs  Lab 02/06/17  0501  02/07/17  0529   WBC 3.91 4.32   HGB 12.5* 12.0*   HCT 38.7* 37.5*   * 150     CMP:   Recent Labs  Lab 02/06/17  0501 02/07/17  0529    138   K 3.9 3.5    105   CO2 24 23   GLU 94 86   BUN 31* 25   CREATININE 1.8* 1.7*   CALCIUM 9.2 9.1   PROT 6.5 6.7   ALBUMIN 2.8* 2.9*   BILITOT 1.8* 1.5*   ALKPHOS 132 131   * 174*   * 238*   ANIONGAP 10 10   EGFRNONAA 32.0* 34.2*       Significant Imaging: I have reviewed and interpreted all pertinent imaging results/findings within the past 24 hours.

## 2017-02-07 NOTE — ASSESSMENT & PLAN NOTE
- Could be due to hepatic congestion given history of heart failure  - More likely cause is polypharmacy as patient is taking medications that could contribute to liver damage. Will hold these medications (allupurinol, statin, bicalutamide)  - Ordered acute hep panel. Pending.   - Acetomenopin, EtOH levels wnl  - Liver US shows no evidence of parenchymal abnormality or thrombosis of portal/hepatic veins   - per Hepatology, this is probably a pattern of ischemia, if LFTs don't improve, liver biopsy would be considered.

## 2017-02-07 NOTE — PLAN OF CARE
Problem: Pressure Ulcer Risk (Onur Scale) (Adult,Obstetrics,Pediatric)  Intervention: Turn/Reposition Often  No skin breakdown noted.

## 2017-02-07 NOTE — PLAN OF CARE
SW spoke to pt's son.  No preference for HH.  Referral sent to [place]  via [facility].    Alie Dorman LMSW

## 2017-02-07 NOTE — PLAN OF CARE
[facility]    HOME HEALTH ORDERS  FACE TO FACE ENCOUNTER    Patient Name: Orlando Tran Jr.  YOB: 1924    PCP: Jordon Spnece MD   PCP Address: [address] / [place] [address]  PCP Phone Number: [phone]  PCP Fax: [phone]    Encounter Date: 02/07/2017    Admit to Home Health    Diagnoses:  Active Hospital Problems    Diagnosis  POA    *Acute liver failure without hepatic coma [K72.00]  Yes    Acute encephalopathy [G93.40]  Yes    Type 2 diabetes mellitus without retinopathy [E11.9]  Yes    Coronary artery disease involving native coronary artery without angina pectoris [I25.10]  Yes     s/p stents to LCF & RCA - patent 2011        Acute on chronic diastolic heart failure [I50.33]  Yes    Chronic gout without tophus [M1A.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       Future Appointments  Date Time Provider Department Center   2/8/2017 11:30 AM Corby Pires MD [facility] CARDIO [facility]   2/8/2017 1:30 PM Sachin Finley Jr., MD [facility] UROLOGY [facility]   3/8/2017 10:00 AM LAB, SAME DAY [facility] LAB VNP [facility]   3/8/2017 10:15 AM SPECIMEN, [facility] SPECLAB [facility]   3/13/2017 11:20 AM Gregg Self MD [facility] NEPHRO [facility]     Follow-up Information     Schedule an appointment as soon as possible for a visit with Sachin Finley Jr, MD.    Specialty:  Urology    Contact information:    [address]  [facility] [address]  [phone]              I have seen and  examined this patient face to face today. My clinical findings that support the need for the home health skilled services and home bound status are the following:  Weakness/numbness causing balance and gait disturbance due to Malignancy/Cancer making it taxing to leave home.    Allergies:  Review of patient's allergies indicates:   Allergen Reactions    No known drug allergies        Diet: regular diet    Activities: activity as tolerated    Nursing:   SN to complete comprehensive assessment including routine vital signs. Instruct on disease process and s/s of complications to report to MD. Review/verify medication list sent home with the patient at time of discharge  and instruct patient/caregiver as needed. Frequency may be adjusted depending on start of care date.    Notify MD if SBP > 160 or < 90; DBP > 90 or < 50; HR > 120 or < 50; Temp > 101      CONSULTS:    Physical Therapy to evaluate and treat. Evaluate for home safety and equipment needs; Establish/upgrade home exercise program. Perform / instruct on therapeutic exercises, gait training, transfer training, and Range of Motion.  Occupational Therapy to evaluate and treat. Evaluate home environment for safety and equipment needs. Perform/Instruct on transfers, ADL training, ROM, and therapeutic exercises.   to evaluate for community resources/long-range planning.    MISCELLANEOUS CARE:  N/A    WOUND CARE ORDERS  n/a      Medications: Review discharge medications with patient and family and provide education.      Current Discharge Medication List      CONTINUE these medications which have CHANGED    Details   allopurinol (ZYLOPRIM) 100 MG tablet Take 1 tablet (100 mg total) by mouth Daily.  Qty: 30 tablet, Refills: 1    Associated Diagnoses: Prescription refill         CONTINUE these medications which have NOT CHANGED    Details   aspirin 81 mg Tab Take 1 tablet by mouth once daily.       carvedilol (COREG) 12.5 MG tablet Take 1 tablet (12.5  mg total) by mouth 2 (two) times daily with meals.  Qty: 180 tablet, Refills: 3    Associated Diagnoses: Hypertension associated with diabetes; Coronary artery disease due to lipid rich plaque; S/P CABG x 1      donepezil (ARICEPT) 5 MG tablet Take 1 tablet (5 mg total) by mouth once daily.  Qty: 90 tablet, Refills: 3    Associated Diagnoses: Prescription refill      doxazosin (CARDURA) 2 MG tablet Take 1 tablet (2 mg total) by mouth every evening.  Qty: 90 tablet, Refills: 3      furosemide (LASIX) 40 MG tablet Take 1 tablet (40 mg total) by mouth 2 (two) times daily.  Qty: 180 tablet, Refills: 3    Associated Diagnoses: Prescription refill      hydrALAZINE (APRESOLINE) 50 MG tablet Take 1 tablet (50 mg total) by mouth every 12 (twelve) hours.  Qty: 180 tablet, Refills: 3    Associated Diagnoses: Prescription refill         STOP taking these medications       bicalutamide (CASODEX) 50 MG Tab Comments:   Reason for Stopping:         simvastatin (ZOCOR) 40 MG tablet Comments:   Reason for Stopping:               I certify that this patient is confined to his home and needs intermittent skilled nursing care, physical therapy and occupational therapy.

## 2017-02-07 NOTE — ASSESSMENT & PLAN NOTE
Hoeld allopurinol given liver injury. Will resume at 100mg per day rather than 300mg given age and CKD.

## 2017-02-07 NOTE — ASSESSMENT & PLAN NOTE
- CXR shows mild right pleural effusion, no bilateral opacities  - BNP elevated to 2656 (baseline 1158)  - Received IV lasix 80 mg in ED  - Strict ins/out  - resumed lasix 02/06/2017

## 2017-02-07 NOTE — PLAN OF CARE
Pt unable to get IMM signed prior to discharge with no family at bedside and pt unable to understand form.  Floor Rn notified.

## 2017-02-07 NOTE — ASSESSMENT & PLAN NOTE
- Likely TIA given complete resolution of symptoms vs hepatic encephalopathy   - CT neg for acute stroke or hemorrhage (chronic microvascular changes)  - UA neg for infection  - CXR neg for consolidation or patchy opacities  - s/p 1 dose of aspirin 325 mg   - continue aspirin 81 mg  - continue lactulose to cover for hepatic encephalopathy

## 2017-02-07 NOTE — ASSESSMENT & PLAN NOTE
- Could be due to hepatic congestion given history of heart failure  - More likely cause is polypharmacy as patient is taking medications that could contribute to liver damage. Will hold these medications (allupurinol, statin, bicalutamide)  - Ordered acute hep panel- negative  - Acetomenopin, EtOH levels wnl  - Liver US shows no evidence of parenchymal abnormality or thrombosis of portal/hepatic veins   - per Hepatology, this is probably a pattern of ischemia, if LFTs don't improve, liver biopsy would be considered.  - improving, will have outpatient labs check to monitor  - hold casodex until can follow up with outpatient urology. Hold simvastatin until priority clinic follow up. Decrease allopurinol to 100mg daily

## 2017-02-07 NOTE — PLAN OF CARE
Problem: Diabetes, Type 2 (Adult)  Intervention: Optimize Glycemic Control  Glucose this am per lab = 86mg/dl. No s/s hypogylcemia noted.

## 2017-02-07 NOTE — PROGRESS NOTES
[facility] Hospital Medicine  Progress Note    Patient Name: Orlando Tran Jr.  MRN: [id number]  Patient Class: IP- Inpatient   Admission Date: 2/4/2017  Length of Stay: 2 days  Attending Physician: Yuridia Lee MD  Primary Care Provider: Jordon Spence MD    [facility] Medicine Team: Networked reference to record PCT  Elisabeth Allison MD    Subjective:     Principal Problem:Acute liver failure without hepatic coma    HPI:  89 yo male with PMH of chronic diastolic HF, CAD s/p CABG x 1, HTN, prostate adenocarcinoma, HLD, CKD-III, venous insufficiency who presents to [facility] for acute encephalopathy and bilateral leg weakness. Patient was is his normal state of health until this afternoon when patient's son saw patient sitting in the car staring at him blankly and not as responsive as per usual. The son attempted to help him exit the car, and the patient was unable to hold himself up due to lower extremity weakness. Patient's son denies loss of consciousness, urinary/bowel incontinence, or convulsion. When EMS arrived they noted expressive aphasia but facial droop or drift. Patient's son notes that patient has returned to his baseline in the ED. Son also mentions that patient has been a little more short of breath with exertion, but denies orthopnea, increased LE edema. Patient has had a good appetite lately but does not avoid salty foods.    Hospital Course:  CT head in ED was negative for acute hemorrhages or infarctions and no masses. CXR showed mild cardiomegaly.small right effusion. He was given IV lasix 80 mg in ED. Liver enzymes were elevated on admission so liver injury work-up was initiated. All meds that could cause liver injury were discontinued.        Interval History: Unable to convey if any issues are bothering him secondary to current mental status or hearing issues.  Per hepatology, if LFTs do not improve further, a liver biopsy may be considered.      Review of Systems   Unable to  "perform ROS: Mental status change     Objective:     Vital Signs (Most Recent):  Temp: 97.5 °F (36.4 °C) (02/06/17 1649)  Pulse: (!) 56 (02/06/17 1649)  Resp: 16 (02/06/17 1649)  BP: (!) 127/59 (02/06/17 1649)  SpO2: 100 % (02/06/17 1649) Vital Signs (24h Range):  Temp:  [97.3 °F (36.3 °C)-97.6 °F (36.4 °C)] 97.5 °F (36.4 °C)  Pulse:  [53-63] 56  Resp:  [16-18] 16  SpO2:  [99 %-100 %] 100 %  BP: (127-164)/(58-63) 127/59     Weight: 78 kg (172 lb)  Body mass index is 27.76 kg/(m^2).  No intake or output data in the 24 hours ending 02/06/17 1917   Physical Exam   Constitutional: He appears well-developed and well-nourished.   HENT:   Head: Normocephalic and atraumatic.   Right Ear: External ear normal.   Left Ear: External ear normal.   Eyes: EOM are normal.   Neck: Normal range of motion. Neck supple.   Cardiovascular: Normal rate, regular rhythm, normal heart sounds and intact distal pulses.    Pulmonary/Chest: Effort normal. He has no wheezes. He exhibits no tenderness.   Abdominal: Soft. Bowel sounds are normal.   Musculoskeletal:   1+ bilateral LE pitting edema   Neurological: He is alert. No cranial nerve deficit.   4/5 strength UE and LE  No drift, no facial droop    When asked about any complaints, responds with "i can't understand what you're saying"   Skin: Skin is warm and dry.   Psychiatric: He has a normal mood and affect. His behavior is normal.       Significant Labs: All pertinent labs within the past 24 hours have been reviewed.    Significant Imaging: I have reviewed all pertinent imaging results/findings within the past 24 hours.    Assessment/Plan:      * Acute liver failure without hepatic coma  - Could be due to hepatic congestion given history of heart failure  - More likely cause is polypharmacy as patient is taking medications that could contribute to liver damage. Will hold these medications (allupurinol, statin, bicalutamide)  - Ordered acute hep panel. Pending.   - Acetomenopin, EtOH levels " wnl  - Liver US shows no evidence of parenchymal abnormality or thrombosis of portal/hepatic veins   - per Hepatology, this is probably a pattern of ischemia, if LFTs don't improve, liver biopsy would be considered.      Combined hyperlipidemia associated with type 2 diabetes mellitus  - hold simvastatin   - Hgb 5.4 in 1/2017      Hypertension associated with diabetes  - continue coreg  - hold hydralazine   - lasix resumed 02/06/2017        CKD (chronic kidney disease), stage III  - Cr (baseline 1.5) improved  - May be due to poor po intake prior to admission   - s/p 250 cc NS bolus   - urine electrolytes were previously ordered    Prostate cancer  - history of prostate adenocarcinoma  - PSA 6/2016 200s. Repeat PSA in 300s  - continue doxazosin  - holding bicalutamide as it may be contributing to to liver injury        Dementia without behavioral disturbance  - on donepezil 5 mg      Chronic gout without tophus  Hold allopurinol given liver injury      Acute on chronic diastolic heart failure  - CXR shows mild right pleural effusion, no bilateral opacities  - BNP elevated to 2656 (baseline 1158)  - Received IV lasix 80 mg in ED  - Strict ins/out  - resumed lasix 02/06/2017        Coronary artery disease involving native coronary artery without angina pectoris  - s/p CABGx1, stents to LCF&RCF (2011)  - continue aspirin. Hold simvastatin given liver injury       Acute encephalopathy  - Likely TIA given complete resolution of symptoms vs hepatic encephalopathy   - CT neg for acute stroke or hemorrhage (chronic microvascular changes)  - UA neg for infection  - CXR neg for consolidation or patchy opacities  - s/p 1 dose of aspirin 325 mg   - continue aspirin 81 mg  - continue lactulose to cover for hepatic encephalopathy       VTE Risk Mitigation         Ordered     heparin (porcine) injection 5,000 Units  Every 12 hours     Route:  Subcutaneous        02/05/17 1222     Low Risk of VTE  Once      02/04/17 8492           Elisabeth Allison MD  Department of Hospital Medicine   [facility]

## 2017-02-07 NOTE — ASSESSMENT & PLAN NOTE
- CXR shows mild right pleural effusion  - BNP elevated to 2656 (baseline 1158)  - Received IV lasix 80 mg in ED  - Strict ins/out  - resumed lasix 02/07/2017

## 2017-02-08 ENCOUNTER — OFFICE VISIT (OUTPATIENT)
Dept: UROLOGY | Facility: CLINIC | Age: 82
End: 2017-02-08
Payer: MEDICARE

## 2017-02-08 ENCOUNTER — OFFICE VISIT (OUTPATIENT)
Dept: CARDIOLOGY | Facility: CLINIC | Age: 82
End: 2017-02-08
Payer: MEDICARE

## 2017-02-08 VITALS
WEIGHT: 162.06 LBS | HEART RATE: 64 BPM | BODY MASS INDEX: 26.05 KG/M2 | SYSTOLIC BLOOD PRESSURE: 144 MMHG | DIASTOLIC BLOOD PRESSURE: 57 MMHG | HEIGHT: 66 IN

## 2017-02-08 VITALS
SYSTOLIC BLOOD PRESSURE: 140 MMHG | WEIGHT: 164 LBS | HEIGHT: 66 IN | DIASTOLIC BLOOD PRESSURE: 64 MMHG | BODY MASS INDEX: 26.36 KG/M2 | HEART RATE: 59 BPM

## 2017-02-08 DIAGNOSIS — I25.10 CORONARY ARTERY DISEASE INVOLVING NATIVE CORONARY ARTERY OF NATIVE HEART WITHOUT ANGINA PECTORIS: ICD-10-CM

## 2017-02-08 DIAGNOSIS — E11.59 HYPERTENSION ASSOCIATED WITH DIABETES: Chronic | ICD-10-CM

## 2017-02-08 DIAGNOSIS — I15.2 HYPERTENSION ASSOCIATED WITH DIABETES: Chronic | ICD-10-CM

## 2017-02-08 DIAGNOSIS — I77.9 BILATERAL CAROTID ARTERY DISEASE: ICD-10-CM

## 2017-02-08 DIAGNOSIS — Z95.1 S/P CABG X 1: ICD-10-CM

## 2017-02-08 DIAGNOSIS — N18.30 CKD (CHRONIC KIDNEY DISEASE), STAGE III: ICD-10-CM

## 2017-02-08 DIAGNOSIS — E78.2 COMBINED HYPERLIPIDEMIA ASSOCIATED WITH TYPE 2 DIABETES MELLITUS: ICD-10-CM

## 2017-02-08 DIAGNOSIS — I50.32 CHRONIC DIASTOLIC CHF (CONGESTIVE HEART FAILURE): Primary | ICD-10-CM

## 2017-02-08 DIAGNOSIS — C61 CA PROSTATE, ADENOCA: Primary | ICD-10-CM

## 2017-02-08 DIAGNOSIS — E11.69 COMBINED HYPERLIPIDEMIA ASSOCIATED WITH TYPE 2 DIABETES MELLITUS: ICD-10-CM

## 2017-02-08 DIAGNOSIS — I27.9 PULMONARY HEART DISEASE: ICD-10-CM

## 2017-02-08 DIAGNOSIS — I87.2 VENOUS INSUFFICIENCY OF BOTH LOWER EXTREMITIES: ICD-10-CM

## 2017-02-08 PROBLEM — G93.40 ACUTE ENCEPHALOPATHY: Status: RESOLVED | Noted: 2017-02-04 | Resolved: 2017-02-08

## 2017-02-08 PROBLEM — K72.00 ACUTE LIVER FAILURE WITHOUT HEPATIC COMA: Status: RESOLVED | Noted: 2017-02-04 | Resolved: 2017-02-08

## 2017-02-08 PROCEDURE — 1160F RVW MEDS BY RX/DR IN RCRD: CPT | Mod: S$GLB,,, | Performed by: UROLOGY

## 2017-02-08 PROCEDURE — 1126F AMNT PAIN NOTED NONE PRSNT: CPT | Mod: S$GLB,,, | Performed by: INTERNAL MEDICINE

## 2017-02-08 PROCEDURE — 1159F MED LIST DOCD IN RCRD: CPT | Mod: S$GLB,,, | Performed by: INTERNAL MEDICINE

## 2017-02-08 PROCEDURE — 99499 UNLISTED E&M SERVICE: CPT | Mod: S$GLB,,, | Performed by: INTERNAL MEDICINE

## 2017-02-08 PROCEDURE — 1157F ADVNC CARE PLAN IN RCRD: CPT | Mod: S$GLB,,, | Performed by: UROLOGY

## 2017-02-08 PROCEDURE — 99213 OFFICE O/P EST LOW 20 MIN: CPT | Mod: S$GLB,,, | Performed by: UROLOGY

## 2017-02-08 PROCEDURE — 99999 PR PBB SHADOW E&M-EST. PATIENT-LVL III: CPT | Mod: PBBFAC,,, | Performed by: INTERNAL MEDICINE

## 2017-02-08 PROCEDURE — 99999 PR PBB SHADOW E&M-EST. PATIENT-LVL III: CPT | Mod: PBBFAC,,, | Performed by: UROLOGY

## 2017-02-08 PROCEDURE — 1126F AMNT PAIN NOTED NONE PRSNT: CPT | Mod: S$GLB,,, | Performed by: UROLOGY

## 2017-02-08 PROCEDURE — 99214 OFFICE O/P EST MOD 30 MIN: CPT | Mod: S$GLB,,, | Performed by: INTERNAL MEDICINE

## 2017-02-08 PROCEDURE — 1160F RVW MEDS BY RX/DR IN RCRD: CPT | Mod: S$GLB,,, | Performed by: INTERNAL MEDICINE

## 2017-02-08 PROCEDURE — 1157F ADVNC CARE PLAN IN RCRD: CPT | Mod: S$GLB,,, | Performed by: INTERNAL MEDICINE

## 2017-02-08 PROCEDURE — 1159F MED LIST DOCD IN RCRD: CPT | Mod: S$GLB,,, | Performed by: UROLOGY

## 2017-02-08 NOTE — PROGRESS NOTES
"Subjective:   Patient ID:  Orlando Tran Jr. is a 89 y.o. male who presents for follow-up of Coronary Artery Disease and Chronic diastolic heart failure      HPI:   Pt is a 89 y/o man typically followed by Dr. Hernandez here s/p hospitalization for hepatic encephalopathy of uncertain etiology and decompensates diastolic CHF.  He has a h/o CAD s/p CABG, HTN, HPL, DM and chronic diastolic CHF.  During the hospitalization hepatotoxic meds were held and patient's MS returned to baseline.   His BNP was elevated above baseline at ~ 2700 (baseline ~ 1110) and he was diuresed.  ECHO with LVEF 40-45% with concentric LVH and PAP ~ 55mmHg.  ECHO from 2013 with preserved LVEF and PCP ~65mmHg.   Activity most days is sitting in a chair and watching TV.  Accompanied by his son today who assists with ADLs and meds.       Vitals:    02/08/17 1110   BP: (!) 140/64   BP Location: Left arm   Patient Position: Sitting   BP Method: Automatic   Pulse: (!) 59   Weight: 74.4 kg (164 lb 0.4 oz)   Height: 5' 6" (1.676 m)     Body mass index is 26.47 kg/(m^2).  Estimated Creatinine Clearance: 25 mL/min (based on Cr of 1.7).    Lab Results   Component Value Date     02/07/2017    K 3.5 02/07/2017     02/07/2017    CO2 23 02/07/2017    BUN 25 02/07/2017    CREATININE 1.7 (H) 02/07/2017    GLU 86 02/07/2017    HGBA1C 5.7 01/13/2017    MG 1.8 02/04/2017     (H) 02/07/2017     (H) 02/07/2017    ALBUMIN 2.9 (L) 02/07/2017    PROT 6.7 02/07/2017    BILITOT 1.5 (H) 02/07/2017    WBC 4.32 02/07/2017    HGB 12.0 (L) 02/07/2017    HCT 37.5 (L) 02/07/2017    MCV 90 02/07/2017     02/07/2017    INR 1.3 (H) 02/07/2017    .74 (H) 10/21/2011    TSH 1.477 01/14/2015    CHOL 112 (L) 01/13/2017    HDL 43 01/13/2017    LDLCALC 53.0 (L) 01/13/2017    TRIG 80 01/13/2017       Current Outpatient Prescriptions   Medication Sig    allopurinol (ZYLOPRIM) 100 MG tablet Take 1 tablet (100 mg total) by mouth Daily.    aspirin 81 " mg Tab Take 1 tablet by mouth once daily.     carvedilol (COREG) 12.5 MG tablet Take 1 tablet (12.5 mg total) by mouth 2 (two) times daily with meals.    donepezil (ARICEPT) 5 MG tablet Take 1 tablet (5 mg total) by mouth once daily.    doxazosin (CARDURA) 2 MG tablet Take 1 tablet (2 mg total) by mouth every evening.    furosemide (LASIX) 40 MG tablet Take 1 tablet (40 mg total) by mouth 2 (two) times daily.    hydrALAZINE (APRESOLINE) 50 MG tablet Take 1 tablet (50 mg total) by mouth every 12 (twelve) hours.     No current facility-administered medications for this visit.        Review of Systems   Constitution: Positive for malaise/fatigue. Negative for decreased appetite, weakness, weight gain and weight loss.   HENT: Negative for headaches.    Eyes: Negative for visual disturbance.   Cardiovascular: Positive for dyspnea on exertion and leg swelling. Negative for chest pain, claudication, irregular heartbeat, orthopnea, palpitations, paroxysmal nocturnal dyspnea and syncope.   Respiratory: Negative for cough, shortness of breath and snoring.    Skin: Negative for rash.   Musculoskeletal: Positive for arthritis. Negative for muscle cramps, muscle weakness and myalgias.   Gastrointestinal: Negative for abdominal pain, anorexia, change in bowel habit and nausea.   Genitourinary: Negative for dysuria and frequency.   Neurological: Negative for excessive daytime sleepiness, dizziness, loss of balance and numbness.   Psychiatric/Behavioral: Negative for depression.       Objective:   Physical Exam   Constitutional: He is oriented to person, place, and time. He appears well-developed and well-nourished.   HENT:   Head: Normocephalic and atraumatic.   Eyes: Pupils are equal, round, and reactive to light.   Neck: Normal range of motion. Neck supple.   Cardiovascular: Normal rate, regular rhythm, normal heart sounds, intact distal pulses and normal pulses.  Exam reveals no gallop.    No murmur  heard.  Pulmonary/Chest: Effort normal and breath sounds normal.   Abdominal: Soft. Bowel sounds are normal. There is no hepatosplenomegaly. There is no tenderness.   Musculoskeletal: Normal range of motion.        Right lower leg: He exhibits edema.        Left lower leg: He exhibits edema.   Neurological: He is alert and oriented to person, place, and time.   Skin: Skin is warm and dry.   Psychiatric: He has a normal mood and affect. His speech is normal and behavior is normal. Judgment and thought content normal.       Assessment:     1. Coronary artery disease involving native coronary artery of native heart without angina pectoris    2. Hypertension associated with diabetes    3. Combined hyperlipidemia associated with type 2 diabetes mellitus    4. Bilateral carotid artery disease    5. S/P CABG x 1 - [facility] many yrs ago    6. CKD (chronic kidney disease), stage III    7. Chronic diastolic CHF (congestive heart failure)    8. Pulmonary heart disease    9. Venous insufficiency of both lower extremities        Plan:   Patient is clinically euvolemic and comfortable.  Continue current meds and titrate lasix PRN to maintain weight between 160-165lbs.  Pt and son understand recs.

## 2017-02-08 NOTE — PROGRESS NOTES
Subjective:       Patient ID: Orlando Tran Jr. is a 89 y.o. male.    Chief Complaint: Pro[facility]    HPI patient is a long history of prostate cancer.  His PSA is now 300 range.  He was on LHRH agonist and has not had this in a while.  He was also taking Bhargavi attacks but was recently admitted for confusion and this was stopped a long discussion with his son.  Patient is not having any back pain bone pain voiding difficulty.  He does get somewhat confused we discussed the pros and cons of further treatment    Past Medical History   Diagnosis Date    Cancer     CKD (chronic kidney disease), stage III     Coronary artery disease      s/p stents to LCF & RCA - patent     Edema of both legs     Gout, chronic     Hyperlipidemia      not currently on statin    Hypertension     Prostate cancer     S/P CABG x 1 01     3 v incl LIMA to LAD       Past Surgical History   Procedure Laterality Date    Coronary artery bypass graft  01    Coronary stent placement         Family History   Problem Relation Age of Onset    Heart disease Neg Hx     Heart attack Neg Hx     Hypertension Neg Hx        Social History     Social History    Marital status:      Spouse name: N/A    Number of children: N/A    Years of education: N/A     Occupational History    Not on file.     Social History Main Topics    Smoking status: Former Smoker     Quit date: 1989    Smokeless tobacco: Not on file    Alcohol use Yes      Comment: / of vodka every 10 days    Drug use: Not on file    Sexual activity: Not on file     Other Topics Concern    Not on file     Social History Narrative    2015: Wife  2015 from cancer.    Retired .       Allergies:  No known drug allergies    Medications:    Current Outpatient Prescriptions:     allopurinol (ZYLOPRIM) 100 MG tablet, Take 1 tablet (100 mg total) by mouth Daily., Disp: 30  tablet, Rfl: 1    aspirin 81 mg Tab, Take 1 tablet by mouth once daily. , Disp: , Rfl:     carvedilol (COREG) 12.5 MG tablet, Take 1 tablet (12.5 mg total) by mouth 2 (two) times daily with meals., Disp: 180 tablet, Rfl: 3    donepezil (ARICEPT) 5 MG tablet, Take 1 tablet (5 mg total) by mouth once daily., Disp: 90 tablet, Rfl: 3    doxazosin (CARDURA) 2 MG tablet, Take 1 tablet (2 mg total) by mouth every evening., Disp: 90 tablet, Rfl: 3    furosemide (LASIX) 40 MG tablet, Take 1 tablet (40 mg total) by mouth 2 (two) times daily., Disp: 180 tablet, Rfl: 3    hydrALAZINE (APRESOLINE) 50 MG tablet, Take 1 tablet (50 mg total) by mouth every 12 (twelve) hours., Disp: 180 tablet, Rfl: 3  No current facility-administered medications for this visit.     Review of Systems   Constitutional: Negative for activity change, appetite change, chills, diaphoresis, fatigue, fever and unexpected weight change.   HENT: Negative for congestion, dental problem, hearing loss, mouth sores, postnasal drip, rhinorrhea, sinus pressure and trouble swallowing.    Eyes: Negative for pain, discharge and itching.   Respiratory: Negative for apnea, cough, choking, chest tightness, shortness of breath and wheezing.    Cardiovascular: Negative for chest pain, palpitations and leg swelling.   Gastrointestinal: Negative for abdominal distention, abdominal pain, anal bleeding, blood in stool, constipation, diarrhea, nausea, rectal pain and vomiting.   Endocrine: Negative for polydipsia and polyuria.   Genitourinary: Negative for decreased urine volume, difficulty urinating, discharge, dysuria, enuresis, flank pain, frequency, genital sores, hematuria, penile pain, penile swelling, scrotal swelling, testicular pain and urgency.   Musculoskeletal: Negative for arthralgias, back pain and myalgias.   Skin: Negative for color change, rash and wound.   Neurological: Negative for dizziness, syncope, speech difficulty, light-headedness and headaches.    Hematological: Negative for adenopathy. Does not bruise/bleed easily.   Psychiatric/Behavioral: Negative for behavioral problems, confusion, hallucinations and sleep disturbance.       Objective:      Physical Exam   Constitutional: He appears well-developed.   HENT:   Head: Normocephalic.   Cardiovascular: Normal rate.    Pulmonary/Chest: Effort normal.   Abdominal: Soft.   Genitourinary:   Genitourinary Comments: Prostate is somewhat firm I really feel any nodules etc. rather small   Neurological: He is alert.   Skin: Skin is warm.     Psychiatric: He has a normal mood and affect.       Assessment:       1. CA prostate, adenoca        Plan:       Orlando was seen today for prostate cancer.    Diagnoses and all orders for this visit:    CA prostate, adenoca  -     NM Bone Scan Whole Body; Future  -     Ambulatory consult to Hematology / Oncology        will get a bone scan and an opinion from [facility].

## 2017-02-08 NOTE — MR AVS SNAPSHOT
[facility] - Cardiology  [address] [address]  Phone: [phone]                  Orlando Tran Jr.   2017 11:30 AM   Office Visit    Description:  Male : 1924   Provider:  Corby Pires MD   Department:  [facility] - [facility]           Reason for Visit     Coronary Artery Disease     Chronic diastolic heart failure           Diagnoses this Visit        Comments    Chronic diastolic CHF (congestive heart failure)    -  Primary     Hypertension associated with diabetes         Combined hyperlipidemia associated with type 2 diabetes mellitus         Coronary artery disease involving native coronary artery of native heart without angina pectoris         Bilateral carotid artery disease         S/P CABG x 1         CKD (chronic kidney disease), stage III         Pulmonary heart disease         Venous insufficiency of both lower extremities                To Do List           Future Appointments        Provider Department Dept Phone    2017 1:30 PM Sachin Finley Jr., MD [facility] Urology 4th Floor [phone]    2017 11:00 AM Jyoti Shearer [facility] [phone]    2/15/2017 10:00 AM PHYSICIAN, PRIORITY CLINIC [facility] [phone]    3/8/2017 10:00 AM LAB, SAME DAY [facility] [phone]    3/8/2017 10:15 AM SPECIMEN, MAIN CAMPUS [facility] [phone]      Goals (5 Years of Data)     None      [facility]     [facility] Care Line -  Assistance  Registered nurses in the [facility] provide clinical advisement, health education, appointment booking, and other advisory services.  Call for this free service at [phone].             Medications           Message regarding Medications     Verify the changes and/or additions to your medication regime listed below are the same as discussed with your clinician today.  If any of these changes  "or additions are incorrect, please notify your healthcare provider.             Verify that the below list of medications is an accurate representation of the medications you are currently taking.  If none reported, the list may be blank. If incorrect, please contact your healthcare provider. Carry this list with you in case of emergency.           Current Medications     allopurinol (ZYLOPRIM) 100 MG tablet Take 1 tablet (100 mg total) by mouth Daily.    aspirin 81 mg Tab Take 1 tablet by mouth once daily.     carvedilol (COREG) 12.5 MG tablet Take 1 tablet (12.5 mg total) by mouth 2 (two) times daily with meals.    donepezil (ARICEPT) 5 MG tablet Take 1 tablet (5 mg total) by mouth once daily.    doxazosin (CARDURA) 2 MG tablet Take 1 tablet (2 mg total) by mouth every evening.    furosemide (LASIX) 40 MG tablet Take 1 tablet (40 mg total) by mouth 2 (two) times daily.    hydrALAZINE (APRESOLINE) 50 MG tablet Take 1 tablet (50 mg total) by mouth every 12 (twelve) hours.           Clinical Reference Information           Your Vitals Were     BP Pulse Height Weight BMI    140/64 (BP Location: Left arm, Patient Position: Sitting, BP Method: Automatic) 59 5' 6" (1.676 m) 74.4 kg (164 lb 0.4 oz) 26.47 kg/m2      Blood Pressure          Most Recent Value    Right Arm BP - Sitting  145/67    Left Arm BP - Sitting  140/64    BP  (!)  140/64      Allergies as of 2/8/2017     No Known Drug Allergies      Immunizations Administered on Date of Encounter - 2/8/2017     None      [facility] Sign-Up     Activating your [facility] account is as easy as 1-2-3!     1) Visit [email or web address], select Sign Up Now, enter this activation code and your date of birth, then select Next.  [email or web address]  Expires: 2/27/2017 12:35 PM      2) Create a username and password to use when you visit [facility] in the future and select a security question in case you lose your password and select Next.    3) Enter your e-mail address and click Sign " Up!    Additional Information  If you have questions, please e-mail [email or web address] or call [phone] to talk to our [facility] staff. Remember, [facility] is NOT to be used for urgent needs. For medical emergencies, dial 911.         Language Assistance Services     ATTENTION: Language assistance services are available, free of charge. Please call [phone].      ATENCIÓN: Si habla español, tiene a marcelo disposición servicios gratuitos de asistencia lingüística. Llame al [phone].     [facility] Ý: N?u b?n nói Ti?ng Vi?t, có các d?ch v? h? tr? ngôn ng? mi?n phí dành cho b?n. G?i s? [phone].         [facility] complies with applicable Federal civil rights laws and does not discriminate on the basis of race, color, national origin, age, disability, or sex.

## 2017-02-08 NOTE — MR AVS SNAPSHOT
[facility] 4th Floor  [address]  [facility] [address]  Phone: [phone]                  Orlando Marc Prasad   2017 1:30 PM   Office Visit    Description:  Male : 1924   Provider:  Sachin Finley Jr., MD   Department:  [facility] 4th Floor           Reason for Visit     Prostate Cancer           Diagnoses this Visit        Comments    CA prostate, adenoca    -  Primary            To Do List           Future Appointments        Provider Department Dept Phone    2017 11:00 AM Jyoti Shearer, [facility] [phone]    2/15/2017 10:00 AM PHYSICIAN, [facility] CLINIC [facility] [phone]    3/8/2017 10:00 AM LAB, SAME DAY [facility] [address] [phone]    3/8/2017 10:15 AM SPECIMEN, MAIN CAMPUS [facility] [address] [phone]    3/13/2017 11:20 AM Gregg Self MD [facility] [phone]      Goals (5 Years of Data)     None      [facility]     [facility] Nurse Care Line -  Assistance  Registered nurses in the [facility] provide clinical advisement, health education, appointment booking, and other advisory services.  Call for this free service at [phone].             Medications           Message regarding Medications     Verify the changes and/or additions to your medication regime listed below are the same as discussed with your clinician today.  If any of these changes or additions are incorrect, please notify your healthcare provider.             Verify that the below list of medications is an accurate representation of the medications you are currently taking.  If none reported, the list may be blank. If incorrect, please contact your healthcare provider. Carry this list with you in case of emergency.           Current Medications     allopurinol (ZYLOPRIM) 100 MG tablet Take 1 tablet (100 mg total) by mouth Daily.    aspirin 81 mg Tab Take  "1 tablet by mouth once daily.     carvedilol (COREG) 12.5 MG tablet Take 1 tablet (12.5 mg total) by mouth 2 (two) times daily with meals.    donepezil (ARICEPT) 5 MG tablet Take 1 tablet (5 mg total) by mouth once daily.    doxazosin (CARDURA) 2 MG tablet Take 1 tablet (2 mg total) by mouth every evening.    furosemide (LASIX) 40 MG tablet Take 1 tablet (40 mg total) by mouth 2 (two) times daily.    hydrALAZINE (APRESOLINE) 50 MG tablet Take 1 tablet (50 mg total) by mouth every 12 (twelve) hours.           Clinical Reference Information           Your Vitals Were     BP Pulse Height Weight BMI    144/57 64 5' 6" (1.676 m) 73.5 kg (162 lb 0.6 oz) 26.15 kg/m2      Blood Pressure          Most Recent Value    BP  (!)  144/57      Allergies as of 2/8/2017     No Known Drug Allergies      Immunizations Administered on Date of Encounter - 2/8/2017     None      Orders Placed During Today's Visit      Normal Orders This Visit    Ambulatory consult to Hematology / Oncology     Future Labs/Procedures Expected by Expires    NM Bone Scan Whole Body  2/8/2017 2/9/2018      [facility] Sign-Up     Activating your [facility] account is as easy as 1-2-3!     1) Visit [email or web address], select Sign Up Now, enter this activation code and your date of birth, then select Next.  [email or web address]  Expires: 2/27/2017 12:35 PM      2) Create a username and password to use when you visit [facility] in the future and select a security question in case you lose your password and select Next.    3) Enter your e-mail address and click Sign Up!    Additional Information  If you have questions, please e-mail [email or web address] or call [phone] to talk to our [facility] staff. Remember, [facility] is NOT to be used for urgent needs. For medical emergencies, dial 911.         Language Assistance Services     ATTENTION: Language assistance services are available, free of charge. Please call [phone].      ATENCIÓN: kirk Vega " marcelo disposición servicios gratuitos de asistencia lingüística. [place] al [phone].     ROB Ý: N?u b?n nói Ti?ng Vi?t, có các d?ch v? h? tr? ngôn ng? mi?n phí dành cho b?n. G?i s? [phone].         [facility] 4th Floor complies with applicable Federal civil rights laws and does not discriminate on the basis of race, color, national origin, age, disability, or sex.

## 2017-02-08 NOTE — LETTER
February 8, 2017      Jordon Spence MD  [address]  [place] [address]           [facility] 4th Floor  [address]  [facility] [address]  Phone: [phone]          Patient: Orlando Tran Jr.   MR Number: [id number]   YOB: 1924   Date of Visit: 2/8/2017       Dear Dr. Jordon Spence:    Thank you for referring Orlando Tran to me for evaluation. Attached you will find relevant portions of my assessment and plan of care.    If you have questions, please do not hesitate to call me. I look forward to following Orlando Tran along with you.    Sincerely,    Sachin Finley Jr., MD    Enclosure  CC:  No Recipients    If you would like to receive this communication electronically, please contact [email or web address] or [phone] to request more information on [employer] access.    For providers and/or their staff who would like to refer a patient to [facility], please contact us through our one-stop-shop provider referral line, [facility], at [phone].    If you feel you have received this communication in error or would no longer like to receive these types of communications, please e-mail [email or web address]

## 2017-02-08 NOTE — LETTER
February 8, 2017      Jordon Spence MD  [address] [facility] [address]  [facility] [address]           [facility] - Cardiology  [address] [facility] [address]  [facility] [address]  Phone: [phone]          Patient: Orlando Tran Jr.   MR Number: [id number]   YOB: 1924   Date of Visit: 2/8/2017       Dear Dr. Jordon Spence:    Thank you for referring Orlando Tran to me for evaluation. Attached you will find relevant portions of my assessment and plan of care.    If you have questions, please do not hesitate to call me. I look forward to following Orlando Tran along with you.    Sincerely,    Corby Pires MD    Enclosure  CC:  No Recipients    If you would like to receive this communication electronically, please contact [email or web address] or [phone] to request more information on [employer] Link access.    For providers and/or their staff who would like to refer a patient to [facility], please contact us through our one-stop-shop provider referral line, [facility], at [phone].    If you feel you have received this communication in error or would no longer like to receive these types of communications, please e-mail [email or web address]

## 2017-02-09 ENCOUNTER — PATIENT OUTREACH (OUTPATIENT)
Dept: ADMINISTRATIVE | Facility: CLINIC | Age: 82
End: 2017-02-09
Payer: MEDICARE

## 2017-02-09 ENCOUNTER — TELEPHONE (OUTPATIENT)
Dept: UROLOGY | Facility: CLINIC | Age: 82
End: 2017-02-09

## 2017-02-09 NOTE — PATIENT INSTRUCTIONS

## 2017-02-10 ENCOUNTER — TELEPHONE (OUTPATIENT)
Dept: PRIMARY CARE CLINIC | Facility: CLINIC | Age: 82
End: 2017-02-10

## 2017-02-10 NOTE — TELEPHONE ENCOUNTER
----- Message from Dionne Hyman sent at 2/9/2017  6:21 PM CST -----  Contact: Pt's son Ferny  Pt is scheduled for an appt on 02/15 and requesting to reschedule.        Please call pt's son, Ferny, at [phone].        Thanks!

## 2017-02-12 ENCOUNTER — HOSPITAL ENCOUNTER (EMERGENCY)
Facility: HOSPITAL | Age: 82
Discharge: HOME | End: 2017-02-13
Attending: EMERGENCY MEDICINE
Payer: MEDICARE
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
## 2017-02-12 DIAGNOSIS — S01.81XA FACIAL LACERATION, INITIAL ENCOUNTER: ICD-10-CM
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
## 2017-02-12 DIAGNOSIS — S02.85XA ORBITAL WALL FRACTURE, CLOSED, INITIAL ENCOUNTER: Primary | ICD-10-CM

## 2017-02-12 PROCEDURE — 36000 PLACE NEEDLE IN VEIN: CPT

## 2017-02-12 PROCEDURE — 99284 EMERGENCY DEPT VISIT MOD MDM: CPT | Mod: 25,,, | Performed by: EMERGENCY MEDICINE

## 2017-02-12 PROCEDURE — 12013 RPR F/E/E/N/L/M 2.6-5.0 CM: CPT

## 2017-02-12 PROCEDURE — 25000003 PHARM REV CODE 250: Performed by: EMERGENCY MEDICINE

## 2017-02-12 PROCEDURE — 99285 EMERGENCY DEPT VISIT HI MDM: CPT | Mod: 25

## 2017-02-12 PROCEDURE — 12013 RPR F/E/E/N/L/M 2.6-5.0 CM: CPT | Mod: ,,, | Performed by: EMERGENCY MEDICINE

## 2017-02-12 RX ORDER — ACETAMINOPHEN 325 MG/1
650 TABLET ORAL
Status: COMPLETED | OUTPATIENT
Start: 2017-02-12 | End: 2017-02-12

## 2017-02-12 RX ORDER — LIDOCAINE HYDROCHLORIDE AND EPINEPHRINE 10; 10 MG/ML; UG/ML
10 INJECTION, SOLUTION INFILTRATION; PERINEURAL ONCE
Status: COMPLETED | OUTPATIENT
Start: 2017-02-12 | End: 2017-02-12

## 2017-02-12 RX ADMIN — ACETAMINOPHEN 650 MG: 325 TABLET, FILM COATED ORAL at 09:02

## 2017-02-12 RX ADMIN — LIDOCAINE HYDROCHLORIDE AND EPINEPHRINE 10 ML: 10; 10 INJECTION, SOLUTION INFILTRATION; PERINEURAL at 09:02

## 2017-02-12 NOTE — ED AVS SNAPSHOT
[facility]  [address]  [facility] [address]               Orlando Tran JrNoy   2017  7:18 PM   ED    Description:  Male : 1924   Department[facility]  [facility]           Your Care was Coordinated By:     Provider Role From To    Benji Sandhu MD Attending Provider 17 --      Reason for Visit     Fall           Diagnoses this Visit        Comments    Orbital wall fracture, closed, initial encounter    -  Primary     Facial laceration, initial encounter         Fall, initial encounter           ED Disposition     None           To Do List           Follow-up Information     Follow up with [facility]    Specialty:  Emergency Medicine    Why:  in 5 days for suture removal     Contact information:    [address]  [facility] [address] [phone]        Schedule an appointment as soon as possible for a visit with [facility].    Specialty:  Ophthalmology    Why:  for follow up in the next 1-3 days    Contact information:    [address]  [facility] [address] [phone]    Additional information:    10th Floor    Dr. Guthrie patients please go to the 1st Floor [facility] for your appointment.         Follow up with [facility] Otorhinol[facility]ology.    Specialty:  Otolaryngology    Why:  for follow up in 2-3 weeks orbital fractures    Contact information:    [address]  [facility] [address] [phone]    Additional information:    [facility] - 4th Floor       These Medications        Disp Refills Start End    amoxicillin-clavulanate 875-125mg (AUGMENTIN) 875-125 mg per tablet 14 tablet 0 2017    Take 1 tablet by mouth 2 (two) times daily. - Oral    Pharmacy: [facility] [place], [place] - [address] [facility] [facility] Ph #: [phone]       pseudoephedrine (SUDAFED) 30 MG tablet 30 tablet 0 2017    Take 1  tablet (30 mg total) by mouth every 6 (six) hours as needed for Congestion. - Oral    Pharmacy: [address] #: [phone]         PURCHASE these Medications (No prescription required)        Start End    sodium chloride (SALINE NASAL) 0.65 % nasal spray 2017     Sig - Route: 1 spray by Nasal route as needed for Congestion. - Nasal    Class: OTC    oxymetazoline (AFRIN, OXYMETAZOLINE,) 0.05 % nasal spray 2017    Sig - Route: 2 sprays by Nasal route 2 (two) times daily. Take for only three days - Nasal    Class: OTC      [facility]     [facility] On Call Nurse Care Line -  Assistance  Registered nurses in the [facility] On Call Center provide clinical advisement, health education, appointment booking, and other advisory services.  Call for this free service at [phone].             Medications           Message regarding Medications     Verify the changes and/or additions to your medication regime listed below are the same as discussed with your clinician today.  If any of these changes or additions are incorrect, please notify your healthcare provider.        START taking these NEW medications        Refills    amoxicillin-clavulanate 875-125mg (AUGMENTIN) 875-125 mg per tablet 0    Sig: Take 1 tablet by mouth 2 (two) times daily.    Class: Print    Route: Oral    sodium chloride (SALINE NASAL) 0.65 % nasal spray 12    Si spray by Nasal route as needed for Congestion.    Class: OTC    Route: Nasal    oxymetazoline (AFRIN, OXYMETAZOLINE,) 0.05 % nasal spray 0    Si sprays by Nasal route 2 (two) times daily. Take for only three days    Class: OTC    Route: Nasal    pseudoephedrine (SUDAFED) 30 MG tablet 0    Sig: Take 1 tablet (30 mg total) by mouth every 6 (six) hours as needed for Congestion.    Class: Print    Route: Oral      These medications were administered today        Dose Freq    lidocaine-EPINEPHrine 1%-1:100,000 injection 10  mL 10 mL Once    Sig: Inject 10 mLs into the skin once.    Class: Normal    Route: Intradermal    acetaminophen tablet 650 mg 650 mg ED 1 Time    Sig: Take 2 tablets (650 mg total) by mouth ED 1 Time.    Class: Normal    Route: Oral    neomycin-bacitracin-polymyxin ointment 15 g 1 Tube ED 1 Time    Sig: Apply 15 g topically ED 1 Time.    Class: Normal    Route: Topical           Verify that the below list of medications is an accurate representation of the medications you are currently taking.  If none reported, the list may be blank. If incorrect, please contact your healthcare provider. Carry this list with you in case of emergency.           Current Medications     allopurinol (ZYLOPRIM) 100 MG tablet Take 1 tablet (100 mg total) by mouth Daily.    amoxicillin-clavulanate 875-125mg (AUGMENTIN) 875-125 mg per tablet Take 1 tablet by mouth 2 (two) times daily.    aspirin 81 mg Tab Take 1 tablet by mouth once daily.     carvedilol (COREG) 12.5 MG tablet Take 1 tablet (12.5 mg total) by mouth 2 (two) times daily with meals.    donepezil (ARICEPT) 5 MG tablet Take 1 tablet (5 mg total) by mouth once daily.    doxazosin (CARDURA) 2 MG tablet Take 1 tablet (2 mg total) by mouth every evening.    furosemide (LASIX) 40 MG tablet Take 1 tablet (40 mg total) by mouth 2 (two) times daily.    hydrALAZINE (APRESOLINE) 50 MG tablet Take 1 tablet (50 mg total) by mouth every 12 (twelve) hours.    lidocaine-EPINEPHrine 1%-1:100,000 injection 10 mL Inject 10 mLs into the skin once.    neomycin-bacitracin-polymyxin ointment 15 g Apply 15 g topically ED 1 Time.    oxymetazoline (AFRIN, OXYMETAZOLINE,) 0.05 % nasal spray 2 sprays by Nasal route 2 (two) times daily. Take for only three days    pseudoephedrine (SUDAFED) 30 MG tablet Take 1 tablet (30 mg total) by mouth every 6 (six) hours as needed for Congestion.    sodium chloride (SALINE NASAL) 0.65 % nasal spray 1 spray by Nasal route as needed for Congestion.           Clinical  "Reference Information           Your Vitals Were     BP Pulse Temp Resp Height Weight    123/58 77 98.1 °F (36.7 °C) (Oral) 16 5' 6" (1.676 m) 73.5 kg (162 lb)    SpO2 BMI             98% 26.15 kg/m2         Allergies as of 2/13/2017        Reactions    No Known Drug Allergies       Immunizations Administered on Date of Encounter - 2/13/2017     None      ED Micro, Lab, POCT     None      ED Imaging Orders     Start Ordered       Status Ordering Provider    02/12/17 1952 02/12/17 1952  CT Head Without Contrast  1 time imaging      Final result     02/12/17 1952 02/12/17 1952  CT Maxillofacial Without Contrast  1 time imaging      Final result         Discharge Instructions       Sinus precautions:   Take Augmentin 875-125mg twice daily for one week  Use Afrin nasal spray twice daily for three days only  Use Ocean Spray and/or Sudafed for congestion  No nose blowing  Sneeze with mouth open    Facial Fracture    You have a broken bone, or fracture, in your face. This may be a small crack in the bone. Or it may be a major break, with the bone moved out of place.  Depending on where the break is, you may have pain when you chew. You may also have nasal congestion, sinus pain, and nose bleeding.   During the first 24 hours after injury, you may have more swelling or bruising where the break is, or around your eyes. A blow to the face strong enough to cause a broken bone may also cause a concussion or more serious brain injury.  Home care  · Use an ice pack on the injured area for no more than 15 to 20 minutes at a time. Do this every 1 to 2 hours for the first 24 to 48 hours. Then use the ice pack as needed to ease pain and swelling. To make an ice pack, put ice cubes in a plastic bag that seals at the top. Wrap the bag in a clean, thin towel or cloth. Never put ice or an ice pack directly on the skin.  · You may use over-the-counter pain medicine to control pain, unless another pain medicine was prescribed. Talk with " your provider before using this medicine if you have chronic liver or kidney disease.  · Sleep with your head raised on 2 or more pillows to ease swelling.  · If you have facial pain when eating, dont eat crunchy or chewy foods. A softer diet will be more comfortable for the first 2 to 3 weeks.  · If you were given antibiotics to prevent an infection, take them as directed until you have finished the prescription.  · If your nose bleeds, sit up and lean forward. Pinch your nostrils together for 10 to 15 minutes. If the bleeding doesnt stop, keep pinching your nostrils and call your healthcare provider. Dont blow your nose for 12 hours after the bleeding stops. This will allow a strong blood clot to form. Dont pick your nose.  Special note on concussions  If you had any symptoms of a concussion today, dont return to sports or any activity that could result in another head injury.  These are symptoms of a concussion:  · Nausea  · Vomiting  · Dizziness  · Confusion  · Headache  · Memory loss  · Loss of consciousness  Wait until all of your symptoms are gone and your provider says its OK to resume your activity. Having a second head injury before you fully recover from the first one can lead to serious brain injury.  Follow-up care  Follow up with your healthcare provider in 1 week, or as advised. This is to make sure the bone is healing as it should.  If you had X-rays or CT scans taken, you will be told of any new findings that may affect your care.  When to seek medical advice  Call your healthcare provider right away if any of these occur:  · Swelling or pain in your face that gets worse  · Redness, warmth, or pus draining from the injured area  · Fever of 100.4°F (38°C) or above lasting for 24 to 48 hours  · Double vision  Call 911   Call 911 if you have:  · Repeated vomiting  · Severe headache or dizziness  · Headache or dizziness that gets worse  · Abnormal drowsiness, or unable to wake up as  usual  · Confusion or change in behavior or speech  · Convulsion, or seizure   Date Last Reviewed: 12/3/2015  © 5596-6156 [employer]. [address] [place] [address], [place] [address]. All rights reserved. This information is not intended as a substitute for professional medical care. Always follow your healthcare professional's instructions.        Suture Care  Stitches (sutures) are used to close wounds. Sutures also help stop bleeding and speed healing. To help your wound heal, follow the tips on this handout.  Some sutures need to be removed by a healthcare provider. Others dissolve on their own. Sometimes strips of tape are used. Youll be told what kind of sutures you have.   Keep sutures clean  · Avoid doing things that could cause dirt or sweat to get on your sutures. If needed, cover your sutures with a bandage (dressing) to protect them.  · Dont pick at scabs. They help protect the wound.  · Dont wash the area around your sutures unless your healthcare provider says its OK. Then, follow his or her instructions for washing and drying.  Keep sutures dry  · Keep your sutures out of water.  · Take a sponge bath to avoid getting your sutures wound wet, unless your healthcare provider tells you otherwise.  · Ask your provider when can you take a shower or bathe.  · Ask your provider about the best way to keep your sutures dry when bathing or showering.  · If sutures get damp, pat them dry.  Changing your dressing  Leave the dressing in place until you are told to remove it or change it. Change it only as directed, using clean hands:  · After the first ___hours, change your dressing every ___hours.  · Change your dressing if it gets wet or dirty.  Other tips  · To help wounds on an arm or leg heal, use the affected limb as little as possible.  · To help reduce swelling and throbbing, raise the area with sutures above your heart.  · To help prevent itching, cover sutures with gauze. If sutures itch, try  not to scratch them.  · For pain relief, try acetaminophen or ibuprofen. Dont use aspirin. It can increase bleeding.  When to seek medical care  Call your healthcare provider if you notice any of the following signs:  · Increased soreness, pain, or tenderness after 24 hours  · A red streak, increased redness, or puffiness near the wound  · White, yellowish, or bad smelling discharge from the wound  · Bleeding that cant be stopped by applying pressure  · Steri-Strips fall off or stitches dissolve before the wound heals  · Fever over 100.4°F (38.0°C)   Date Last Reviewed: 7/1/2016  © 3058-0605 [employer]. [address] [place] [address], [place] [address]. All rights reserved. This information is not intended as a substitute for professional medical care. Always follow your healthcare professional's instructions.          Your Scheduled Appointments     Feb 13, 2017 11:00 AM CST   NM Bone Imaging Injection with [facility] NM5 PREP ROOM   [facility]    [address] [id number] [phone]            Feb 13, 2017  1:30 PM CST   Delayed Bone Scan with [facility] NM3 MG1 400LB LIMIT   [facility] [facility]    [address] [address]   [phone]            Feb 13, 2017  3:00 PM CST   Hospital Follow Up with PERLA Cordova   [facility] Medicine ([facility])    [address]  [place] [address]   [phone]            Mar 03, 2017 11:00 AM CST   Consult with MD Murali Noel - Hematology Oncology ([facility])    [address]  [place] [address]   [phone]            Mar 08, 2017 10:00 AM CST   Non-Fasting Lab with LAB, SAME DAY   [facility] ([facility])    [address] [address]   [phone]              Smoking Cessation     If you would like to quit  smoking:   You may be eligible for free services if you are a [place] resident and started smoking cigarettes before September 1, 1988.  Call the Smoking Cessation Trust (SCT) toll free at (392) 178-9890 or (762) 529-0373.   Call 1-800-QUIT-NOW if you do not meet the above criteria.             [facility] complies with applicable Federal civil rights laws and does not discriminate on the basis of race, color, national origin, age, disability, or sex.        Language Assistance Services     ATTENTION: Language assistance services are available, free of charge. Please call [phone].      ATENCIÓN: Si habla español, tiene a marcelo disposición servicios gratuitos de asistencia lingüística. Llame al [phone].     CHÚ Ý: N?u b?n nói Ti?ng Vi?t, có các d?ch v? h? tr? ngôn ng? mi?n phí dành cho b?n. G?i s? [phone].

## 2017-02-13 ENCOUNTER — TELEPHONE (OUTPATIENT)
Dept: PRIMARY CARE CLINIC | Facility: CLINIC | Age: 82
End: 2017-02-13

## 2017-02-13 ENCOUNTER — TELEPHONE (OUTPATIENT)
Dept: OPHTHALMOLOGY | Facility: CLINIC | Age: 82
End: 2017-02-13

## 2017-02-13 VITALS
OXYGEN SATURATION: 100 % | TEMPERATURE: 98 F | HEIGHT: 66 IN | RESPIRATION RATE: 18 BRPM | WEIGHT: 162 LBS | SYSTOLIC BLOOD PRESSURE: 167 MMHG | DIASTOLIC BLOOD PRESSURE: 73 MMHG | HEART RATE: 91 BPM | BODY MASS INDEX: 26.03 KG/M2

## 2017-02-13 PROCEDURE — 12013 RPR F/E/E/N/L/M 2.6-5.0 CM: CPT

## 2017-02-13 PROCEDURE — 25000003 PHARM REV CODE 250: Performed by: EMERGENCY MEDICINE

## 2017-02-13 RX ORDER — AMOXICILLIN AND CLAVULANATE POTASSIUM 875; 125 MG/1; MG/1
1 TABLET, FILM COATED ORAL 2 TIMES DAILY
Qty: 14 TABLET | Refills: 0 | Status: SHIPPED | OUTPATIENT
Start: 2017-02-13 | End: 2017-02-23

## 2017-02-13 RX ORDER — OXYMETAZOLINE HCL 0.05 %
2 SPRAY, NON-AEROSOL (ML) NASAL 2 TIMES DAILY
Qty: 15 ML | Refills: 0 | Status: SHIPPED | OUTPATIENT
Start: 2017-02-13 | End: 2017-02-16

## 2017-02-13 RX ORDER — OXYMETAZOLINE HCL 0.05 %
2 SPRAY, NON-AEROSOL (ML) NASAL 2 TIMES DAILY
Refills: 0 | COMMUNITY
Start: 2017-02-13 | End: 2017-02-13

## 2017-02-13 RX ORDER — PSEUDOEPHEDRINE HCL 30 MG
30 TABLET ORAL EVERY 6 HOURS PRN
Qty: 30 TABLET | Refills: 0 | Status: SHIPPED | OUTPATIENT
Start: 2017-02-13 | End: 2017-02-23

## 2017-02-13 RX ADMIN — NEOMYCIN AND POLYMYXIN B SULFATES AND BACITRACIN ZINC 15 G: 400; 3.5; 5 OINTMENT TOPICAL at 01:02

## 2017-02-13 NOTE — TELEPHONE ENCOUNTER
----- Message from Dionne Hyman sent at 2/13/2017 10:04 AM CST -----  Contact: Pt's daughter in law Kelly Tran  Pt is scheduled for an appt on 02/13 and requesting to cancel.          Please call Ms. Mendoza at [phone].          Thanks!

## 2017-02-13 NOTE — CONSULTS
Otolaryngology - Head and Neck Surgery  Consultation Report    Consultation From: ER    Chief Complaint: Facial bone fracture    History of Present Illness: 89 y.o. male presents with hx of CKDIII, CAD s/p CABG 2001, Gout, HLD, HTN, Prostate CA- untreated presents following a mechanical fall this afternoon where he hit his right face.  Patient denies any LOC and subsequently presents to the ER for further evaluation.  Patient denies any diplopia, nausea, vomiting, but does endorse right frontal pain secondary to a laceration he sustained.  CT Head was clear of any acute intracranial abnormalities.  CT Max/Face shows comminuted nasal bone fracture in addition to a R-orbital floor fracture with extension to the medial orbital wall w/ mild herniation of orbital fat.  No evidence of muscular entrapment visualized on CT.  Patient denies any acute change in nasal breathing and experienced some epistaxis which spontaneously resolved.  ENT consulted for evaluation for orbital and nasal bone fracture.  Patient currently on 81 mg ASA.      Review of Systems - Right facial pain, right periorbital swelling, right glabellar laceration.      Past Medical History: he  has a past medical history of Cancer; CKD (chronic kidney disease), stage III; Coronary artery disease; Edema of both legs; Gout, chronic; Hyperlipidemia; Hypertension; Prostate cancer; and S/P CABG x 1 (12-13-01).    Past Surgical History: he  has a past surgical history that includes Coronary artery bypass graft (12-13-01) and Coronary stent placement.    Social History: he  reports that he quit smoking about 27 years ago. He does not have any smokeless tobacco history on file. He reports that he drinks alcohol.    Family History: family history is negative for Heart disease, Heart attack, and Hypertension.    Medications:    lidocaine-EPINEPHrine 1%-1:100,000  10 mL Intradermal Once       Allergies: he is allergic to no known drug allergies.    Physical  Exam:  Temp:  [98.1 °F (36.7 °C)] 98.1 °F (36.7 °C)  Pulse:  [69-77] 77  Resp:  [13-20] 16  SpO2:  [98 %] 98 %  BP: (123-167)/(58-80) 123/58    General: A&Ox3, NAD  Head: Right supraorbital glabellar region with 4 cm transverse complex laceration which appears to extend to the muscular structures, no exposed bone visualized.    Eyes: Right upper eyelid hematoma and periorbital swelling,  Extraocular movements grossly intact without ophthalmoplegia or complaints of diplopia.  TTP over the right glabellar region near laceration site, but no palpable bony step off.  Unable to palpate any significant bony step off in the inferior orbital rim.     Ears: AD TM intact, no perforation, no hemotympanum, AS TM intact, no perforation, no hemotympanum  Nose: septal deviation to the left, no significant tenderness palpated over nasal bones, no visualized septal hematoma.    Mouth: predominantly edentulous, no gingival lacerations and gross step offs visualized.  Tongue mobile, FOM soft.   Neck: No palpable cervical LAD  Pulmonary: Symmetric chest rise.     CBC  No results for input(s): WBC, HGB, HCT, MCV, PLT in the last 24 hours.  BMP  No results for input(s): GLU, NA, K, CL, CO2, BUN, CREATININE, CALCIUM, MG in the last 24 hours.    Assessment: 89 y.o. M with hx of CAD s/p CABG, Gout, HLD, HTN, untreated Prostate CA s/p mechanical call with right 4 cm horizontal glabellar  laceration extending laterally above the right brow, comminuted nasal bone fracture-no evidence of septal hematoma, and right orbital floor fracture with mild herniation of orbital fat without gross evidence of radiographic or clinical evidence muscular  entrapment.      Plan:   - No acute ENT surgical intervention at this time  - Laceration repair per ED  - Augmentin 875/125 mg BID x 10 days  - Sinus precautions - no heavy lifting, sneeze with mouth open, no nose blowing  - Ice packs to right face   - f/u in 2 weeks post-discharge with ENT (Dr. Mcmillan) for  post-ER hospital visit.    - Patient was discussed with staff, Dr. Mcmillan who agrees w/ the plan above.

## 2017-02-13 NOTE — ED NOTES
Two patient identifiers checked and confirmed.    APPEARANCE: Resting comfortably in no acute distress. Patient has clean hair, skin and nails. Clothing is appropriate and properly fastened.  NEURO: Awake, alert, appropriate for age, and cooperative with a calm affect; pupils equal and round.  HEENT: [place] noted.   CARDIAC: Regular rate and rhythm. +2 edema noted to bilateral lower extremities.   RESPIRATORY: Airway is open and patent. Respirations are spontaneous on room air. Normal respiratory effort and rate noted.  GI/: Abdomen soft and non-distended. Patient is reported to void and stool appropriately for age.  NEUROVASCULAR: All extremities are warm and pink with +2 pulses and capillary refill less than 3 seconds.  MUSCULOSKELETAL: Generalized weakness noted at this time.  SKIN: Laceration above right eye that occurred after patient fell at home.

## 2017-02-13 NOTE — ED TRIAGE NOTES
Patient comes in after a fall at home. Patient states he was walking around the house when he stumbled and his head hit the floor. Patient states he tried to catch himself on the wall but missed it and landed face first onto the floor.

## 2017-02-13 NOTE — ED PROVIDER NOTES
Encounter Date: 2/12/2017    SCRIBE #1 NOTE: I, Jerson Silverman, am scribing for, and in the presence of,  Dr. Sandhu. I have scribed the following portions of the note - the Resident attestation.       History     Chief Complaint   Patient presents with    Fall     Tripped and fell, hit head, laceration to R eyebrow. Denies LOC.     Review of patient's allergies indicates:   Allergen Reactions    No known drug allergies      HPI   Mr. Tran is 89 YO male h/o CAD, HTN, prostate CA presenting with mechanical fall. Pt says he was at home when he slipped in hallways and landed on floor. He denies LOC. He got up from floor and was sitting in his car when family drove by and saw him bleeding. Has laceration at R eyebrow and complains of headache. He takes daily ASA 81, denies other blood thinners. No other injuries. Per son at bedside he is acting normally.     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     3 v incl LIMA to LAD     Past Medical History Pertinent Negatives   Diagnosis Date Noted    Seizures 2/3/2017    Stroke 2/3/2017     Past Surgical History   Procedure Laterality Date    Coronary artery bypass graft  12-13-01    Coronary stent placement       Family History   Problem Relation Age of Onset    Heart disease Neg Hx     Heart attack Neg Hx     Hypertension Neg Hx      Social History   Substance Use Topics    Smoking status: Former Smoker     Quit date: 11/14/1989    Smokeless tobacco: Not on file    Alcohol use Yes      Comment: 1/5th of vodka every 10 days     Review of Systems   Constitutional: Negative for fever.   HENT: Positive for facial swelling. Negative for sore throat.    Respiratory: Negative for shortness of  breath.    Cardiovascular: Negative for chest pain.   Gastrointestinal: Negative for nausea.   Genitourinary: Negative for dysuria.   Musculoskeletal: Negative for back pain.   Skin: Positive for wound. Negative for rash.   Neurological: Positive for headaches. Negative for weakness.   Hematological: Does not bruise/bleed easily.   All other systems reviewed and are negative.      Physical Exam   Initial Vitals   BP Pulse Resp Temp SpO2   02/12/17 1755 02/12/17 1755 02/12/17 1755 02/12/17 1755 02/12/17 1755   167/80 70 18 98.1 °F (36.7 °C) 98 %     Physical Exam    Nursing note and vitals reviewed.  Constitutional: He appears well-developed and well-nourished.   HENT:   Head: Normocephalic.   4 cm laceration above R eyebrow oozing blood  R eye ecchymosis, swelling    Tenderness to R maxillary sinus   Eyes: EOM are normal. Pupils are equal, round, and reactive to light.   EOMI  Denies diplopia  Mild chemosis lateral R conjunctiva    R Eyelids grossly swollen   Neck: Normal range of motion. Neck supple.   Cardiovascular: Normal rate and regular rhythm. Exam reveals no gallop and no friction rub.    No murmur heard.  Pulmonary/Chest: Breath sounds normal. He has no wheezes. He has no rhonchi. He has no rales.   Abdominal: Soft. Bowel sounds are normal. There is no tenderness. There is no rebound and no guarding.   Musculoskeletal: Normal range of motion.   Neurological: He is alert and oriented to person, place, and time. He has normal strength. No cranial nerve deficit or sensory deficit.   Skin: Skin is warm and dry.         ED Course   Lac Repair  Date/Time: 2/13/2017 2:39 AM  Performed by: MAXIMUS SZYMANSKI  Authorized by: LA PINEDA   Body area: head/neck  Location details: forehead  Laceration length: 4 cm  Foreign bodies: no foreign bodies  Tendon involvement: none  Nerve involvement: none  Vascular damage: no  Anesthesia: local infiltration    Anesthesia:  Anesthesia: local infiltration  Local  Anesthetic: lidocaine 1% with epinephrine   Anesthetic total: 5 mL  Patient sedated: no  Preparation: Patient was prepped and draped in the usual sterile fashion.  Irrigation solution: saline  Irrigation method: jet lavage  Amount of cleaning: standard  Debridement: none  Degree of undermining: none  Skin closure: 5-0 nylon  Number of sutures: 11  Technique: simple  Approximation: close  Approximation difficulty: complex  Dressing: 4x4 sterile gauze and antibiotic ointment  Patient tolerance: Patient tolerated the procedure well with no immediate complications        Labs Reviewed - No data to display          Medical Decision Making:   History:   Old Medical Records: I decided to obtain old medical records.  Clinical Tests:   Radiological Study: Ordered and Reviewed       APC / Resident Notes:   Ddx includes ICH, skull fracture, facial fracture. Neuro intact, not on blood thinners. Will CT head and max-face and laceration repair.   Edna Cleveland [address] Emergency Medicine  9:43 PM     CT with e/o orbital wall fracture. I have consulted ENT.   Edna Cleveland [address] Emergency Medicine  11:02 PM    ENT on the way. Opthalmology recommends follow up in clinic.   Edna Cleveland [address] Emergency Medicine  11:56 PM    Laceration was repaired. Tdap is UTD. Will dispo per ENT recs.   Edna Cleveland [address] Emergency Medicine  2:42 AM        Scribe Attestation:   Scribe #1: I performed the above scribed service and the documentation accurately describes the services I performed. I attest to the accuracy of the note.    Attending Attestation:   Physician Attestation Statement for Resident:  As the supervising MD   Physician Attestation Statement: I have personally seen and examined this patient.   I agree with the above history. -: This is an emergent evaluation of a 89 y.o. male who presents with head trauma after fall. Pt is not on blood thinners and denies LOC.    As the supervising MD  I agree with the above PE.   -: Pt has a laceration over the right eyebrow.    As the supervising MD I agree with the above treatment, course, plan, and disposition.   -: Plan to scan and repair the laceration.   I have reviewed and agree with the residents interpretation of the following: CT scans.          Physician Attestation for Scribe:  Physician Attestation Statement for Scribe #1: I, Dr. Sandhu, reviewed documentation, as scribed by Jerson Silverman in my presence, and it is both accurate and complete.         Attending ED Notes:   ENT and ophthalmology consultation for facial fractures.  Laceration repair without complication.  Follow-up plan explained to patient.  Return precautions advised.          ED Course     Clinical Impression:   The primary encounter diagnosis was Orbital wall fracture, closed, initial encounter. Diagnoses of Facial laceration, initial encounter and Fall, initial encounter were also pertinent to this visit.    Disposition:   Disposition: Discharged  Condition: Stable       Edna Cleveland MD  Resident  02/13/17 0245       Benji Sandhu MD  02/13/17 3299

## 2017-02-13 NOTE — DISCHARGE INSTRUCTIONS
Sinus precautions:   Take Augmentin 875-125mg twice daily for one week  Use Afrin nasal spray twice daily for three days only  Use Ocean Spray and/or Sudafed for congestion  No nose blowing  Sneeze with mouth open    Facial Fracture    You have a broken bone, or fracture, in your face. This may be a small crack in the bone. Or it may be a major break, with the bone moved out of place.  Depending on where the break is, you may have pain when you chew. You may also have nasal congestion, sinus pain, and nose bleeding.   During the first 24 hours after injury, you may have more swelling or bruising where the break is, or around your eyes. A blow to the face strong enough to cause a broken bone may also cause a concussion or more serious brain injury.  Home care  · Use an ice pack on the injured area for no more than 15 to 20 minutes at a time. Do this every 1 to 2 hours for the first 24 to 48 hours. Then use the ice pack as needed to ease pain and swelling. To make an ice pack, put ice cubes in a plastic bag that seals at the top. Wrap the bag in a clean, thin towel or cloth. Never put ice or an ice pack directly on the skin.  · You may use over-the-counter pain medicine to control pain, unless another pain medicine was prescribed. Talk with your provider before using this medicine if you have chronic liver or kidney disease.  · Sleep with your head raised on 2 or more pillows to ease swelling.  · If you have facial pain when eating, dont eat crunchy or chewy foods. A softer diet will be more comfortable for the first 2 to 3 weeks.  · If you were given antibiotics to prevent an infection, take them as directed until you have finished the prescription.  · If your nose bleeds, sit up and lean forward. Pinch your nostrils together for 10 to 15 minutes. If the bleeding doesnt stop, keep pinching your nostrils and call your healthcare provider. Dont blow your nose for 12 hours after the bleeding stops. This will allow a  strong blood clot to form. Dont pick your nose.  Special note on concussions  If you had any symptoms of a concussion today, dont return to sports or any activity that could result in another head injury.  These are symptoms of a concussion:  · Nausea  · Vomiting  · Dizziness  · Confusion  · Headache  · Memory loss  · Loss of consciousness  Wait until all of your symptoms are gone and your provider says its OK to resume your activity. Having a second head injury before you fully recover from the first one can lead to serious brain injury.  Follow-up care  Follow up with your healthcare provider in 1 week, or as advised. This is to make sure the bone is healing as it should.  If you had X-rays or CT scans taken, you will be told of any new findings that may affect your care.  When to seek medical advice  Call your healthcare provider right away if any of these occur:  · Swelling or pain in your face that gets worse  · Redness, warmth, or pus draining from the injured area  · Fever of 100.4°F (38°C) or above lasting for 24 to 48 hours  · Double vision  Call 911   Call 911 if you have:  · Repeated vomiting  · Severe headache or dizziness  · Headache or dizziness that gets worse  · Abnormal drowsiness, or unable to wake up as usual  · Confusion or change in behavior or speech  · Convulsion, or seizure   Date Last Reviewed: 12/3/2015  © 1631-6921 [employer]. [address] [place] [address], [place] [address]. All rights reserved. This information is not intended as a substitute for professional medical care. Always follow your healthcare professional's instructions.        Suture Care  Stitches (sutures) are used to close wounds. Sutures also help stop bleeding and speed healing. To help your wound heal, follow the tips on this handout.  Some sutures need to be removed by a healthcare provider. Others dissolve on their own. Sometimes strips of tape are used. Youll be told what kind of sutures you have.   Keep  sutures clean  · Avoid doing things that could cause dirt or sweat to get on your sutures. If needed, cover your sutures with a bandage (dressing) to protect them.  · Dont pick at scabs. They help protect the wound.  · Dont wash the area around your sutures unless your healthcare provider says its OK. Then, follow his or her instructions for washing and drying.  Keep sutures dry  · Keep your sutures out of water.  · Take a sponge bath to avoid getting your sutures wound wet, unless your healthcare provider tells you otherwise.  · Ask your provider when can you take a shower or bathe.  · Ask your provider about the best way to keep your sutures dry when bathing or showering.  · If sutures get damp, pat them dry.  Changing your dressing  Leave the dressing in place until you are told to remove it or change it. Change it only as directed, using clean hands:  · After the first ___hours, change your dressing every ___hours.  · Change your dressing if it gets wet or dirty.  Other tips  · To help wounds on an arm or leg heal, use the affected limb as little as possible.  · To help reduce swelling and throbbing, raise the area with sutures above your heart.  · To help prevent itching, cover sutures with gauze. If sutures itch, try not to scratch them.  · For pain relief, try acetaminophen or ibuprofen. Dont use aspirin. It can increase bleeding.  When to seek medical care  Call your healthcare provider if you notice any of the following signs:  · Increased soreness, pain, or tenderness after 24 hours  · A red streak, increased redness, or puffiness near the wound  · White, yellowish, or bad smelling discharge from the wound  · Bleeding that cant be stopped by applying pressure  · Steri-Strips fall off or stitches dissolve before the wound heals  · Fever over 100.4°F (38.0°C)   Date Last Reviewed: 7/1/2016  © 9096-5308 [employer]. [address] [place] [address], [place] [address]. All rights reserved. This  information is not intended as a substitute for professional medical care. Always follow your healthcare professional's instructions.

## 2017-02-14 ENCOUNTER — OFFICE VISIT (OUTPATIENT)
Dept: OPHTHALMOLOGY | Facility: CLINIC | Age: 82
End: 2017-02-14
Payer: MEDICARE

## 2017-02-14 ENCOUNTER — HOSPITAL ENCOUNTER (OUTPATIENT)
Dept: RADIOLOGY | Facility: HOSPITAL | Age: 82
Discharge: HOME | End: 2017-02-14
Attending: UROLOGY
Payer: MEDICARE

## 2017-02-14 DIAGNOSIS — C61 CA PROSTATE, ADENOCA: ICD-10-CM

## 2017-02-14 DIAGNOSIS — S02.31XD CLOSED FRACTURE OF RIGHT ORBITAL FLOOR WITH ROUTINE HEALING, SUBSEQUENT ENCOUNTER: Primary | ICD-10-CM

## 2017-02-14 DIAGNOSIS — Z96.1 PSEUDOPHAKIA, BOTH EYES: ICD-10-CM

## 2017-02-14 PROCEDURE — 99999 PR PBB SHADOW E&M-EST. PATIENT-LVL II: CPT | Mod: PBBFAC,,, | Performed by: OPHTHALMOLOGY

## 2017-02-14 PROCEDURE — A9503 TC99M MEDRONATE: HCPCS

## 2017-02-14 PROCEDURE — 78306 BONE IMAGING WHOLE BODY: CPT | Mod: 26,,, | Performed by: RADIOLOGY

## 2017-02-14 PROCEDURE — 1157F ADVNC CARE PLAN IN RCRD: CPT | Mod: S$GLB,,, | Performed by: OPHTHALMOLOGY

## 2017-02-14 PROCEDURE — 1160F RVW MEDS BY RX/DR IN RCRD: CPT | Mod: S$GLB,,, | Performed by: OPHTHALMOLOGY

## 2017-02-14 PROCEDURE — 1126F AMNT PAIN NOTED NONE PRSNT: CPT | Mod: S$GLB,,, | Performed by: OPHTHALMOLOGY

## 2017-02-14 PROCEDURE — 1159F MED LIST DOCD IN RCRD: CPT | Mod: S$GLB,,, | Performed by: OPHTHALMOLOGY

## 2017-02-14 PROCEDURE — 99213 OFFICE O/P EST LOW 20 MIN: CPT | Mod: S$GLB,,, | Performed by: OPHTHALMOLOGY

## 2017-02-14 NOTE — MR AVS SNAPSHOT
[facility] - Ophthalmology  [address]  [place] [address]  Phone: [phone]  Fax: [phone]                  Orlando Tran Jr.   2017 11:00 AM   Office Visit    Description:  Male : 1924   Provider:  Jerry Fuentes MD   Department:  [facility]           Reason for Visit     Follow-up           Diagnoses this Visit        Comments    Closed fracture of right orbital floor with routine healing, subsequent encounter    -  Primary     Pseudophakia, both eyes                To Do List           Future Appointments        Provider Department Dept Phone    2017 12:30 PM NOM NM5 PREP ROOM [facility] [phone]    2017 2:30 PM NOM NM2 INFINIA 400LB LIMIT [facility] [phone]    2017 1:00 PM PHYSICIAN, PRIORITY CLINIC [facility] [phone]    3/3/2017 11:00 AM Jaxson Jennings MD [facility] - Hematology Oncology [phone]    3/8/2017 10:00 AM LAB, SAME DAY [facility] [phone]      Goals (5 Years of Data)     None      Follow-Up and Disposition     Return if symptoms worsen or fail to improve.      [facility]     [facility] Care [facility] -  Assistance  Registered nurses in the [facility] provide clinical advisement, health education, appointment booking, and other advisory services.  Call for this free service at [phone].             Medications           Message regarding Medications     Verify the changes and/or additions to your medication regime listed below are the same as discussed with your clinician today.  If any of these changes or additions are incorrect, please notify your healthcare provider.             Verify that the below list of medications is an accurate representation of the medications you are currently taking.  If none reported, the list may be blank. If incorrect, please contact your healthcare provider. Carry  this list with you in case of emergency.           Current Medications     allopurinol (ZYLOPRIM) 100 MG tablet Take 1 tablet (100 mg total) by mouth Daily.    amoxicillin-clavulanate 875-125mg (AUGMENTIN) 875-125 mg per tablet Take 1 tablet by mouth 2 (two) times daily.    aspirin 81 mg Tab Take 1 tablet by mouth once daily.     carvedilol (COREG) 12.5 MG tablet Take 1 tablet (12.5 mg total) by mouth 2 (two) times daily with meals.    donepezil (ARICEPT) 5 MG tablet Take 1 tablet (5 mg total) by mouth once daily.    doxazosin (CARDURA) 2 MG tablet Take 1 tablet (2 mg total) by mouth every evening.    furosemide (LASIX) 40 MG tablet Take 1 tablet (40 mg total) by mouth 2 (two) times daily.    hydrALAZINE (APRESOLINE) 50 MG tablet Take 1 tablet (50 mg total) by mouth every 12 (twelve) hours.    oxymetazoline (AFRIN, OXYMETAZOLINE,) 0.05 % nasal spray 2 sprays by Nasal route 2 (two) times daily. Take for only three days    pseudoephedrine (SUDAFED) 30 MG tablet Take 1 tablet (30 mg total) by mouth every 6 (six) hours as needed for Congestion.    sodium chloride (SALINE NASAL) 0.65 % nasal spray 1 spray by Nasal route as needed for Congestion.           Clinical Reference Information           Allergies as of 2/14/2017     No Known Drug Allergies      Immunizations Administered on Date of Encounter - 2/14/2017     None      Instructions    Return as needed.       Language Assistance Services     ATTENTION: Language assistance services are available, free of charge. Please call [phone].      ATENCIÓN: Si charlotte narda, tiene a marcelo disposición servicios gratuitos de asistencia lingüística. Llame al [phone].     ROB Ý: N?u b?n nói Ti?ng Vi?t, có các d?ch v? h? tr? ngôn ng? mi?n phí dành cho b?n. G?i s? [phone].         [facility] complies with applicable Federal civil rights laws and does not discriminate on the basis of race, color, national origin, age, disability, or sex.

## 2017-02-14 NOTE — PROGRESS NOTES
HPI     Patient here for ED follow up Orbital wall fracture OD.  Denies any pain, but very tender and sore. Pt states RUL still swollen and   bruise.  No eye Drops    I have personally interviewed the patient, reviewed the history and   examined the patient and agree with the technician's &/or resident's exam,   assessment and plan.    CT shows inferior and medial wall blowout fx of right orbit.        Last edited by Jerry Fuentes MD on 2/14/2017 12:01 PM.     ROS     Positive for: Neurological, Genitourinary, Endocrine, Cardiovascular,   Eyes    Negative for: Constitutional, Gastrointestinal, Skin, Musculoskeletal,   HENT, Respiratory, Psychiatric, Allergic/Imm, Heme/Lymph    Last edited by Jerry Fuentes MD on 2/14/2017 12:01 PM. (History)        Assessment /Plan     For exam results, see Encounter Report.    Closed fracture of right orbital floor with routine healing, subsequent encounter    Pseudophakia, both eyes      Globe intact  No EOM restriction by imaging or clinically   No diplopia  Large floor fracture  No significant enophthalmos  Seen by ENT in the ER -> finish PO abx and continue sinus precautions  Return as needed.    I have personally interviewed the patient, reviewed the history and examined the patient and agree with the technician's &/or resident's exam, assessment and plan.    Jerry Fuentes MD

## 2017-02-15 ENCOUNTER — TELEPHONE (OUTPATIENT)
Dept: UROLOGY | Facility: CLINIC | Age: 82
End: 2017-02-15

## 2017-02-16 ENCOUNTER — PATIENT MESSAGE (OUTPATIENT)
Dept: INTERNAL MEDICINE | Facility: CLINIC | Age: 82
End: 2017-02-16

## 2017-02-16 DIAGNOSIS — F03.91 DEMENTIA WITH BEHAVIORAL DISTURBANCE, UNSPECIFIED DEMENTIA TYPE: Primary | ICD-10-CM

## 2017-02-25 NOTE — PT/OT/SLP DISCHARGE
Occupational Therapy Discharge Summary    Orlando Tran Jr.  MRN: [id number]   Acute liver failure without hepatic coma   Patient Discharged from acute Occupational Therapy on 2/7/17.  Please refer to prior OT note dated on 2/6/17 for functional status.     Assessment:   Patient was discharge unexpectedly.  Information required to complete and accurate discharge summary is unknown.  Refer to therapy initial evaluation and last progress note for initial and most recent functional status and goal achievement.  Recommendations made may be found in medical record.  GOALS:   Occupational Therapy Goals        Problem: Occupational Therapy Goal    Goal Priority Disciplines Outcome Interventions   Occupational Therapy Goal     OT, PT/OT Ongoing (interventions implemented as appropriate)    Description:  Goals to be met by: 2/18/17     Patient will increase functional independence with ADLs by performing:    LE Dressing with [place].  Grooming while standing at sink with Modified [place].  Toileting from toilet with Parmer for hygiene and clothing management.   Stand pivot transfers with Modified Parmer.  Toilet transfer to toilet with Modified Parmer.              Reasons for Discontinuation of Therapy Services  Transfer to alternate level of care.      Plan:  Patient Discharged to: Home no PT services needed.    SRINI Leal  2/25/2017  Pager: [phone]

## 2017-02-27 ENCOUNTER — TELEPHONE (OUTPATIENT)
Dept: INTERNAL MEDICINE | Facility: CLINIC | Age: 82
End: 2017-02-27

## 2017-02-27 ENCOUNTER — LAB VISIT (OUTPATIENT)
Dept: LAB | Facility: HOSPITAL | Age: 82
End: 2017-02-27
Attending: INTERNAL MEDICINE
Payer: MEDICARE

## 2017-02-27 ENCOUNTER — OFFICE VISIT (OUTPATIENT)
Dept: PRIMARY CARE CLINIC | Facility: CLINIC | Age: 82
End: 2017-02-27
Payer: MEDICARE

## 2017-02-27 VITALS
BODY MASS INDEX: 26.47 KG/M2 | SYSTOLIC BLOOD PRESSURE: 142 MMHG | DIASTOLIC BLOOD PRESSURE: 64 MMHG | HEIGHT: 66 IN | WEIGHT: 164.69 LBS | HEART RATE: 66 BPM

## 2017-02-27 DIAGNOSIS — I50.42 CHRONIC COMBINED SYSTOLIC AND DIASTOLIC CHF (CONGESTIVE HEART FAILURE): Primary | Chronic | ICD-10-CM

## 2017-02-27 DIAGNOSIS — R79.89 ELEVATED LIVER FUNCTION TESTS: ICD-10-CM

## 2017-02-27 DIAGNOSIS — E11.59 HYPERTENSION ASSOCIATED WITH DIABETES: Chronic | ICD-10-CM

## 2017-02-27 DIAGNOSIS — E78.2 COMBINED HYPERLIPIDEMIA ASSOCIATED WITH TYPE 2 DIABETES MELLITUS: ICD-10-CM

## 2017-02-27 DIAGNOSIS — Z95.1 S/P CABG X 1: ICD-10-CM

## 2017-02-27 DIAGNOSIS — I51.7 LEFT ATRIAL ENLARGEMENT: Chronic | ICD-10-CM

## 2017-02-27 DIAGNOSIS — N18.30 CKD (CHRONIC KIDNEY DISEASE), STAGE III: ICD-10-CM

## 2017-02-27 DIAGNOSIS — M1A.9XX0 CHRONIC GOUT WITHOUT TOPHUS, UNSPECIFIED CAUSE, UNSPECIFIED SITE: ICD-10-CM

## 2017-02-27 DIAGNOSIS — E11.69 COMBINED HYPERLIPIDEMIA ASSOCIATED WITH TYPE 2 DIABETES MELLITUS: ICD-10-CM

## 2017-02-27 DIAGNOSIS — C61 PROSTATE CANCER: ICD-10-CM

## 2017-02-27 DIAGNOSIS — E11.9 TYPE 2 DIABETES MELLITUS WITHOUT RETINOPATHY: ICD-10-CM

## 2017-02-27 DIAGNOSIS — I15.2 HYPERTENSION ASSOCIATED WITH DIABETES: Chronic | ICD-10-CM

## 2017-02-27 DIAGNOSIS — I25.10 CORONARY ARTERY DISEASE INVOLVING NATIVE CORONARY ARTERY OF NATIVE HEART WITHOUT ANGINA PECTORIS: ICD-10-CM

## 2017-02-27 DIAGNOSIS — R26.9 GAIT DISTURBANCE: ICD-10-CM

## 2017-02-27 DIAGNOSIS — I87.2 VENOUS INSUFFICIENCY OF BOTH LOWER EXTREMITIES: ICD-10-CM

## 2017-02-27 DIAGNOSIS — Z48.02 ENCOUNTER FOR REMOVAL OF SUTURES: ICD-10-CM

## 2017-02-27 DIAGNOSIS — F01.50 VASCULAR DEMENTIA WITHOUT BEHAVIORAL DISTURBANCE: Chronic | ICD-10-CM

## 2017-02-27 LAB
ALBUMIN SERPL BCP-MCNC: 2.7 G/DL
ALP SERPL-CCNC: 172 U/L
ALT SERPL W/O P-5'-P-CCNC: 12 U/L
ANION GAP SERPL CALC-SCNC: 8 MMOL/L
AST SERPL-CCNC: 17 U/L
BILIRUB SERPL-MCNC: 1.1 MG/DL
BUN SERPL-MCNC: 20 MG/DL
CALCIUM SERPL-MCNC: 9.2 MG/DL
CHLORIDE SERPL-SCNC: 103 MMOL/L
CO2 SERPL-SCNC: 26 MMOL/L
CREAT SERPL-MCNC: 1.5 MG/DL
EST. GFR  (AFRICAN AMERICAN): 46.1 ML/MIN/1.73 M^2
EST. GFR  (NON AFRICAN AMERICAN): 39.8 ML/MIN/1.73 M^2
GLUCOSE SERPL-MCNC: 79 MG/DL
POTASSIUM SERPL-SCNC: 4.8 MMOL/L
PROT SERPL-MCNC: 7 G/DL
SODIUM SERPL-SCNC: 137 MMOL/L

## 2017-02-27 PROCEDURE — 1126F AMNT PAIN NOTED NONE PRSNT: CPT | Mod: S$GLB,,, | Performed by: INTERNAL MEDICINE

## 2017-02-27 PROCEDURE — 1160F RVW MEDS BY RX/DR IN RCRD: CPT | Mod: S$GLB,,, | Performed by: INTERNAL MEDICINE

## 2017-02-27 PROCEDURE — 99215 OFFICE O/P EST HI 40 MIN: CPT | Mod: 25,S$GLB,, | Performed by: INTERNAL MEDICINE

## 2017-02-27 PROCEDURE — 99499 UNLISTED E&M SERVICE: CPT | Mod: S$GLB,,, | Performed by: INTERNAL MEDICINE

## 2017-02-27 PROCEDURE — 99999 PR PBB SHADOW E&M-EST. PATIENT-LVL III: CPT | Mod: PBBFAC,,,

## 2017-02-27 PROCEDURE — 99024 POSTOP FOLLOW-UP VISIT: CPT | Mod: S$GLB,,, | Performed by: INTERNAL MEDICINE

## 2017-02-27 PROCEDURE — 80053 COMPREHEN METABOLIC PANEL: CPT

## 2017-02-27 PROCEDURE — 1159F MED LIST DOCD IN RCRD: CPT | Mod: S$GLB,,, | Performed by: INTERNAL MEDICINE

## 2017-02-27 PROCEDURE — 36415 COLL VENOUS BLD VENIPUNCTURE: CPT

## 2017-02-27 PROCEDURE — 1157F ADVNC CARE PLAN IN RCRD: CPT | Mod: S$GLB,,, | Performed by: INTERNAL MEDICINE

## 2017-02-27 NOTE — PROGRESS NOTES
[facility]  New Visit Progress Note   Recent Hospital Discharge     PRESENTING HISTORY     Chief Complaint/Reason for Visit:  Follow up Hospital Discharge   Chief Complaint   Patient presents with    Follow-up     PCP: Jordon Spence MD    History of Present Illness: Mr. Orlando Tran Jr. is a 89 y.o. male who was recently admitted to the hospital.    Patient Name: Orlando Tran Jr.  MRN: [id number]  Admission Date: 2/4/2017  Hospital Length of Stay: 3 days  Discharge Date and Time:  02/07/2017 4:09 PM  Attending Physician: Reyes Mai MD   Discharging Provider: Haley Correa MD  Primary Care Provider: Jordon Spence MD  [facility] Medicine Team: [facility] HOSP MED 5 Haley Correa MD     HPI:   89 yo male with PMH of chronic diastolic HF, CAD s/p CABG x 1, HTN, prostate adenocarcinoma, HLD, CKD-III, venous insufficiency who presents to [facility] for acute encephalopathy and bilateral leg weakness. Patient was is his normal state of health until this afternoon when patient's son saw patient sitting in the car staring at him blankly and not as responsive as per usual. The son attempted to help him exit the car, and the patient was unable to hold himself up due to lower extremity weakness. Patient's son denies loss of consciousness, urinary/bowel incontinence, or convulsion. When EMS arrived they noted expressive aphasia but facial droop or drift. Patient's son notes that patient has returned to his baseline in the ED. Son also mentions that patient has been a little more short of breath with exertion, but denies orthopnea, increased LE edema. Patient has had a good appetite lately but does not avoid salty foods.     Hospital Course:   CT head in ED was negative for acute hemorrhages or infarctions and no masses. CXR showed mild cardiomegaly.small right effusion. He was given IV lasix 80 mg in ED. Liver enzymes were elevated on admission so liver injury work-up was initiated. All meds that could cause liver injury were held.  US negative for portal vein thrombosis. Hepatology was consulted and suspect ischemic hepatitis versus medications.              Final Active Diagnoses:     Diagnosis Date Noted POA    PRINCIPAL PROBLEM: Acute liver failure without hepatic coma [K72.00] 02/04/2017 Yes    Acute encephalopathy [G93.40] 02/04/2017 Yes    Type 2 diabetes mellitus without retinopathy [E11.9] 06/27/2016 Yes    Coronary artery disease involving native coronary artery without angina pectoris [I25.10] 11/13/2015 Yes    Acute on chronic diastolic heart failure [I50.33] 07/24/2015 Yes    Chronic gout without tophus [M1A.9XX0] 07/24/2015 Yes    Dementia without behavioral disturbance [F03.90] 04/02/2015 Yes       Chronic    Prostate cancer [C61] 01/13/2015 Yes    CKD (chronic kidney disease), stage III [N18.3]   Yes    Hypertension associated with diabetes [E11.59, I10]   Yes       Chronic    Combined hyperlipidemia associated with type 2 diabetes mellitus [E11.69, E78.2]   Yes    S/P CABG x 1 - [facility] many yrs ago [Z95.1]   Not Applicable       Problems Resolved During this Admission:     Diagnosis Date Noted Date Resolved POA      No new Assessment & Plan notes have been filed under this hospital service since the last note was generated.  Service: Hospital Medicine     Discharged Condition: good     Disposition: Home or Self Care     ___________________________________________________________________    Today:  He slipped at home and hit forehead. Was in ED 2-12.  He is doing okay.  Slow walking with cane and walker.  Has balance issues.    Review of Systems:  General: Well developed, well nourished. No distress.  HEENT: Head is normocephalic, atraumatic; ears are normal.    Eyes: Clear conjunctiva.  Neck: Supple, symmetrical neck; trachea midline.  Lungs: Clear to auscultation bilaterally and normal respiratory effort.  Cardiovascular: Heart with regular rate and rhythm.    Extremities: No LE edema. Pulses 2+ and symmetric.    Abdomen: Abdomen is soft, non-tender non-distended with normal bowel sounds.  Skin: Skin color, texture, turgor normal. No rashes.  Musculoskeletal: Normal gait.   Lymph Nodes: No cervical or supraclavicular adenopathy.  Neurologic: Normal strength and tone. No focal numbness or weakness.   Psychiatric: Not depressed.    PAST HISTORY:     Past Medical History:   Diagnosis Date    Chronic combined systolic and diastolic CHF (congestive heart failure) 7/24/2015 2-2017:  1 - Concentric LVH with mildly to moderately depressed left ventricular systolic function (EF 40-45%).    2 - Severe left atrial enlargement.    3 - Left ventricular diastolic dysfunction with increased LAP.    4 - AV sclerosis with mild aortic regurgitation.    5 - Mild to moderate mitral regurgitation.    6 - Mild tricuspid regurgitation.    7 - Pulmonary hypertension. The estimated PA     Chronic gout without tophus 7/24/2015    CKD (chronic kidney disease), stage III     Combined hyperlipidemia associated with type 2 diabetes mellitus     not currently on statin     Coronary artery disease involving native coronary artery without angina pectoris 11/13/2015    s/p stents to LCF & RCA - patent 2011      Dementia without behavioral disturbance 4/2/2015    Gout, chronic     Hypertension associated with diabetes     Left atrial enlargement 2/27/2017    Prostate cancer     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    3 v incl LIMA to LAD    Secondary pulmonary hypertension 2/27/2017    Venous insufficiency of leg - chronic 9/9/2014       Past Surgical History:   Procedure Laterality Date    CORONARY ARTERY BYPASS GRAFT  12-13-01    CORONARY STENT PLACEMENT         Family History   Problem Relation Age of Onset    Heart disease Neg Hx     Heart attack Neg Hx     Hypertension Neg Hx        Social History     Social History    Marital status:      Spouse name: N/A    Number of children: N/A    Years of education: N/A     Social History Main  Topics    Smoking status: Former Smoker     Quit date: 1989    Smokeless tobacco: None    Alcohol use Yes      Comment:  of vodka every 10 days    Drug use: None    Sexual activity: Not Asked     Other Topics Concern    None     Social History Narrative    2015: Wife  2015 from cancer.    Retired .     MEDICATIONS & ALLERGIES:     Current Outpatient Prescriptions on File Prior to Visit   Medication Sig Dispense Refill    allopurinol (ZYLOPRIM) 100 MG tablet Take 1 tablet (100 mg total) by mouth Daily. 30 tablet 1    aspirin 81 mg Tab Take 1 tablet by mouth once daily.       carvedilol (COREG) 12.5 MG tablet Take 1 tablet (12.5 mg total) by mouth 2 (two) times daily with meals. 180 tablet 3    donepezil (ARICEPT) 5 MG tablet Take 1 tablet (5 mg total) by mouth once daily. 90 tablet 3    doxazosin (CARDURA) 2 MG tablet Take 1 tablet (2 mg total) by mouth every evening. 90 tablet 3    furosemide (LASIX) 40 MG tablet Take 1 tablet (40 mg total) by mouth 2 (two) times daily. 180 tablet 3    hydrALAZINE (APRESOLINE) 50 MG tablet Take 1 tablet (50 mg total) by mouth every 12 (twelve) hours. 180 tablet 3    sodium chloride (SALINE NASAL) 0.65 % nasal spray 1 spray by Nasal route as needed for Congestion. 15 mL 12       Review of patient's allergies indicates:   Allergen Reactions    No known drug allergies        OBJECTIVE:     Vital Signs:  Vitals:    17 1102   BP: (!) 142/64   Pulse: 66     Wt Readings from Last 1 Encounters:   17 1102 74.7 kg (164 lb 10.9 oz)     Body mass index is 26.58 kg/(m^2).     Physical Exam:  General: Well developed, well nourished. No distress.  HEENT: Head is normocephalic, atraumatic; ears are normal.    Right forehead: sutured removed.  Eyes: Clear conjunctiva.  Neck: Supple, symmetrical neck; trachea midline.  Lungs: Clear to auscultation bilaterally and normal respiratory effort.  Cardiovascular: Heart with regular rate and  rhythm.    Extremities:Trace pedal edema.    Abdomen: Abdomen is soft, non-tender non-distended with normal bowel sounds.  Skin: Skin color, texture, turgor normal. No rashes.  Musculoskeletal: weak gait.  Psychiatric: Not depressed. Alert.    Laboratory  Lab Results   Component Value Date    WBC 4.32 02/07/2017    HGB 12.0 (L) 02/07/2017    HCT 37.5 (L) 02/07/2017    MCV 90 02/07/2017     02/07/2017     BMP  Lab Results   Component Value Date     02/07/2017    K 3.5 02/07/2017     02/07/2017    CO2 23 02/07/2017    BUN 25 02/07/2017    CREATININE 1.7 (H) 02/07/2017    CALCIUM 9.1 02/07/2017    ANIONGAP 10 02/07/2017    ESTGFRAFRICA 39.6 (A) 02/07/2017    EGFRNONAA 34.2 (A) 02/07/2017     Lab Results   Component Value Date     (H) 02/07/2017     (H) 02/07/2017    ALKPHOS 131 02/07/2017    BILITOT 1.5 (H) 02/07/2017     Lab Results   Component Value Date    INR 1.3 (H) 02/07/2017    INR 1.4 (H) 02/06/2017    INR 1.8 (H) 02/05/2017     Lab Results   Component Value Date    HGBA1C 5.7 01/13/2017     TRANSITION OF CARE:     Transition of Care Visit:     I have reviewed and updated the history and problem list.  I have reconciled the medication list.  I have discussed the hospitalization and current medical issues, prognosis and plans with the patient/family.  I  spent more than 50% of time discussing the care with the patient/family.  Total Encounter in the [facility]: 60 minutes.    Medications Reconciliation:   I have reconciled the patient's home medications and discharge medications with the patient/family. I have updated all changes.  Refer to After-Visit Medication List.    ASSESSMENT & PLAN:     Chronic combined systolic and diastolic CHF (congestive heart failure)  Secondary pulmonary hypertension  Left atrial enlargement  Hypertension associated with diabetes  S/P CABG x 1 - [facility] many yrs ago  Coronary artery disease involving native coronary artery of native heart  without angina pectoris        - Compensated currently.  - Currently on Carvedilol 12.5 mg BID, Hydralazine 40 mg BID, Lasix 40 mg BID.  - .  Will add Isordil 2.5 mg BID (since African American and on Hydralazine already).  - Aspirin for LAE.    Vaccine Update:  -     DIPH,PERTUSS(ACEL),TET VAC(PF)(ADULT)(ADACEL)(TDaP); Inject 0.5 mLs into the muscle one time.    CKD (chronic kidney disease), stage III  - Stable.      Vascular dementia without behavioral disturbance  - Stable. On Aricept daily.  - Recent Fall - had facial fracture and forehead sutures.    Suture removed by me today.    Combined hyperlipidemia associated with type 2 diabetes mellitus  Elevated liver function tests  - Elevated LFT thought to congestion plus medications.    Allopurinol was decreased from 300 mg to 100 mg daily.    Statin discontinued.    Casodex held.  -     Comprehensive metabolic panel; Future; Expected date: 2/27/17    Chronic gout without tophus, unspecified cause, unspecified site  - On allopurinol 100 mg daily.    Type 2 diabetes mellitus without retinopathy  - Diet controlled. A1c 5.7    Prostate cancer  - Followed by Urology and Oncology.    Casodex was held during hospitalization due to increased LFT.    Will follow up with Dr. Jennings 3/3.    Venous insufficiency of both lower extremities  - On diuretic.    Gait disturbance  -  Will order PT/OT at home.    Scheduled Follow-up :    Refer to Dr. Slaes for future follow up.    Future Appointments  Date Time Provider Department Center   2/27/2017 12:00 PM LAB, APPOINTMENT [facility] INTMED [facility] LAB IM [facility]   3/3/2017 11:00 AM Jaxson Jennings MD [facility] HEM ONC [facility]   3/8/2017 10:00 AM LAB, SAME DAY [facility] LAB VNP [facility]   3/8/2017 10:15 AM SPECIMEN, [facility] SPECLAB [facility]   3/10/2017 2:00 PM Dominick Cross MD [facility] NEURO [place]   3/13/2017 8:30 AM Mandie Sales MD [facility] IM [facility]   3/13/2017 11:20 AM Gregg Self MD  [facility] NEPHRO [facility]       After Visit Medication List :     Medication List          This list is accurate as of: 2/27/17 11:56 AM.  Always use your most recent med list.                     allopurinol 100 MG tablet   Commonly known as:  ZYLOPRIM   Take 1 tablet (100 mg total) by mouth Daily.       aspirin 81 mg Tab       carvedilol 12.5 MG tablet   Commonly known as:  COREG   Take 1 tablet (12.5 mg total) by mouth 2 (two) times daily with meals.       donepezil 5 MG tablet   Commonly known as:  ARICEPT   Take 1 tablet (5 mg total) by mouth once daily.       doxazosin 2 MG tablet   Commonly known as:  CARDURA   Take 1 tablet (2 mg total) by mouth every evening.       furosemide 40 MG tablet   Commonly known as:  LASIX   Take 1 tablet (40 mg total) by mouth 2 (two) times daily.       hydrALAZINE 50 MG tablet   Commonly known as:  APRESOLINE   Take 1 tablet (50 mg total) by mouth every 12 (twelve) hours.       isosorbide dinitrate 2.5 mg Subl   Commonly known as:  ISORDIL   Place 1 tablet (2.5 mg total) under the tongue 2 (two) times daily.       sodium chloride 0.65 % nasal spray   Commonly known as:  SALINE NASAL   1 spray by Nasal route as needed for Congestion.            Where to Get Your Medications      These medications were sent to [facility] [place] [place] - [address]  [address], [place] [address]     Phone:  [phone]     isosorbide dinitrate 2.5 mg Subl               Signing Physician:  Reyes Mai MD

## 2017-02-27 NOTE — Clinical Note
[facility] Visit (Post Discharge Follow-up) Today:  - Our clinic physicians and nurses plan to follow the patient up for any medical issues in the [facility] for 30 days post discharge.  Jordon, I am sending him to [facility]. Home Health order by me today.  Jaxson, his Casodex was held due to elevation in LFTs in the hospital (thought be combination of hepatic congestion, meds such as statin, high dose allopurinol and Casodex). Please re-evaluate.  Future Appointments: 3/3/2017   11:00 AM   Jaxson Jennings MD         [facility] HEM ONC   [facility]i Crenshaw  3/8/2017   10:00 AM   LAB, SAME DAY              [facility] LAB VNP   [facility]  3/8/2017   10:15 AM   SPECIMEN, [facility] SPECLAB   [facility]  3/10/2017  2:00 PM    Dominick Cross MD     [facility] NEURO     [place] 3/13/2017  8:30 AM    Mandie Sales MD   [facility] IM        [facility]  3/13/2017  11:20 AM   Gregg Self MD       [facility] NEPHRO    [facility]

## 2017-02-27 NOTE — MR AVS SNAPSHOT
[facility]  [address]  [facility] [address]  Phone: [phone]                  Orlando Tran Jr.   2017 11:00 AM   Office Visit    Description:  Male : [id number]   Provider:  PHYSICIAN, [facility]   Department:  [facility]           Reason for Visit     Follow-up           Diagnoses this Visit        Comments    Chronic combined systolic and diastolic CHF (congestive heart failure)    -  Primary     Secondary pulmonary hypertension         Left atrial enlargement         Combined hyperlipidemia associated with type 2 diabetes mellitus         CKD (chronic kidney disease), stage III         Vascular dementia without behavioral disturbance         S/P CABG x 1         Hypertension associated with diabetes         Chronic gout without tophus, unspecified cause, unspecified site         Coronary artery disease involving native coronary artery of native heart without angina pectoris         Type 2 diabetes mellitus without retinopathy         Prostate cancer         Venous insufficiency of both lower extremities         Elevated liver function tests         Gait disturbance                To Do List           Future Appointments        Provider Department Dept Phone    3/3/2017 11:00 AM MD Murali Noel - Hematology Oncology [phone]    3/8/2017 10:00 AM LAB, SAME DAY [facility] [phone]    3/8/2017 10:15 AM SPECIMEN, MAIN CAMPUS [facility] [phone]    3/10/2017 2:00 PM MD Daniel Rubalcava - Neurology [phone]    3/13/2017 8:30 AM Mandie Sales MD [facility] - Internal Medicine [phone]      Goals (5 Years of Data)     None       These Medications        Disp Refills Start End    isosorbide dinitrate (ISORDIL) 2.5 mg Subl 60 tablet 11 2017     Place 1 tablet (2.5 mg total) under the tongue 2 (two) times daily. - Sublingual    Pharmacy: [facility]  [address] #: [phone]         [facility] On Call     [facility] On Call Nurse Care Line - 24/7 Assistance  Registered nurses in the [facility] On Call Center provide clinical advisement, health education, appointment booking, and other advisory services.  Call for this free service at [phone].             Medications           Message regarding Medications     Verify the changes and/or additions to your medication regime listed below are the same as discussed with your clinician today.  If any of these changes or additions are incorrect, please notify your healthcare provider.        START taking these NEW medications        Refills    isosorbide dinitrate (ISORDIL) 2.5 mg Subl 11    Sig: Place 1 tablet (2.5 mg total) under the tongue 2 (two) times daily.    Class: Normal    Route: Sublingual      These medications were administered today        Dose Freq    DIPH,PERTUSS(ACEL),TET VAC(PF)(ADULT)(ADACEL)(TDaP) 0.5 mL Clinic/HOD 1 time    Sig: Inject 0.5 mLs into the muscle one time.    Class: Normal    Route: Intramuscular           Verify that the below list of medications is an accurate representation of the medications you are currently taking.  If none reported, the list may be blank. If incorrect, please contact your healthcare provider. Carry this list with you in case of emergency.           Current Medications     allopurinol (ZYLOPRIM) 100 MG tablet Take 1 tablet (100 mg total) by mouth Daily.    aspirin 81 mg Tab Take 1 tablet by mouth once daily.     carvedilol (COREG) 12.5 MG tablet Take 1 tablet (12.5 mg total) by mouth 2 (two) times daily with meals.    donepezil (ARICEPT) 5 MG tablet Take 1 tablet (5 mg total) by mouth once daily.    doxazosin (CARDURA) 2 MG tablet Take 1 tablet (2 mg total) by mouth every evening.    furosemide (LASIX) 40 MG tablet Take 1 tablet (40 mg total) by mouth 2 (two) times daily.    hydrALAZINE (APRESOLINE) 50 MG tablet Take 1 tablet (50 mg  total) by mouth every 12 (twelve) hours.    sodium chloride (SALINE NASAL) 0.65 % nasal spray 1 spray by Nasal route as needed for Congestion.    isosorbide dinitrate (ISORDIL) 2.5 mg Subl Place 1 tablet (2.5 mg total) under the tongue 2 (two) times daily.           Clinical Reference Information           Your Vitals Were     BP                   142/64           Blood Pressure          Most Recent Value    BP  (!)  142/64      Allergies as of 2/27/2017     No Known Drug Allergies      Immunizations Administered on Date of Encounter - 2/27/2017     Name Date Dose VIS Date Route    TDAP 2/27/2017 0.5 mL 2/24/2015 Intramuscular      Orders Placed During Today's Visit      Normal Orders This Visit    Ambulatory referral to Home Health     Future Labs/Procedures Expected by Expires    Comprehensive metabolic panel  2/27/2017 5/28/2017      Administrations This Visit     DIPH,PERTUSS(ACEL),TET VAC(PF)(ADULT)(ADACEL)(TDaP)     Admin Date Action Dose Route Administered By             02/27/2017 Given 0.5 mL Intramuscular Veronica Harper LPN                      Language Assistance Services     ATTENTION: Language assistance services are available, free of charge. Please call [phone].      ATENCIÓN: Si habla español, tiene a marcelo disposición servicios gratuitos de asistencia lingüística. Llame al [phone].     ROB Ý: N?u b?n nói Ti?ng Vi?t, có các d?ch v? h? tr? ngôn ng? mi?n phí dành cho b?n. G?i s? [phone].         [facility] complies with applicable Federal civil rights laws and does not discriminate on the basis of race, color, national origin, age, disability, or sex.

## 2017-02-27 NOTE — PROGRESS NOTES
HOME HEALTH ORDERS  FACE TO FACE ENCOUNTER    Patient Name: Orlando Tran Jr.  YOB: 1924    PCP: Jordon Spence MD Will Be Dr. Sales on 3-13-17  PCP Address: [address] / [place] [address]  PCP Phone Number: [phone]  PCP Fax: [phone]    Encounter Date: 02/27/2017    Admit to Home Health    Diagnoses:  Patient Active Problem List   Diagnosis    S/P CABG x 1 - [facility] many yrs ago    Combined hyperlipidemia associated with type 2 diabetes mellitus    Hypertension associated with diabetes    CKD (chronic kidney disease), stage III    Venous insufficiency of leg - chronic    Prostate cancer    Dementia without behavioral disturbance    Chronic gout without tophus    Chronic combined systolic and diastolic CHF (congestive heart failure)    Vitamin D insufficiency    Osteopenia    Coronary artery disease involving native coronary artery without angina pectoris    Type 2 diabetes mellitus without retinopathy    Bilateral carotid artery disease    Secondary pulmonary hypertension    Left atrial enlargement    Elevated liver function tests    Gait disturbance       Future Appointments  Date Time Provider Department Center   2/27/2017 12:00 PM LAB, APPOINTMENT [facility] INTMED [facility] IM Manuel Farmer [facility]   3/3/2017 11:00 AM Jaxson Jennings MD [facility] HEM ONC [place]   3/8/2017 10:00 AM LAB, SAME DAY [facility] LAB VNP [facility]   3/8/2017 10:15 AM SPECIMEN, [facility] SPECLAB [facility]   3/10/2017 2:00 PM Dominick Cross MD [facility] NEURO [place]   3/13/2017 8:30 AM Mandie Sales MD [facility] IM Manuel Re[facility]esy [facility]   3/13/2017 11:20 AM Gregg Self MD [facility] NEPHRO Manuel Farmer       I have seen and examined this patient face to face today. My clinical findings that support the need for the home health skilled services and home bound status are the following:  Weakness/numbness causing balance and gait disturbance due to Heart Failure, Weakness/Debility and  Malignancy/Cancer making it taxing to leave home.  Requiring assistive device to leave home due to unsteady gait caused by  Heart Failure and Malignancy/Cancer.    Allergies:  Review of patient's allergies indicates:   Allergen Reactions    No known drug allergies        Diet: cardiac diet    Activities: activity as tolerated    Nursing:   SN to complete comprehensive assessment including routine vital signs. Instruct on disease process and s/s of complications to report to MD. Review/verify medication list sent home with the patient at time of discharge  and instruct patient/caregiver as needed. Frequency may be adjusted depending on start of care date.    Notify MD if SBP > 160 or < 90; DBP > 90 or < 50; HR > 120 or < 50; Temp > 101       CONSULTS:    Physical Therapy to evaluate and treat. Evaluate for home safety and equipment needs; Establish/upgrade home exercise program. Perform / instruct on therapeutic exercises, gait training, transfer training, and Range of Motion.  Occupational Therapy to evaluate and treat. Evaluate home environment for safety and equipment needs. Perform/Instruct on transfers, ADL training, ROM, and therapeutic exercises.  Aide to provide assistance with personal care, ADLs, and vital signs.    Medications: Review discharge medications with patient and family and provide education.         Medication List          This list is accurate as of: 2/27/17 11:57 AM.  Always use your most recent med list.                     allopurinol 100 MG tablet   Commonly known as:  ZYLOPRIM   Take 1 tablet (100 mg total) by mouth Daily.       aspirin 81 mg Tab       carvedilol 12.5 MG tablet   Commonly known as:  COREG   Take 1 tablet (12.5 mg total) by mouth 2 (two) times daily with meals.       donepezil 5 MG tablet   Commonly known as:  ARICEPT   Take 1 tablet (5 mg total) by mouth once daily.       doxazosin 2 MG tablet   Commonly known as:  CARDURA   Take 1 tablet (2 mg total) by mouth every  evening.       furosemide 40 MG tablet   Commonly known as:  LASIX   Take 1 tablet (40 mg total) by mouth 2 (two) times daily.       hydrALAZINE 50 MG tablet   Commonly known as:  APRESOLINE   Take 1 tablet (50 mg total) by mouth every 12 (twelve) hours.       isosorbide dinitrate 2.5 mg Subl   Commonly known as:  ISORDIL   Place 1 tablet (2.5 mg total) under the tongue 2 (two) times daily.       sodium chloride 0.65 % nasal spray   Commonly known as:  SALINE NASAL   1 spray by Nasal route as needed for Congestion.            Where to Get Your Medications      These medications were sent to [facility] [place] [place] - [address]  [address], [place] [address]     Phone:  [phone]     isosorbide dinitrate 2.5 mg Subl             I certify that this patient is confined to his home and needs intermittent skilled nursing care, physical therapy and speech therapy.      Reyes Mai MD    [facility]  Phone: [phone]  Fax: [phone]

## 2017-03-03 ENCOUNTER — INITIAL CONSULT (OUTPATIENT)
Dept: HEMATOLOGY/ONCOLOGY | Facility: CLINIC | Age: 82
End: 2017-03-03
Payer: MEDICARE

## 2017-03-03 VITALS
WEIGHT: 164.88 LBS | HEART RATE: 70 BPM | TEMPERATURE: 98 F | SYSTOLIC BLOOD PRESSURE: 152 MMHG | DIASTOLIC BLOOD PRESSURE: 67 MMHG | BODY MASS INDEX: 27.47 KG/M2 | HEIGHT: 65 IN

## 2017-03-03 DIAGNOSIS — C61 PROSTATE CANCER: ICD-10-CM

## 2017-03-03 PROCEDURE — 99999 PR PBB SHADOW E&M-EST. PATIENT-LVL III: CPT | Mod: PBBFAC,,, | Performed by: INTERNAL MEDICINE

## 2017-03-03 PROCEDURE — 1157F ADVNC CARE PLAN IN RCRD: CPT | Mod: S$GLB,,, | Performed by: INTERNAL MEDICINE

## 2017-03-03 PROCEDURE — 99205 OFFICE O/P NEW HI 60 MIN: CPT | Mod: S$GLB,,, | Performed by: INTERNAL MEDICINE

## 2017-03-03 PROCEDURE — 1126F AMNT PAIN NOTED NONE PRSNT: CPT | Mod: S$GLB,,, | Performed by: INTERNAL MEDICINE

## 2017-03-03 PROCEDURE — 1160F RVW MEDS BY RX/DR IN RCRD: CPT | Mod: S$GLB,,, | Performed by: INTERNAL MEDICINE

## 2017-03-03 PROCEDURE — 1159F MED LIST DOCD IN RCRD: CPT | Mod: S$GLB,,, | Performed by: INTERNAL MEDICINE

## 2017-03-03 PROCEDURE — 99499 UNLISTED E&M SERVICE: CPT | Mod: S$GLB,,, | Performed by: INTERNAL MEDICINE

## 2017-03-03 NOTE — LETTER
March 3, 2017      Sachin Finley Jr., MD  [address]  [facility] [address]           [facility] Hematology Oncology  [address]  [facility] [address]  Phone: [phone]          Patient: Orlando Tran Jr.   MR Number: [id number]   YOB: 1924   Date of Visit: 3/3/2017       Dear Dr. Sachin Finley Jr.:    Thank you for referring Orlando Tran to me for evaluation. Attached you will find relevant portions of my assessment and plan of care.    If you have questions, please do not hesitate to call me. I look forward to following Orlando Tran along with you.    Sincerely,    Jaxson Jennings MD    Enclosure  CC:  No Recipients    If you would like to receive this communication electronically, please contact [email or web address] or [phone] to request more information on [employer] Link access.    For providers and/or their staff who would like to refer a patient to [facility], please contact us through our one-stop-shop provider referral line, [facility] [place], at [phone].    If you feel you have received this communication in error or would no longer like to receive these types of communications, please e-mail [email or web address]

## 2017-03-03 NOTE — Clinical Note
Schedule Lupron ASAP once authorized- treatment plan is entered.  RTC 4 weeks after Lupron injection with labs (CBC,CMP,PSA), and to see me and Dr. Jennings.

## 2017-03-03 NOTE — PROGRESS NOTES
Subjective:       Patient ID: Orlando Tran Jr. is a 89 y.o. male.    Chief Complaint: Prostate Cancer    HPI   89 year old AA male, referred by Dr. Finley, for evaluation and management of prostate cancer. Pt has a long history of prostate cancer, son states it has been greater than 5 years. Last PSA was 316.6 on 2/5/17. Bone scan in February 2017 was negative for metastatic disease in the bones.  He was previously on LHRH agonist, pt has not been on it in a while. He was also taking Casodex but it appears to have been discontinued during his last hospitalization in February 2017 as it could have possibly caused some liver dysfunction.     He denies any mouth sores, nausea, vomiting, diarrhea, constipation, abdominal pain, weight loss or loss of appetite, chest pain, shortness of breath, leg swelling, fatigue, pain, headache, dizziness, or mood changes.    Her ECOG PS is 1-2 and he presents with his son, Ferny, to clinic.    Review of Systems   Constitutional: Negative for activity change, appetite change, fatigue, fever and unexpected weight change.   HENT: Negative for mouth sores, nosebleeds and trouble swallowing.    Eyes: Negative for discharge and visual disturbance.   Respiratory: Negative for cough, shortness of breath and wheezing.    Cardiovascular: Negative for chest pain and leg swelling.   Gastrointestinal: Negative for abdominal distention, abdominal pain, blood in stool, constipation, diarrhea, nausea and vomiting.   Genitourinary: Negative for decreased urine volume, difficulty urinating, frequency and hematuria.   Musculoskeletal: Negative for back pain.        Ambulatory with cane   Skin: Negative for color change and rash.   Neurological: Negative for weakness and headaches.   Hematological: Negative for adenopathy.   Psychiatric/Behavioral: Negative for sleep disturbance. The patient is not nervous/anxious.    All other systems reviewed and are negative.      Allergies:  Review of patient's  allergies indicates:   Allergen Reactions    No known drug allergies        Medications:  Current Outpatient Prescriptions   Medication Sig Dispense Refill    allopurinol (ZYLOPRIM) 100 MG tablet Take 1 tablet (100 mg total) by mouth Daily. 30 tablet 1    aspirin 81 mg Tab Take 1 tablet by mouth once daily.       carvedilol (COREG) 12.5 MG tablet Take 1 tablet (12.5 mg total) by mouth 2 (two) times daily with meals. 180 tablet 3    donepezil (ARICEPT) 5 MG tablet Take 1 tablet (5 mg total) by mouth once daily. 90 tablet 3    doxazosin (CARDURA) 2 MG tablet Take 1 tablet (2 mg total) by mouth every evening. 90 tablet 3    furosemide (LASIX) 40 MG tablet Take 1 tablet (40 mg total) by mouth 2 (two) times daily. 180 tablet 3    hydrALAZINE (APRESOLINE) 50 MG tablet Take 1 tablet (50 mg total) by mouth every 12 (twelve) hours. 180 tablet 3    isosorbide dinitrate (ISORDIL) 2.5 mg Subl Place 1 tablet (2.5 mg total) under the tongue 2 (two) times daily. 60 tablet 11    sodium chloride (SALINE NASAL) 0.65 % nasal spray 1 spray by Nasal route as needed for Congestion. 15 mL 12     No current facility-administered medications for this visit.        PMH:  Past Medical History:   Diagnosis Date    Chronic combined systolic and diastolic CHF (congestive heart failure) 7/24/2015    2-2017:  1 - Concentric LVH with mildly to moderately depressed left ventricular systolic function (EF 40-45%).    2 - Severe left atrial enlargement.    3 - Left ventricular diastolic dysfunction with increased LAP.    4 - AV sclerosis with mild aortic regurgitation.    5 - Mild to moderate mitral regurgitation.    6 - Mild tricuspid regurgitation.    7 - Pulmonary hypertension. The estimated PA     Chronic gout without tophus 7/24/2015    CKD (chronic kidney disease), stage III     Combined hyperlipidemia associated with type 2 diabetes mellitus     not currently on statin     Coronary artery disease involving native coronary artery  without angina pectoris 2015    s/p stents to LCF & RCA - patent       Dementia without behavioral disturbance 2015    Gout, chronic     Hypertension associated with diabetes     Left atrial enlargement 2017    Prostate cancer     S/P CABG x 1 01    3 v incl LIMA to LAD    Secondary pulmonary hypertension 2017    Venous insufficiency of leg - chronic 2014       PSH:  Past Surgical History:   Procedure Laterality Date    CORONARY ARTERY BYPASS GRAFT  01    CORONARY STENT PLACEMENT         FamHx:  Family History   Problem Relation Age of Onset    Heart disease Neg Hx     Heart attack Neg Hx     Hypertension Neg Hx        SocHx:  Social History     Social History    Marital status:      Spouse name: N/A    Number of children: N/A    Years of education: N/A     Occupational History    Not on file.     Social History Main Topics    Smoking status: Former Smoker     Quit date: 1989    Smokeless tobacco: Not on file    Alcohol use Yes      Comment:  of vodka every 10 days    Drug use: Not on file    Sexual activity: Not on file     Other Topics Concern    Not on file     Social History Narrative    2015: Wife  2015 from cancer.    Retired .       Objective:      Physical Exam   Constitutional: He appears well-developed.   Thin appearance   HENT:   Head: Normocephalic and atraumatic.   Eyes: Pupils are equal, round, and reactive to light.   Neck: No tracheal deviation present.   Cardiovascular: Normal rate, regular rhythm and normal heart sounds.    Pulmonary/Chest: Effort normal and breath sounds normal.   Abdominal: Soft. Bowel sounds are normal. He exhibits no distension.   Musculoskeletal:   Ambulatory with cane   Skin: Skin is dry.       Assessment:       1. Prostate cancer        Plan:       89 year old AA male, referred by Dr. Finley, for evaluation and management of prostate cancer. Pt has a long history of  prostate cancer, son states it has been greater than 5 years. Last PSA was 316.6 on 2/5/17. Bone scan in February 2017 was negative for metastatic disease in the bones. Previously on Casodex, now being held due to previous liver dysfunction noticed in February 2017 hospitalization. Discussed with patient and his accompanying son that we recommend restarting LHRH with Lupron. Will hold off with Casodex for now and see how his PSA responds to Lupron. Should PSA continue to rise, can discuss getting CT CAP.     Will get insurance authorization for Lupron and have him receive next week. Will see patient back in clinic 4 weeks after Lupron injection to check PSA.      Patient was also seen and examined by Dr. Jennings. Patient is in agreement with the proposed treatment plan. All questions were answered to the patient's satisfaction. Pt knows to call clinic if anything is needed before the next clinic visit.    Irene Chin, PERLA-C  Hematology and Medical Oncology        I have reviewed the notes, assessments, and/or procedures performed by the nurse practitioner, as above.  I have personally interviewed the patient at the beside, and rounded with the nurse practitioner. I formulated the plan of care.  I concur with her documentation of Orlando Tran Jr..  More than 60 mins were spent during this encounter, greater than 50% was spent in direct counseling and/or coordination of care.     Jaxson Jennings M.D., M.S.  Hematology/Oncology Attending  [facility]

## 2017-03-06 ENCOUNTER — TELEPHONE (OUTPATIENT)
Dept: ADMINISTRATIVE | Facility: OTHER | Age: 82
End: 2017-03-06

## 2017-03-06 NOTE — TELEPHONE ENCOUNTER
----- Message from Irene Chin NP sent at 3/3/2017 12:04 PM CST -----  Schedule Lupron ASAP once authorized- treatment plan is entered.    RTC 4 weeks after Lupron injection with labs (CBC,CMP,PSA), and to see me and Dr. Jeninngs.

## 2017-03-08 ENCOUNTER — LAB VISIT (OUTPATIENT)
Dept: LAB | Facility: HOSPITAL | Age: 82
End: 2017-03-08
Attending: INTERNAL MEDICINE
Payer: MEDICARE

## 2017-03-08 DIAGNOSIS — N18.30 CHRONIC KIDNEY DISEASE (CKD), STAGE III (MODERATE): ICD-10-CM

## 2017-03-08 LAB
ALBUMIN SERPL BCP-MCNC: 2.6 G/DL
ANION GAP SERPL CALC-SCNC: 6 MMOL/L
BUN SERPL-MCNC: 16 MG/DL
CALCIUM SERPL-MCNC: 9.3 MG/DL
CHLORIDE SERPL-SCNC: 102 MMOL/L
CO2 SERPL-SCNC: 26 MMOL/L
CREAT SERPL-MCNC: 1.6 MG/DL
EST. GFR  (AFRICAN AMERICAN): 42.6 ML/MIN/1.73 M^2
EST. GFR  (NON AFRICAN AMERICAN): 36.9 ML/MIN/1.73 M^2
GLUCOSE SERPL-MCNC: 96 MG/DL
PHOSPHATE SERPL-MCNC: 3.1 MG/DL
POTASSIUM SERPL-SCNC: 4.9 MMOL/L
SODIUM SERPL-SCNC: 134 MMOL/L

## 2017-03-08 PROCEDURE — 80069 RENAL FUNCTION PANEL: CPT

## 2017-03-08 PROCEDURE — 36415 COLL VENOUS BLD VENIPUNCTURE: CPT

## 2017-03-10 ENCOUNTER — OFFICE VISIT (OUTPATIENT)
Dept: NEUROLOGY | Facility: CLINIC | Age: 82
End: 2017-03-10
Payer: MEDICARE

## 2017-03-10 VITALS
DIASTOLIC BLOOD PRESSURE: 62 MMHG | WEIGHT: 164.31 LBS | HEIGHT: 65 IN | HEART RATE: 76 BPM | SYSTOLIC BLOOD PRESSURE: 148 MMHG | BODY MASS INDEX: 27.38 KG/M2

## 2017-03-10 DIAGNOSIS — E11.59 HYPERTENSION ASSOCIATED WITH DIABETES: Chronic | ICD-10-CM

## 2017-03-10 DIAGNOSIS — I50.42 CHRONIC COMBINED SYSTOLIC AND DIASTOLIC CHF (CONGESTIVE HEART FAILURE): Chronic | ICD-10-CM

## 2017-03-10 DIAGNOSIS — I15.2 HYPERTENSION ASSOCIATED WITH DIABETES: Chronic | ICD-10-CM

## 2017-03-10 DIAGNOSIS — F01.50 VASCULAR DEMENTIA WITHOUT BEHAVIORAL DISTURBANCE: Primary | ICD-10-CM

## 2017-03-10 PROCEDURE — 1159F MED LIST DOCD IN RCRD: CPT | Mod: S$GLB,,, | Performed by: PSYCHIATRY & NEUROLOGY

## 2017-03-10 PROCEDURE — 1160F RVW MEDS BY RX/DR IN RCRD: CPT | Mod: S$GLB,,, | Performed by: PSYCHIATRY & NEUROLOGY

## 2017-03-10 PROCEDURE — 1126F AMNT PAIN NOTED NONE PRSNT: CPT | Mod: S$GLB,,, | Performed by: PSYCHIATRY & NEUROLOGY

## 2017-03-10 PROCEDURE — 99999 PR PBB SHADOW E&M-EST. PATIENT-LVL III: CPT | Mod: PBBFAC,,, | Performed by: PSYCHIATRY & NEUROLOGY

## 2017-03-10 PROCEDURE — 99499 UNLISTED E&M SERVICE: CPT | Mod: S$GLB,,, | Performed by: PSYCHIATRY & NEUROLOGY

## 2017-03-10 PROCEDURE — 99204 OFFICE O/P NEW MOD 45 MIN: CPT | Mod: S$GLB,,, | Performed by: PSYCHIATRY & NEUROLOGY

## 2017-03-10 PROCEDURE — 1157F ADVNC CARE PLAN IN RCRD: CPT | Mod: S$GLB,,, | Performed by: PSYCHIATRY & NEUROLOGY

## 2017-03-10 NOTE — PATIENT INSTRUCTIONS
Discussed with patient and caregiver.  No medications changes or new medications prescribed.  The patient's cognition has gradually been progressive and at this time it does appear that he does need to have closer supervision as he cannot manage his own medications and tends to wander out of the house when unsupervised.  He has had falls related to his mild vision impairment, especially with ground is uneven.  Fall precautions discussed with caregiver.  Advised her to discuss my recommendations with his son.  Continue follow-up with his primary care physician.

## 2017-03-10 NOTE — PROGRESS NOTES
Subjective:       Patient ID: Orlando Tran Jr. is a 89 y.o. male.    Chief Complaint:  Dementia      History of Present Illness  HPI   This is a 89-year-old male who was referred for evaluation of progressive memory difficulties.  He has had a history of memory problems since 2010 and has been on Aricept 5 mg daily which he has tolerated well.  His caregiver was present today reports that over the past several months he has gradually declined and at times become irritable as he does not like to be told what to do.  He has been on citalopram some stabilization of his behavior.  She reports that he has significant difficulty with recall of recent information, tends to repeat himself and get confused very easily.  He lives alone and has a caregiver come to check on him about 2-3 days a week however it is apparent that he needs more supervision as he does not take his medications when he is alone and tends to wander out of the house especially at night.  His son lives next to him however he works long hours.  The patient has had a couple of falls in the last 1 month and on one occasion had a fracture of the right orbit.  A CT scan of the brain done at that time was otherwise unremarkable.  He was seen by neuro-ophthalmology, Dr. Fuentes.  At this time the patient denies any headaches or visual difficulties.  He has had gait difficulty related to osteoarthritis and uses a cane or walker.        Review of Systems  Review of Systems   Constitutional: Negative.    HENT: Negative.  Negative for hearing loss.    Eyes: Positive for visual disturbance.   Respiratory: Negative.  Negative for shortness of breath.    Cardiovascular: Negative.  Negative for chest pain and palpitations.   Gastrointestinal: Negative.    Endocrine: Negative.    Genitourinary: Negative.    Musculoskeletal: Positive for arthralgias and gait problem. Negative for back pain.   Skin: Negative.    Allergic/Immunologic: Negative.    Neurological: Negative  for dizziness, tremors, seizures, syncope, speech difficulty, weakness, numbness and headaches.   Hematological: Negative.    Psychiatric/Behavioral: Positive for confusion and decreased concentration. Negative for sleep disturbance.       Objective:      Neurologic Exam    Physical Exam   Constitutional: He appears well-developed and well-nourished.   HENT:   Head: Normocephalic and atraumatic.   Right Ear: Hearing normal.   Left Ear: Hearing normal.   Eyes:   Fundus examination shows sharp disc margins.  Pupils are equal and reactive with EOM being full without nystagmus   Neck: Normal range of motion. Neck supple. Carotid bruit is not present.   Neurological: He is alert. He displays no atrophy (Motor examination grossly symmetrical) and normal reflexes (DTRs generally depressed but symmetrical). No cranial nerve deficit (Visual fields at bedside testing essentially normal.  No facial asymmetry noted with facial movements and sensory exam being normal/symmetrical.  Corneals/gag reflexes normal.  Tongue & palate movements normal.  Hearing unimpaired.  Shoulder shrug was norm) or sensory deficit (rimary sensations are symmetrical). He exhibits normal muscle tone. Coordination (Clumsiness of fine motor movements bilaterally) and gait (gait is stable  though he needs a cane for stability) normal. He displays no Babinski's sign on the right side. He displays no Babinski's sign on the left side.   Mental status examination: The patient is awake and responsive and follows simple one-step commands.  He cannot give any adequate history.  He tends to minimize his problems.  Immediate recall is 0/3 after 1 minute.  He is not oriented to time but is oriented to place and person..  Decreased attention span and concentration.  Comprehension is unimpaired.  However cannot follow multiple step commands.  Affect is appropriate, mood was even.  No thought disorder is noted.         Assessment:        1. Vascular dementia without  behavioral disturbance    2. Hypertension associated with diabetes    3. Chronic combined systolic and diastolic CHF (congestive heart failure)            Plan:         Discussed with patient and caregiver.  No medications changes or new medications prescribed.  The patient's cognition has gradually been progressive and at this time it does appear that he does need to have closer supervision as he cannot manage his own medications and tends to wander out of the house when unsupervised.  He has had falls related to his mild vision impairment, especially with ground is uneven.  Fall precautions discussed with caregiver.  Advised her to discuss my recommendations with his son.  Continue follow-up with his primary care physician.

## 2017-03-13 ENCOUNTER — OFFICE VISIT (OUTPATIENT)
Dept: INTERNAL MEDICINE | Facility: CLINIC | Age: 82
End: 2017-03-13
Payer: MEDICARE

## 2017-03-13 ENCOUNTER — OFFICE VISIT (OUTPATIENT)
Dept: NEPHROLOGY | Facility: CLINIC | Age: 82
End: 2017-03-13
Payer: MEDICARE

## 2017-03-13 VITALS
HEART RATE: 75 BPM | WEIGHT: 164.69 LBS | SYSTOLIC BLOOD PRESSURE: 140 MMHG | RESPIRATION RATE: 18 BRPM | HEIGHT: 66 IN | DIASTOLIC BLOOD PRESSURE: 58 MMHG | BODY MASS INDEX: 26.47 KG/M2 | OXYGEN SATURATION: 98 %

## 2017-03-13 VITALS
SYSTOLIC BLOOD PRESSURE: 148 MMHG | DIASTOLIC BLOOD PRESSURE: 62 MMHG | OXYGEN SATURATION: 99 % | BODY MASS INDEX: 26.37 KG/M2 | HEART RATE: 69 BPM | WEIGHT: 163.38 LBS

## 2017-03-13 DIAGNOSIS — E11.59 HYPERTENSION ASSOCIATED WITH DIABETES: Chronic | ICD-10-CM

## 2017-03-13 DIAGNOSIS — Z80.42 FAMILY HX OF PROSTATE CANCER: ICD-10-CM

## 2017-03-13 DIAGNOSIS — M1A.9XX0 CHRONIC GOUT WITHOUT TOPHUS, UNSPECIFIED CAUSE, UNSPECIFIED SITE: ICD-10-CM

## 2017-03-13 DIAGNOSIS — C61 PROSTATE CANCER: ICD-10-CM

## 2017-03-13 DIAGNOSIS — I50.42 CHRONIC COMBINED SYSTOLIC AND DIASTOLIC CHF (CONGESTIVE HEART FAILURE): Chronic | ICD-10-CM

## 2017-03-13 DIAGNOSIS — I50.40 COMBINED SYSTOLIC AND DIASTOLIC HEART FAILURE, UNSPECIFIED HEART FAILURE CHRONICITY: ICD-10-CM

## 2017-03-13 DIAGNOSIS — R80.9 PROTEINURIA, UNSPECIFIED TYPE: ICD-10-CM

## 2017-03-13 DIAGNOSIS — I15.2 HYPERTENSION ASSOCIATED WITH DIABETES: Chronic | ICD-10-CM

## 2017-03-13 DIAGNOSIS — N18.30 CHRONIC KIDNEY DISEASE (CKD), STAGE III (MODERATE): Primary | ICD-10-CM

## 2017-03-13 DIAGNOSIS — E78.2 COMBINED HYPERLIPIDEMIA ASSOCIATED WITH TYPE 2 DIABETES MELLITUS: ICD-10-CM

## 2017-03-13 DIAGNOSIS — E11.9 TYPE 2 DIABETES MELLITUS WITHOUT RETINOPATHY: ICD-10-CM

## 2017-03-13 DIAGNOSIS — I77.9 BILATERAL CAROTID ARTERY DISEASE: ICD-10-CM

## 2017-03-13 DIAGNOSIS — I25.708 CORONARY ARTERY DISEASE OF BYPASS GRAFT OF NATIVE HEART WITH STABLE ANGINA PECTORIS: ICD-10-CM

## 2017-03-13 DIAGNOSIS — Z76.0 PRESCRIPTION REFILL: ICD-10-CM

## 2017-03-13 DIAGNOSIS — F01.50 VASCULAR DEMENTIA WITHOUT BEHAVIORAL DISTURBANCE: ICD-10-CM

## 2017-03-13 DIAGNOSIS — E11.69 COMBINED HYPERLIPIDEMIA ASSOCIATED WITH TYPE 2 DIABETES MELLITUS: ICD-10-CM

## 2017-03-13 DIAGNOSIS — E11.21 DIABETIC NEPHROPATHY ASSOCIATED WITH TYPE 2 DIABETES MELLITUS: ICD-10-CM

## 2017-03-13 DIAGNOSIS — R79.89 ELEVATED LIVER FUNCTION TESTS: ICD-10-CM

## 2017-03-13 PROCEDURE — 1126F AMNT PAIN NOTED NONE PRSNT: CPT | Mod: S$GLB,,, | Performed by: INTERNAL MEDICINE

## 2017-03-13 PROCEDURE — 1159F MED LIST DOCD IN RCRD: CPT | Mod: S$GLB,,, | Performed by: INTERNAL MEDICINE

## 2017-03-13 PROCEDURE — 1157F ADVNC CARE PLAN IN RCRD: CPT | Mod: S$GLB,,, | Performed by: INTERNAL MEDICINE

## 2017-03-13 PROCEDURE — 99999 PR PBB SHADOW E&M-EST. PATIENT-LVL III: CPT | Mod: PBBFAC,,, | Performed by: INTERNAL MEDICINE

## 2017-03-13 PROCEDURE — 1160F RVW MEDS BY RX/DR IN RCRD: CPT | Mod: S$GLB,,, | Performed by: INTERNAL MEDICINE

## 2017-03-13 PROCEDURE — 99499 UNLISTED E&M SERVICE: CPT | Mod: S$GLB,,, | Performed by: INTERNAL MEDICINE

## 2017-03-13 PROCEDURE — 99203 OFFICE O/P NEW LOW 30 MIN: CPT | Mod: S$GLB,,, | Performed by: INTERNAL MEDICINE

## 2017-03-13 PROCEDURE — 99215 OFFICE O/P EST HI 40 MIN: CPT | Mod: S$GLB,,, | Performed by: INTERNAL MEDICINE

## 2017-03-13 RX ORDER — HYDRALAZINE HYDROCHLORIDE 50 MG/1
50 TABLET, FILM COATED ORAL EVERY 12 HOURS
Qty: 180 TABLET | Refills: 3 | Status: SHIPPED | OUTPATIENT
Start: 2017-03-13 | End: 2018-03-13

## 2017-03-13 NOTE — LETTER
March 14, 2017      Jordon Spence MD  [address]  [place] [address]           [facility] - Nephrology  [address]  [facility] [address]  Phone: [phone]  Fax: [phone]          Patient: Orlando Tran Jr.   MR Number: [id number]   YOB: 1924   Date of Visit: 3/13/2017       Dear Dr. Jordon Spence:    Thank you for referring Orlando Tran to me for evaluation. Attached you will find relevant portions of my assessment and plan of care.    If you have questions, please do not hesitate to call me. I look forward to following Orlando Tran along with you.    Sincerely,    Gregg Self MD    Enclosure  CC:  No Recipients    If you would like to receive this communication electronically, please contact [email or web address] or [phone] to request more information on [employer] Link access.    For providers and/or their staff who would like to refer a patient to [facility], please contact us through our one-stop-shop provider referral line, [facility] at [phone].    If you feel you have received this communication in error or would no longer like to receive these types of communications, please e-mail [email or web address]

## 2017-03-13 NOTE — PATIENT INSTRUCTIONS
TODAY:  - discontinue isosorbide dinitrate  - more assistance in the home from the family: medications, accompanyment  - cut back on consults

## 2017-03-13 NOTE — ASSESSMENT & PLAN NOTE
Echo 2/2017: LVH, low EF, LA enlargement. Frequent acute exacerbations  · Continue scheduled lasix  · Advised to discontinue cardiology follow-up, as no changes in management

## 2017-03-13 NOTE — PROGRESS NOTES
Primary Care Provider Appointment    Subjective:      Patient ID: Orlando Tran Jr. is a 89 y.o. male.    Chief Complaint: Establish Care (concerned about overall health ); Chills (2+ years , Pt always cold); Hearing Problem (2+ years , difficulty hearing ); Hands (PT is having issues with feeling ); and Home Care (Pt needs round the clock care / Asst.)  Patient's son is with him at appointment, his primary caregiver is his sister-in-law. Previously seen by Dr Spence. Was admitted on 17 for encephalopathy and LE weakness possibly had ischemic hepatitis. Patient was diuresed and lost 8#.    Per his son, he sometimes misses doses but family does not pressure him in order to avoid his combative behavior. His son lives nextdoor to him, but patient continues to live alone. Per Neuro note, he only has home visits 2-3 days weekly. He has increased number of falls recently, with a fracture of the orbit and no residual effects.    Patient seen by Neuro on 3/10/17 with no medication changes, but recommendations to increase in-home care.     Son reports he has restlessness at night with decreased appetite. He receives meals-on-wheels.     Mr Tran has 4 children, his son, Ferny, is only child in this area. His sister-in-law does daily visits. The other son is retired in [place], 2 daughters (one in [place] and another in [place], [place]). The [place] daughter comes every other weekend. Their mother  2 years ago of pancreatic cancer, there is conflict with the other siblings due to patient's aggressive and combative behavior.    Patient likely not compliant with twice daily dosing of BP meds.    Social History     Social History    Marital status:      Spouse name: N/A    Number of children: N/A    Years of education: N/A     Occupational History    Not on file.     Social History Main Topics    Smoking status: Former Smoker     Quit date: 1989    Smokeless tobacco: Not on file    Alcohol use Yes      Comment:   of vodka every 10 days    Drug use: No    Sexual activity: Not Currently     Partners: Female     Other Topics Concern    Not on file     Social History Narrative    2015: Wife  2015 from cancer.    Retired .       Past Surgical History:   Procedure Laterality Date    CORONARY ARTERY BYPASS GRAFT  01    CORONARY STENT PLACEMENT         Past Medical History:   Diagnosis Date    Chronic combined systolic and diastolic CHF (congestive heart failure) 2015    2-2017:  1 - Concentric LVH with mildly to moderately depressed left ventricular systolic function (EF 40-45%).    2 - Severe left atrial enlargement.    3 - Left ventricular diastolic dysfunction with increased LAP.    4 - AV sclerosis with mild aortic regurgitation.    5 - Mild to moderate mitral regurgitation.    6 - Mild tricuspid regurgitation.    7 - Pulmonary hypertension. The estimated PA     Chronic gout without tophus 2015    CKD (chronic kidney disease), stage III     Combined hyperlipidemia associated with type 2 diabetes mellitus     not currently on statin     Coronary artery disease involving native coronary artery without angina pectoris 2015    s/p stents to LCF & RCA - patent       Dementia without behavioral disturbance 2015    Gout, chronic     Hypertension associated with diabetes     Left atrial enlargement 2017    Prostate cancer     S/P CABG x 1 01    3 v incl LIMA to LAD    Secondary pulmonary hypertension 2017    Venous insufficiency of leg - chronic 2014         Family History   Problem Relation Age of Onset    Diabetes Father     Colon cancer Sister     No Known Problems Daughter     No Known Problems Son     No Known Problems Daughter     No Known Problems Son     Heart disease Neg Hx     Heart attack Neg Hx     Hypertension Neg Hx        Review of Systems   Constitutional: Positive for activity change, appetite change and fatigue.  "  HENT: Positive for hearing loss.    Cardiovascular: Positive for leg swelling.   Musculoskeletal: Positive for gait problem and neck stiffness.   Neurological: Positive for speech difficulty and weakness.   Hematological: Negative for adenopathy.   Psychiatric/Behavioral: Positive for agitation, behavioral problems, confusion, decreased concentration, dysphoric mood and sleep disturbance.       Objective:   BP (!) 140/58 (BP Location: Left arm, Patient Position: Sitting, BP Method: Manual)  Pulse 75  Resp 18  Ht 5' 6" (1.676 m)  Wt 74.7 kg (164 lb 10.9 oz)  SpO2 98%  BMI 26.58 kg/m2    Physical Exam   Constitutional: He appears well-developed.   malnutrition   HENT:   Head: Normocephalic.   Right Ear: External ear normal.   Left Ear: External ear normal.   Mouth/Throat: Oropharynx is clear and moist.   Eyes: Conjunctivae and EOM are normal.   Arcus senilis   Neck: Normal range of motion.   Cardiovascular: Normal rate, regular rhythm and normal heart sounds.    Pulmonary/Chest: Effort normal and breath sounds normal.   Abdominal: Soft.   Musculoskeletal: Normal range of motion.   Skin: Skin is warm.   Psychiatric:   Poor eye contact  Did not talk during exam             Lab Results   Component Value Date    WBC 4.32 02/07/2017    HGB 12.0 (L) 02/07/2017    HCT 37.5 (L) 02/07/2017     02/07/2017    CHOL 112 (L) 01/13/2017    TRIG 80 01/13/2017    HDL 43 01/13/2017    ALT 12 02/27/2017    AST 17 02/27/2017     (L) 03/08/2017    K 4.9 03/08/2017     03/08/2017    CREATININE 1.6 (H) 03/08/2017    BUN 16 03/08/2017    CO2 26 03/08/2017    TSH 1.477 01/14/2015    .74 (H) 10/21/2011    INR 1.3 (H) 02/07/2017    HGBA1C 5.7 01/13/2017       Current Outpatient Prescriptions on File Prior to Visit   Medication Sig Dispense Refill    allopurinol (ZYLOPRIM) 100 MG tablet Take 1 tablet (100 mg total) by mouth Daily. 30 tablet 1    aspirin 81 mg Tab Take 1 tablet by mouth once daily.       " carvedilol (COREG) 12.5 MG tablet Take 1 tablet (12.5 mg total) by mouth 2 (two) times daily with meals. 180 tablet 3    donepezil (ARICEPT) 5 MG tablet Take 1 tablet (5 mg total) by mouth once daily. 90 tablet 3    doxazosin (CARDURA) 2 MG tablet Take 1 tablet (2 mg total) by mouth every evening. 90 tablet 3    furosemide (LASIX) 40 MG tablet Take 1 tablet (40 mg total) by mouth 2 (two) times daily. 180 tablet 3    [DISCONTINUED] hydrALAZINE (APRESOLINE) 50 MG tablet Take 1 tablet (50 mg total) by mouth every 12 (twelve) hours. 180 tablet 3    [DISCONTINUED] isosorbide dinitrate (ISORDIL) 2.5 mg Subl Place 1 tablet (2.5 mg total) under the tongue 2 (two) times daily. 60 tablet 11    sodium chloride (SALINE NASAL) 0.65 % nasal spray 1 spray by Nasal route as needed for Congestion. 15 mL 12     No current facility-administered medications on file prior to visit.          Assessment:   89 y.o. male with multiple co-morbid illnesses here to establish care with new PCP and continue work-up of chronic issues notably HTN, HF, dementia, CKD.     Plan:     Problem List Items Addressed This Visit        Neuro    Vascular dementia without behavioral disturbance     Recent encephalopathy in 2/2017  · Continue donepezil            Pulmonary    Secondary pulmonary hypertension (Chronic)     Seen on echo, PAP 55mmHg  · BP control  · Monitor clinically            Cardiac    Hypertension associated with diabetes (Chronic)     BP controlled, questionable compliance, recently encephalopathic episode  · Continue coreg, hydralazine  · Lasix for HF  · Patient nont tolerating isosorbide dinitrate  · discontinue  · Continue ASA, hold statin         Chronic combined systolic and diastolic CHF (congestive heart failure) (Chronic)     Echo 2/2017: LVH, low EF, LA enlargement. Frequent acute exacerbations  · Continue scheduled lasix  · Advised to discontinue cardiology follow-up, as no changes in management         Coronary artery  disease of bypass graft with stable angina pectoris    Bilateral carotid artery disease     Seen on carotid us from 02/03/17  · Continue ASA  · Discontinue statin due to hepatotoxicity            Renal    Uncontrolled secondary diabetes mellitus with stage 2 CKD (GFR 60-89)     GFR 42, A1c 5.7. Followed by nephrology  · Advised to complete follow-up with nephro, then continue monitoring with PCP  · Avoid nephrotoxic meds         Prostate cancer     Patient refusing treatment, PET neg 2/2017  · No monitoring warranted            Endocrine    Combined hyperlipidemia associated with type 2 diabetes mellitus     A1c 5.7, hepatic injury in 2/2017  · Discontinue statin         Type 2 diabetes mellitus without retinopathy     Recent history of orbital fracture, with no residual effects, no DR  · Clinical monitoring            Other    Chronic gout without tophus     Previously on allopurinol 300, decreased to 100mg due to liver injury and CKD  · Continue allopurinol 100mg  · monitor         Elevated liver function tests     Statin discontinued. Allopurinol decreased to 100 mg daily. Casodex held.  · monitor           Other Visit Diagnoses     Prescription refill        Relevant Medications    hydrALAZINE (APRESOLINE) 50 MG tablet          Health Maintenance       Date Due Completion Date    Zoster Vaccine 12/27/1984 ---    Eye Exam 6/27/2017 6/27/2016    Hemoglobin A1c 7/13/2017 1/13/2017    Pneumococcal (65+) (2 of 2 - PPSV23) 1/13/2018 1/13/2017    Lipid Panel 1/13/2018 1/13/2017    Foot Exam 2/3/2018 2/3/2017 (Done)    Override on 2/3/2017: Done    PROSTATE-SPECIFIC ANTIGEN 2/5/2018 2/5/2017    Urine Microalbumin 3/8/2018 3/8/2017    TETANUS VACCINE 2/27/2027 2/27/2017          Return in about 6 weeks (around 4/24/2017) for established patient follow-up. One hour spent with this patient today, half of that in counseling.    Mandie Sales MD/MPH  Internal Medicine  [facility]  [facility]  [phone]

## 2017-03-13 NOTE — ASSESSMENT & PLAN NOTE
Previously on allopurinol 300, decreased to 100mg due to liver injury and CKD  · Continue allopurinol 100mg  · monitor

## 2017-03-13 NOTE — ASSESSMENT & PLAN NOTE
GFR 42, A1c 5.7. Followed by nephrology  · Advised to complete follow-up with nephro, then continue monitoring with PCP  · Avoid nephrotoxic meds

## 2017-03-13 NOTE — PROGRESS NOTES
Patient is here today for follow up evaluation of CKD III. Last seen in renal office 2/12/14. Baseline Cr 1.4-1.6 mg/dl. His most recent lab(3/8/17) Cr 1.6 mg/dl; eGFR; 43 ml/min; potassium 4.9 mmol/L There is a hx of diabetes,heart failure and prostatic CA. Today he has no new complaints    ROS;  Mood and Affect; Appropriate  Otherwise non-contributory    Exam  Elderly gen tleman; no acute distress; oriented x 3  HEENT; WNL  CHEST; Clear P&A; no rales or rhonchi  HEART; RR; S1&S2 no murmur rub gallop  ABD; BS(+); non-tender; no organomegaly  EXT; (-)Edema    Impression;   CKD III Stable    Plan  Return Visit; 6 mo

## 2017-03-13 NOTE — ASSESSMENT & PLAN NOTE
BP controlled, questionable compliance, recently encephalopathic episode  · Continue coreg, hydralazine  · Lasix for HF  · Patient nont tolerating isosorbide dinitrate  · discontinue  · Continue ASA, hold statin

## 2017-03-13 NOTE — MR AVS SNAPSHOT
[facility] - [facility] Medicine  [address]  [facility] [address]  Phone: [phone]  Fax: [phone]                  Orlando Tran Jr.   3/13/2017 8:30 AM   Office Visit    Description:  Male : 1924   Provider:  Mandie Sales MD   Department:  [facility] - Internal Medicine           Reason for Visit     Establish Care     Chills     Hearing Problem     Hands     Home Care           Diagnoses this Visit        Comments    Chronic combined systolic and diastolic CHF (congestive heart failure)         Coronary artery disease of bypass graft of native heart with stable angina pectoris         Uncontrolled secondary diabetes mellitus with stage 2 CKD (GFR 60-89)         Combined hyperlipidemia associated with type 2 diabetes mellitus         Type 2 diabetes mellitus without retinopathy         Vascular dementia without behavioral disturbance         Hypertension associated with diabetes         Bilateral carotid artery disease         Prostate cancer         Secondary pulmonary hypertension         Elevated liver function tests         Prescription refill         Chronic gout without tophus, unspecified cause, unspecified site                To Do List           Future Appointments        Provider Department Dept Phone    3/13/2017 11:20 AM Gregg Self MD [facility] - Nephrology [phone]    2017 9:30 AM Mandie Sales MD [facility] - Internal Medicine [phone]      Goals (5 Years of Data)     None      Follow-Up and Disposition     Return in about 6 weeks (around 2017) for established patient follow-up.    Follow-up and Disposition History       These Medications        Disp Refills Start End    hydrALAZINE (APRESOLINE) 50 MG tablet 180 tablet 3 3/13/2017 3/13/2018    Take 1 tablet (50 mg total) by mouth every 12 (twelve) hours. - Oral    Pharmacy: [address] Ph #: [phone]         [facility]      "[facility] Care [facility] - 24/7 Assistance  Registered nurses in the [facility] provide clinical advisement, health education, appointment booking, and other advisory services.  Call for this free service at [phone].             Medications           Message regarding Medications     Verify the changes and/or additions to your medication regime listed below are the same as discussed with your clinician today.  If any of these changes or additions are incorrect, please notify your healthcare provider.        STOP taking these medications     isosorbide dinitrate (ISORDIL) 2.5 mg Subl Place 1 tablet (2.5 mg total) under the tongue 2 (two) times daily.           Verify that the below list of medications is an accurate representation of the medications you are currently taking.  If none reported, the list may be blank. If incorrect, please contact your healthcare provider. Carry this list with you in case of emergency.           Current Medications     allopurinol (ZYLOPRIM) 100 MG tablet Take 1 tablet (100 mg total) by mouth Daily.    aspirin 81 mg Tab Take 1 tablet by mouth once daily.     carvedilol (COREG) 12.5 MG tablet Take 1 tablet (12.5 mg total) by mouth 2 (two) times daily with meals.    donepezil (ARICEPT) 5 MG tablet Take 1 tablet (5 mg total) by mouth once daily.    doxazosin (CARDURA) 2 MG tablet Take 1 tablet (2 mg total) by mouth every evening.    furosemide (LASIX) 40 MG tablet Take 1 tablet (40 mg total) by mouth 2 (two) times daily.    hydrALAZINE (APRESOLINE) 50 MG tablet Take 1 tablet (50 mg total) by mouth every 12 (twelve) hours.    sodium chloride (SALINE NASAL) 0.65 % nasal spray 1 spray by Nasal route as needed for Congestion.           Clinical Reference Information           Your Vitals Were     BP Pulse Resp Height Weight SpO2    140/58 (BP Location: Left arm, Patient Position: Sitting, BP Method: Manual) 75 18 5' 6" (1.676 m) 74.7 kg (164 lb 10.9 oz) 98%    BMI    "             26.58 kg/m2          Blood Pressure          Most Recent Value    BP  (!)  140/58      Allergies as of 3/13/2017     No Known Drug Allergies      Immunizations Administered on Date of Encounter - 3/13/2017     None      Instructions    TODAY:  - discontinue isosorbide dinitrate  - more assistance in the home from the family: medications, accompanyment  - cut back on consults       Language Assistance Services     ATTENTION: Language assistance services are available, free of charge. Please call [phone].      ATENCIÓN: Si habla español, tiene a marcelo disposición servicios gratuitos de asistencia lingüística. Llame al [phone].     ROB Ý: N?u b?n nói Ti?ng Vi?t, có các d?ch v? h? tr? ngôn ng? mi?n phí dành cho b?n. G?i s? [phone].         [facility] complies with applicable Federal civil rights laws and does not discriminate on the basis of race, color, national origin, age, disability, or sex.

## 2017-03-14 ENCOUNTER — TELEPHONE (OUTPATIENT)
Dept: ADMINISTRATIVE | Facility: OTHER | Age: 82
End: 2017-03-14

## 2017-03-14 NOTE — TELEPHONE ENCOUNTER
----- Message from Irene Chin NP sent at 3/3/2017 12:04 PM CST -----  Schedule Lupron ASAP once authorized- treatment plan is entered.    RTC 4 weeks after Lupron injection with labs (CBC,CMP,PSA), and to see me and Dr. Jennings.

## 2017-03-15 ENCOUNTER — HOSPITAL ENCOUNTER (EMERGENCY)
Facility: HOSPITAL | Age: 82
Discharge: HOME | End: 2017-03-16
Attending: EMERGENCY MEDICINE
Payer: MEDICARE

## 2017-03-15 DIAGNOSIS — R53.1 WEAKNESS: ICD-10-CM

## 2017-03-15 DIAGNOSIS — N39.0 LOWER URINARY TRACT INFECTIOUS DISEASE: Primary | ICD-10-CM

## 2017-03-15 PROCEDURE — 99284 EMERGENCY DEPT VISIT MOD MDM: CPT | Mod: 25

## 2017-03-15 PROCEDURE — 96365 THER/PROPH/DIAG IV INF INIT: CPT

## 2017-03-15 PROCEDURE — 93005 ELECTROCARDIOGRAM TRACING: CPT

## 2017-03-15 NOTE — ED AVS SNAPSHOT
[facility]  [address]  [place] [address]               Orlando Tran Jr.   3/15/2017 11:21 PM   ED    Description:  Male : 1924   Department:  [facility]           Your Care was Coordinated By:     Provider Role From To    Dianna Helm MD Attending Provider 03/15/17 4581 --      Reason for Visit     Weakness           Diagnoses this Visit        Comments    Lower urinary tract infectious disease    -  Primary     Weakness           ED Disposition     ED Disposition Condition Comment    Discharge             To Do List           Follow-up Information     Follow up with Mandie Sales MD In 1 week(s).    Specialty:  Internal Medicine    Contact information:    Zack SAMANTHA TANI  [facility] [id number] [phone]         These Medications        Disp Refills Start End    ciprofloxacin HCl (CIPRO) 500 MG tablet 20 tablet 0 3/16/2017 3/26/2017    Take 1 tablet (500 mg total) by mouth 2 (two) times daily. - Oral    Pharmacy: [address] Ph #: [phone]         [facility]     [facility] -  Assistance  Registered nurses in the [facility] provide clinical advisement, health education, appointment booking, and other advisory services.  Call for this free service at [phone].             Medications           Message regarding Medications     Verify the changes and/or additions to your medication regime listed below are the same as discussed with your clinician today.  If any of these changes or additions are incorrect, please notify your healthcare provider.        START taking these NEW medications        Refills    ciprofloxacin HCl (CIPRO) 500 MG tablet 0    Sig: Take 1 tablet (500 mg total) by mouth 2 (two) times daily.    Class: Print    Route: Oral      These medications were administered today        Dose Freq    cefTRIAXone (ROCEPHIN) 1 g in  dextrose 5 % 50 mL IVPB 1 g ED 1 Time    Sig: Inject 50 mLs (1 g total) into the vein ED 1 Time.    Class: Normal    Route: Intravenous           Verify that the below list of medications is an accurate representation of the medications you are currently taking.  If none reported, the list may be blank. If incorrect, please contact your healthcare provider. Carry this list with you in case of emergency.           Current Medications     allopurinol (ZYLOPRIM) 100 MG tablet Take 1 tablet (100 mg total) by mouth Daily.    aspirin 81 mg Tab Take 1 tablet by mouth once daily.     carvedilol (COREG) 12.5 MG tablet Take 1 tablet (12.5 mg total) by mouth 2 (two) times daily with meals.    cefTRIAXone (ROCEPHIN) 1 g in dextrose 5 % 50 mL IVPB Inject 50 mLs (1 g total) into the vein ED 1 Time.    ciprofloxacin HCl (CIPRO) 500 MG tablet Take 1 tablet (500 mg total) by mouth 2 (two) times daily.    donepezil (ARICEPT) 5 MG tablet Take 1 tablet (5 mg total) by mouth once daily.    doxazosin (CARDURA) 2 MG tablet Take 1 tablet (2 mg total) by mouth every evening.    furosemide (LASIX) 40 MG tablet Take 1 tablet (40 mg total) by mouth 2 (two) times daily.    hydrALAZINE (APRESOLINE) 50 MG tablet Take 1 tablet (50 mg total) by mouth every 12 (twelve) hours.    sodium chloride (SALINE NASAL) 0.65 % nasal spray 1 spray by Nasal route as needed for Congestion.           Clinical Reference Information           Your Vitals Were     BP Pulse Temp Resp SpO2       150/73 91 98.8 °F (37.1 °C) (Oral) 20 99%       Allergies as of 3/16/2017        Reactions    No Known Drug Allergies       Immunizations Administered on Date of Encounter - 3/16/2017     None      ED Micro, Lab, POCT     Start Ordered       Status Ordering Provider    03/16/17 0009 03/16/17 0009  CBC auto differential  STAT      Final result     03/16/17 0009 03/16/17 0009  Comprehensive metabolic panel  STAT      Final result     03/16/17 0009 03/16/17 0009  Urinalysis  STAT       Final result     03/16/17 0009 03/16/17 0009  Urinalysis Microscopic  Once      Final result       ED Imaging Orders     Start Ordered       Status Ordering Provider    03/16/17 0015 03/16/17 0009  X-Ray Chest 1 View  1 time imaging      Preliminary result     03/16/17 0009 03/16/17 0009    1 time imaging,   Status:  Canceled      Canceled         Discharge Instructions           Urinary Tract Infections in Men    Urinary tract infections (UTIs) are most often caused by bacteria (germs) that invade the urinary tract. The bacteria may come from outside the body. Or they may travel from the skin outside of rectum into the urethra. Pain in or around the urinary tract is a common symptom for most UTIs. But the only way to know for sure if you have a UTI is to have a urinalysis and urine culture.   Types of UTIs  · Cystitis: This is a bladder infection and is often linked to a blockage from an enlarged prostate. You may have an urgent or frequent need to urinate, and bloody urine. Treatment includes antibiotics and medications to relax or shrink the prostate. Sometimes, surgery is needed.  · Urethritis: This is an infection of the urethra. You may have a discharge from the urethra or burning when you urinate. You may also have pain in the urethra or penis. It is treated with antibiotics.  · Prostatitis: This is an inflammation or infection of the prostate. You may have an urgent or frequent need to urinate, fever, or burning when you urinate. Or you may have a tender prostate, or a vague feeling of pressure. Prostatitis is treated with a range of medications, depending on the cause.  · Pyelonephritis: This is a kidney infection. If not treated, it can be serious and damage your kidneys. In severe cases you may be hospitalized. You may have a fever and upper back pain.  Treating a UTI  · Medications: Most UTIs are treated with antibiotics. These kill the bacteria. The length of time you need to take them depends on  the type of infection. Take antibiotics exactly as directed until all of the medication is gone. If you don't, the infection may not go away and may become harder to treat. For certain types of UTIs, you may be given other medications to help treat your symptoms.  · Lifestyle changes: The lifestyle changes below will help get rid of your current infection. They may also help prevent future UTIs.  ¨ Drink plenty of fluids such as water, juice, or other caffeine-free drinks. This helps flush bacteria out of your system.  ¨ Empty your bladder when you feel the urge to urinate and before going to sleep. Urine that stays in your bladder promotes infection.  ¨ Use condoms during sex. These help prevent UTIs caused by sexually transmitted bacteria.  ¨ Keep follow-up appointments with your health care provider. He or she can may do tests to make sure the infection has cleared. If necessary, additional treatment can be started.  · Other treatment: Most UTIs respond to medication. But sometimes a procedure or surgery is needed. This can treat an enlarged prostate, or remove a kidney stone or other blockage. Surgery may also treat problems caused by scarring or long-term infections.  Date Last Reviewed: 9/8/2014  © 9252-9164 [employer]. [address] [place] [address]. All rights reserved. This information is not intended as a substitute for professional medical care. Always follow your healthcare professional's instructions.          Your Scheduled Appointments     Mar 22, 2017 10:30 AM CDT   Injection with INJECTION, [facility] INFUSION   [facility] ([facility])    [address] [facility] [address]   [phone]            Apr 19, 2017  9:30 AM CDT   Non-Fasting Lab with LAB, HEMONC CANCER BLDG   [facility] ([facility])    [address]  [place] [address]   [phone]            Apr 19, 2017 10:30 AM CDT   Established  Patient Visit with MD Murali Noel - Hematology Oncology ([facility])    [address] [facility] [address]   [phone]            Apr 19, 2017 11:15 AM CDT   Injection with INJECTION, [facility] INFUSION   [facility] ([facility])    [address]  [facility] [address]   [phone]            Apr 24, 2017  9:30 AM CDT   Established Patient Visit with Mandie Sales MD   [facility] - Internal Medicine ([facility])    [address]  [place] [id number] [phone]               [facility] complies with applicable Federal civil rights laws and does not discriminate on the basis of race, color, national origin, age, disability, or sex.        Language Assistance Services     ATTENTION: Language assistance services are available, free of charge. Please call [phone].      ATENCIÓN: Si habla narda, tiene a marcelo disposición servicios gratuitos de asistencia lingüística. Llame al [phone].     ROB Ý: N?u b?n nói Ti?ng Vi?t, có các d?ch v? h? tr? ngôn ng? mi?n phí florence cho b?n. G?i s? [phone].

## 2017-03-16 VITALS
RESPIRATION RATE: 21 BRPM | HEART RATE: 93 BPM | TEMPERATURE: 99 F | DIASTOLIC BLOOD PRESSURE: 73 MMHG | OXYGEN SATURATION: 99 % | SYSTOLIC BLOOD PRESSURE: 150 MMHG

## 2017-03-16 LAB
ALBUMIN SERPL BCP-MCNC: 3.5 G/DL
ALP SERPL-CCNC: 184 IU/L
ALT SERPL W/O P-5'-P-CCNC: 21 IU/L
ANION GAP SERPL CALC-SCNC: 10 MMOL/L
AST SERPL-CCNC: 37 IU/L
BACTERIA #/AREA URNS AUTO: ABNORMAL /HPF
BASOPHILS # BLD AUTO: 0.04 K/UL
BASOPHILS NFR BLD: 1 %
BILIRUB SERPL-MCNC: 1.7 MG/DL
BILIRUB UR QL STRIP: ABNORMAL
BUN SERPL-MCNC: 19 MG/DL
CALCIUM SERPL-MCNC: 9.3 MG/DL
CHLORIDE SERPL-SCNC: 99 MMOL/L
CLARITY UR REFRACT.AUTO: CLEAR
CO2 SERPL-SCNC: 26 MMOL/L
COLOR UR AUTO: YELLOW
CREAT SERPL-MCNC: 1.6 MG/DL
DIFFERENTIAL METHOD: ABNORMAL
EOSINOPHIL # BLD AUTO: 0.1 K/UL
EOSINOPHIL NFR BLD: 1.3 %
ERYTHROCYTE [DISTWIDTH] IN BLOOD BY AUTOMATED COUNT: 14.6 %
EST. GFR  (AFRICAN AMERICAN): 42.6 ML/MIN/1.73 M^2
EST. GFR  (NON AFRICAN AMERICAN): 36.9 ML/MIN/1.73 M^2
GLUCOSE SERPL-MCNC: 124 MG/DL
GLUCOSE UR QL STRIP: ABNORMAL
HCT VFR BLD AUTO: 37.6 %
HGB BLD-MCNC: 12.2 G/DL
HGB UR QL STRIP: ABNORMAL
HYALINE CASTS UR QL AUTO: 0 /LPF
KETONES UR QL STRIP: ABNORMAL
LEUKOCYTE ESTERASE UR QL STRIP: ABNORMAL
LYMPHOCYTES # BLD AUTO: 1.1 K/UL
LYMPHOCYTES NFR BLD: 28.9 %
MCH RBC QN AUTO: 28.5 PG
MCHC RBC AUTO-ENTMCNC: 32.4 %
MCV RBC AUTO: 88 FL
MICROSCOPIC COMMENT: ABNORMAL
MONOCYTES # BLD AUTO: 0.4 K/UL
MONOCYTES NFR BLD: 8.9 %
NEUTROPHILS # BLD AUTO: 2.4 K/UL
NEUTROPHILS NFR BLD: 59.9 %
NITRITE UR QL STRIP: NEGATIVE
PH UR STRIP: 5 [PH] (ref 5–8)
PLATELET # BLD AUTO: 220 K/UL
PMV BLD AUTO: 10.2 FL
POTASSIUM SERPL-SCNC: 4.5 MMOL/L
PROT SERPL-MCNC: 7.6 G/DL
PROT UR QL STRIP: ABNORMAL
RBC # BLD AUTO: 4.28 M/UL
RBC #/AREA URNS AUTO: 4 /HPF (ref 0–4)
SODIUM SERPL-SCNC: 135 MMOL/L
SP GR UR STRIP: 1.02 (ref 1–1.03)
URN SPEC COLLECT METH UR: ABNORMAL
UROBILINOGEN UR STRIP-ACNC: >=8 EU/DL
WBC # BLD AUTO: 3.95 K/UL
WBC #/AREA URNS AUTO: 2 /HPF (ref 0–5)

## 2017-03-16 PROCEDURE — 81000 URINALYSIS NONAUTO W/SCOPE: CPT

## 2017-03-16 PROCEDURE — 80053 COMPREHEN METABOLIC PANEL: CPT

## 2017-03-16 PROCEDURE — 85025 COMPLETE CBC W/AUTO DIFF WBC: CPT

## 2017-03-16 PROCEDURE — 63600175 PHARM REV CODE 636 W HCPCS: Performed by: EMERGENCY MEDICINE

## 2017-03-16 RX ORDER — CIPROFLOXACIN 500 MG/1
500 TABLET ORAL 2 TIMES DAILY
Qty: 20 TABLET | Refills: 0 | Status: SHIPPED | OUTPATIENT
Start: 2017-03-16 | End: 2017-03-16 | Stop reason: SDUPTHER

## 2017-03-16 RX ADMIN — CEFTRIAXONE 1 G: 1 INJECTION, SOLUTION INTRAVENOUS at 02:03

## 2017-03-16 NOTE — ED PROVIDER NOTES
Encounter Date: 3/15/2017       History     Chief Complaint   Patient presents with    Weakness     Family states they found patient slumped on couch, awake and alert to self when EMS arrived; patient has hx of dementia, is normally very confused.  No c/o pain or discomfort.      Review of patient's allergies indicates:   Allergen Reactions    No known drug allergies      HPI Comments: Patient was found in his son's van slumped over the steering wheel sleeping. The daughter states thathe was arousable butseemed to be more difficult arousability normal.    Patient is a 89 y.o. male presenting with the following complaint: general illness. The history is provided by the patient, the EMS personnel and a caregiver.   General Illness    The current episode started just prior to arrival. The problem occurs continuously. The problem has been unchanged. The pain is at a severity of 0/10. Nothing relieves the symptoms. Nothing aggravates the symptoms. Pertinent negatives include no fever, no double vision, no photophobia, no abdominal pain, no constipation, no diarrhea, no nausea, no vomiting, no congestion, no rhinorrhea, no muscle aches, no cough, no shortness of breath, no URI, no wheezing, no pain and no eye redness.     Past Medical History:   Diagnosis Date    Chronic combined systolic and diastolic CHF (congestive heart failure) 7/24/2015    2-2017:  1 - Concentric LVH with mildly to moderately depressed left ventricular systolic function (EF 40-45%).    2 - Severe left atrial enlargement.    3 - Left ventricular diastolic dysfunction with increased LAP.    4 - AV sclerosis with mild aortic regurgitation.    5 - Mild to moderate mitral regurgitation.    6 - Mild tricuspid regurgitation.    7 - Pulmonary hypertension. 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    3 v incl LIMA to LAD    Secondary pulmonary hypertension 2/27/2017    Venous insufficiency of leg - chronic 9/9/2014     Past Surgical History:   Procedure Laterality Date    CORONARY ARTERY BYPASS GRAFT  12-13-01    CORONARY STENT PLACEMENT       Family History   Problem Relation Age of Onset    Diabetes Father     Colon cancer Sister     No Known Problems Daughter     No Known Problems Son     No Known Problems Daughter     No Known Problems Son     Heart disease Neg Hx     Heart attack Neg Hx     Hypertension Neg Hx      Social History   Substance Use Topics    Smoking status: Former Smoker     Quit date: 11/14/1989    Smokeless tobacco: None    Alcohol use Yes      Comment: 1/5th of vodka every 10 days     Review of Systems   Constitutional: Negative for fever.   HENT: Negative for congestion and rhinorrhea.    Eyes: Negative for double vision, photophobia, pain and redness.   Respiratory: Negative for cough, shortness of breath and wheezing.    Gastrointestinal: Negative for abdominal pain, constipation, diarrhea, nausea and vomiting.   Neurological: Positive for weakness. Negative for facial asymmetry.   All other systems reviewed and are negative.      Physical Exam   Initial Vitals   BP Pulse Resp Temp SpO2   03/15/17 2328 03/15/17 2328 03/15/17 2328 03/15/17 2328 03/15/17 2328   150/73 95 20 98.8 °F (37.1 °C) 99 %     Physical Exam    Nursing note and vitals reviewed.  Constitutional: He appears well-developed and well-nourished.   HENT:   Head: Normocephalic and atraumatic.   Eyes: EOM are normal.   Cardiovascular: Normal rate, regular rhythm, normal  heart sounds and intact distal pulses.   Pulmonary/Chest: Breath sounds normal.   Abdominal: Soft.   Musculoskeletal: Normal range of motion.   Neurological: He is alert and oriented to person, place, and time.   Skin: Skin is warm and dry.   Psychiatric: He has a normal mood and affect. His behavior is normal. Judgment and thought content normal.         ED Course   Procedures  Labs Reviewed   CBC W/ AUTO DIFFERENTIAL - Abnormal; Notable for the following:        Result Value    RBC 4.28 (*)     Hemoglobin 12.2 (*)     Hematocrit 37.6 (*)     RDW 14.6 (*)     All other components within normal limits   COMPREHENSIVE METABOLIC PANEL - Abnormal; Notable for the following:     Sodium 135 (*)     Glucose 124 (*)     Creatinine 1.60 (*)     Total Bilirubin 1.7 (*)     Alkaline Phosphatase 184 (*)     eGFR if  42.6 (*)     eGFR if non  36.9 (*)     All other components within normal limits   URINALYSIS - Abnormal; Notable for the following:     Protein, UA 2+ (*)     Glucose, UA 1+ (*)     Ketones, UA 1+ (*)     Bilirubin (UA) 1+ (*)     Occult Blood UA 3+ (*)     Urobilinogen, UA >=8.0 (*)     Leukocytes, UA 1+ (*)     All other components within normal limits   URINALYSIS MICROSCOPIC - Abnormal; Notable for the following:     Bacteria, UA Few (*)     All other components within normal limits             Medical Decision Making:   Clinical Tests:   Lab Tests: Ordered and Reviewed                   ED Course     Clinical Impression:   The primary encounter diagnosis was Lower urinary tract infectious disease. A diagnosis of Weakness was also pertinent to this visit.    Disposition:   Disposition: Discharged  Condition: Stable       Dianna Helm MD  03/16/17 0215

## 2017-03-16 NOTE — DISCHARGE INSTRUCTIONS
Urinary Tract Infections in Men    Urinary tract infections (UTIs) are most often caused by bacteria (germs) that invade the urinary tract. The bacteria may come from outside the body. Or they may travel from the skin outside of rectum into the urethra. Pain in or around the urinary tract is a common symptom for most UTIs. But the only way to know for sure if you have a UTI is to have a urinalysis and urine culture.   Types of UTIs  · Cystitis: This is a bladder infection and is often linked to a blockage from an enlarged prostate. You may have an urgent or frequent need to urinate, and bloody urine. Treatment includes antibiotics and medications to relax or shrink the prostate. Sometimes, surgery is needed.  · Urethritis: This is an infection of the urethra. You may have a discharge from the urethra or burning when you urinate. You may also have pain in the urethra or penis. It is treated with antibiotics.  · Prostatitis: This is an inflammation or infection of the prostate. You may have an urgent or frequent need to urinate, fever, or burning when you urinate. Or you may have a tender prostate, or a vague feeling of pressure. Prostatitis is treated with a range of medications, depending on the cause.  · Pyelonephritis: This is a kidney infection. If not treated, it can be serious and damage your kidneys. In severe cases you may be hospitalized. You may have a fever and upper back pain.  Treating a UTI  · Medications: Most UTIs are treated with antibiotics. These kill the bacteria. The length of time you need to take them depends on the type of infection. Take antibiotics exactly as directed until all of the medication is gone. If you don't, the infection may not go away and may become harder to treat. For certain types of UTIs, you may be given other medications to help treat your symptoms.  · Lifestyle changes: The lifestyle changes below will help get rid of your current infection. They may also help prevent  future UTIs.  ¨ Drink plenty of fluids such as water, juice, or other caffeine-free drinks. This helps flush bacteria out of your system.  ¨ Empty your bladder when you feel the urge to urinate and before going to sleep. Urine that stays in your bladder promotes infection.  ¨ Use condoms during sex. These help prevent UTIs caused by sexually transmitted bacteria.  ¨ Keep follow-up appointments with your health care provider. He or she can may do tests to make sure the infection has cleared. If necessary, additional treatment can be started.  · Other treatment: Most UTIs respond to medication. But sometimes a procedure or surgery is needed. This can treat an enlarged prostate, or remove a kidney stone or other blockage. Surgery may also treat problems caused by scarring or long-term infections.  Date Last Reviewed: 9/8/2014  © 3990-3256 [employer] [address] [place] [address]. All rights reserved. This information is not intended as a substitute for professional medical care. Always follow your healthcare professional's instructions.

## 2017-03-22 ENCOUNTER — TELEPHONE (OUTPATIENT)
Dept: INTERNAL MEDICINE | Facility: CLINIC | Age: 82
End: 2017-03-22

## 2017-03-22 NOTE — TELEPHONE ENCOUNTER
Patient's son called by PCP regarding lupron injection through oncology. Patient's son, Pawan (PoA) wants all oncology and injection appointments cancelled.     Thanks,  KJ

## 2017-03-27 ENCOUNTER — OFFICE VISIT (OUTPATIENT)
Dept: INTERNAL MEDICINE | Facility: CLINIC | Age: 82
End: 2017-03-27
Payer: MEDICARE

## 2017-03-27 VITALS
DIASTOLIC BLOOD PRESSURE: 52 MMHG | BODY MASS INDEX: 25.05 KG/M2 | HEIGHT: 66 IN | SYSTOLIC BLOOD PRESSURE: 122 MMHG | OXYGEN SATURATION: 97 % | HEART RATE: 57 BPM | WEIGHT: 155.88 LBS | RESPIRATION RATE: 18 BRPM

## 2017-03-27 DIAGNOSIS — F03.91 DEMENTIA WITH BEHAVIORAL DISTURBANCE, UNSPECIFIED DEMENTIA TYPE: Primary | ICD-10-CM

## 2017-03-27 DIAGNOSIS — C61 PROSTATE CANCER: ICD-10-CM

## 2017-03-27 DIAGNOSIS — N30.00 ACUTE CYSTITIS WITHOUT HEMATURIA: ICD-10-CM

## 2017-03-27 DIAGNOSIS — F01.50 VASCULAR DEMENTIA WITHOUT BEHAVIORAL DISTURBANCE: ICD-10-CM

## 2017-03-27 PROCEDURE — 1160F RVW MEDS BY RX/DR IN RCRD: CPT | Mod: S$GLB,,, | Performed by: INTERNAL MEDICINE

## 2017-03-27 PROCEDURE — 99499 UNLISTED E&M SERVICE: CPT | Mod: S$GLB,,, | Performed by: INTERNAL MEDICINE

## 2017-03-27 PROCEDURE — 99999 PR PBB SHADOW E&M-EST. PATIENT-LVL IV: CPT | Mod: PBBFAC,,, | Performed by: INTERNAL MEDICINE

## 2017-03-27 PROCEDURE — 1159F MED LIST DOCD IN RCRD: CPT | Mod: S$GLB,,, | Performed by: INTERNAL MEDICINE

## 2017-03-27 PROCEDURE — 99215 OFFICE O/P EST HI 40 MIN: CPT | Mod: S$GLB,,, | Performed by: INTERNAL MEDICINE

## 2017-03-27 PROCEDURE — 1125F AMNT PAIN NOTED PAIN PRSNT: CPT | Mod: S$GLB,,, | Performed by: INTERNAL MEDICINE

## 2017-03-27 PROCEDURE — 1157F ADVNC CARE PLAN IN RCRD: CPT | Mod: S$GLB,,, | Performed by: INTERNAL MEDICINE

## 2017-03-27 NOTE — PATIENT INSTRUCTIONS
TODAY:  - case management consult for hospice and nursing home options  - discontinue lupron injections

## 2017-03-27 NOTE — PROGRESS NOTES
Primary Care Provider Appointment    Subjective:      Patient ID: Orlando Tran Jr. is a 89 y.o. male.    Chief Complaint: Follow-up (Pt son is here to talk about cancer shots and everything with Pt health ) and Claudication (1+ month , pain= 9)  Patient seen in ED on 3/16/17 for AMS (found slumped in car) caused by UTI. Patient's son, Pawan (PoA) with him at appointment today. Son reports that patient has decreased appetite and activity, with worsening fatigue and weakness.     His son requested by phone to have lupron infusions discontinued on 3/22/17. Infusions previously performed q3-6 mos. He underwent NM bone scan in 2/2017 with no metastasis.    Patient's son interested in [facility] nursing [facility] admission and hospice. Pawan's wife's sister checks on patient daily. Patient is alone 3.5 hours daily. The family can not manage the responsibilities for caring for the patient in their current situation.    Pawan reports that patient is combative in the home, noncompliant with oral meds and discharged home health PT/OT due to refusal to participate.      Past Surgical History:   Procedure Laterality Date    CORONARY ARTERY BYPASS GRAFT  12-13-01    CORONARY STENT PLACEMENT         Past Medical History:   Diagnosis Date    Chronic combined systolic and diastolic CHF (congestive heart failure) 7/24/2015    2-2017:  1 - Concentric LVH with mildly to moderately depressed left ventricular systolic function (EF 40-45%).    2 - Severe left atrial enlargement.    3 - Left ventricular diastolic dysfunction with increased LAP.    4 - AV sclerosis with mild aortic regurgitation.    5 - Mild to moderate mitral regurgitation.    6 - Mild tricuspid regurgitation.    7 - Pulmonary hypertension. 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   Endocrine: Positive for polyuria. Negative for polyphagia.   Psychiatric/Behavioral: Positive for agitation, behavioral problems, confusion, decreased concentration and sleep disturbance.       Objective:   BP (!) 122/52 (BP Location: Left arm, Patient Position: Sitting, BP Method: Manual)  Pulse (!) 57  Resp 18  Ht 5' 6" (1.676 m)  Wt 70.7 kg (155 lb 13.8 oz)  SpO2 97%  BMI 25.16 kg/m2    Physical Exam   Constitutional: He appears well-developed and well-nourished.   Ambulating with wheelchair   HENT:   Head: Normocephalic.   Cardiovascular:   bradycardic   Pulmonary/Chest: Effort normal.   Neurological:   Minimal responses to questions   Skin: Skin is warm and dry.   Psychiatric:   Poor eye contact   Nursing note and vitals reviewed.      Lab Results   Component Value Date    WBC 3.95 03/16/2017    HGB 12.2 (L) 03/16/2017    HCT 37.6 (L) 03/16/2017     03/16/2017    CHOL 112 (L) 01/13/2017    TRIG 80 01/13/2017    HDL 43 01/13/2017    ALT 21 03/16/2017    AST 37 03/16/2017     (L) 03/16/2017    K 4.5 03/16/2017    CL 99 03/16/2017    CREATININE 1.60 (H) 03/16/2017    BUN 19 03/16/2017    CO2 26 03/16/2017    TSH 1.477 01/14/2015    .74 (H) 10/21/2011    INR 1.3 (H) 02/07/2017    HGBA1C 5.7 01/13/2017         Assessment:   89 y.o. male with multiple co-morbid illnesses here to continue work-up of chronic issues notably dementia, UTI, prostate cancer.     Plan: "     Problem List Items Addressed This Visit        Neuro    Vascular dementia without behavioral disturbance     Recent encephalopathy in 2/2017 due to AMS  · Continue donepezil  · Complex case management consult for assistance  · Family pursuing [facility] placement vs hospice  · Prefer additional options         Relevant Orders    Ambulatory referral to Outpatient Case Management       Renal    Prostate cancer     Patient refusing treatment, PET neg 2/2017  · No monitoring warranted  · Discontinue lupron  · Undergoing treatment for UTI  · CM consult for NH vs hospice         Relevant Orders    Ambulatory referral to Outpatient Case Management    Acute cystitis without hematuria     Seen in ED 3/16/17, completed cipro BID   · Monitor clinically           Other Visit Diagnoses     Dementia with behavioral disturbance, unspecified dementia type    -  Primary    Relevant Orders    Ambulatory referral to Outpatient Case Management          Health Maintenance       Date Due Completion Date    Zoster Vaccine 12/27/1984 ---    Eye Exam 6/27/2017 6/27/2016    Hemoglobin A1c 7/13/2017 1/13/2017    Pneumococcal (65+) (2 of 2 - PPSV23) 1/13/2018 1/13/2017    Lipid Panel 1/13/2018 1/13/2017    Foot Exam 2/3/2018 2/3/2017 (Done)    Override on 2/3/2017: Done    PROSTATE-SPECIFIC ANTIGEN 2/5/2018 2/5/2017    TETANUS VACCINE 2/27/2027 2/27/2017          Return in about 4 weeks (around 4/24/2017) for established patient follow-up. . One hour spent with this patient today, half of that in counseling.    Mandie Sales MD/MPH  Internal Medicine  [facility]  [phone]

## 2017-03-27 NOTE — ASSESSMENT & PLAN NOTE
Recent encephalopathy in 2/2017 due to AMS  · Continue donepezil  · Complex case management consult for assistance  · Family pursuing [facility] placement vs hospice  · Prefer additional options

## 2017-03-27 NOTE — ASSESSMENT & PLAN NOTE
Patient refusing treatment, PET neg 2/2017  · No monitoring warranted  · Discontinue lupron  · Undergoing treatment for UTI  · CM consult for NH vs hospice

## 2017-03-27 NOTE — MR AVS SNAPSHOT
[facility]  [address]  [facility] [address]  Phone: [phone]  Fax: [phone]                  Orlando Tran Jr.   3/27/2017 11:30 AM   Office Visit    Description:  Male : 1924   Provider:  Mandie Sales MD   Department:  [facility]           Reason for Visit     Follow-up     Claudication           Diagnoses this Visit        Comments    Dementia with behavioral disturbance, unspecified dementia type    -  Primary     Vascular dementia without behavioral disturbance         Prostate cancer         Acute cystitis without hematuria                To Do List           Future Appointments        Provider Department Dept Phone    2017 9:30 AM Mandie Sales MD [facility] [phone]      Goals (5 Years of Data)     None      Follow-Up and Disposition     Return in about 4 weeks (around 2017) for established patient follow-up.    Follow-up and Disposition History      [facility]     [facility] On Call Nurse [facility] -  Assistance  Registered nurses in the [facility] On Call Center provide clinical advisement, health education, appointment booking, and other advisory services.  Call for this free service at [phone].             Medications           Message regarding Medications     Verify the changes and/or additions to your medication regime listed below are the same as discussed with your clinician today.  If any of these changes or additions are incorrect, please notify your healthcare provider.             Verify that the below list of medications is an accurate representation of the medications you are currently taking.  If none reported, the list may be blank. If incorrect, please contact your healthcare provider. Carry this list with you in case of emergency.           Current Medications     allopurinol (ZYLOPRIM) 100 MG tablet Take 1 tablet (100 mg total) by mouth Daily.    aspirin 81 mg  "Tab Take 1 tablet by mouth once daily.     carvedilol (COREG) 12.5 MG tablet Take 1 tablet (12.5 mg total) by mouth 2 (two) times daily with meals.    donepezil (ARICEPT) 5 MG tablet Take 1 tablet (5 mg total) by mouth once daily.    doxazosin (CARDURA) 2 MG tablet Take 1 tablet (2 mg total) by mouth every evening.    furosemide (LASIX) 40 MG tablet Take 1 tablet (40 mg total) by mouth 2 (two) times daily.    hydrALAZINE (APRESOLINE) 50 MG tablet Take 1 tablet (50 mg total) by mouth every 12 (twelve) hours.    sodium chloride (SALINE NASAL) 0.65 % nasal spray 1 spray by Nasal route as needed for Congestion.           Clinical Reference Information           Your Vitals Were     BP Pulse Resp Height Weight SpO2    122/52 (BP Location: Left arm, Patient Position: Sitting, BP Method: Manual) 57 18 5' 6" (1.676 m) 70.7 kg (155 lb 13.8 oz) 97%    BMI                25.16 kg/m2          Blood Pressure          Most Recent Value    BP  (!)  122/52      Allergies as of 3/27/2017     No Known Drug Allergies      Immunizations Administered on Date of Encounter - 3/27/2017     None      Orders Placed During Today's Visit      Normal Orders This Visit    Ambulatory referral to Outpatient Case Management       Instructions    TODAY:  - case management consult for hospice and nursing home options  - discontinue lupron injections       Language Assistance Services     ATTENTION: Language assistance services are available, free of charge. Please call [phone].      ATENCIÓN: Si habla español, tiene a marcelo disposición servicios gratuitos de asistencia lingüística. Llame al [phone].     [facility] Ý: N?u b?n nói Ti?ng Vi?t, có các d?ch v? h? tr? ngôn ng? mi?n phí dành cho b?n. G?i s? [phone].         [facility] complies with applicable Federal civil rights laws and does not discriminate on the basis of race, color, national origin, age, disability, or sex.        "

## 2017-03-28 ENCOUNTER — OUTPATIENT CASE MANAGEMENT (OUTPATIENT)
Dept: ADMINISTRATIVE | Facility: OTHER | Age: 82
End: 2017-03-28

## 2017-03-28 NOTE — PROGRESS NOTES
Please note the following patient has been assigned to BRYON Tavera with Outpatient Complex Care Mgmt for screening.    Please contact [facility] at ext [phone] with questions.    Thank you,  Haley Escobedo, SSC

## 2017-03-31 ENCOUNTER — OUTPATIENT CASE MANAGEMENT (OUTPATIENT)
Dept: ADMINISTRATIVE | Facility: OTHER | Age: 82
End: 2017-03-31

## 2017-03-31 NOTE — PROGRESS NOTES
Attempt #:  1  This CSW attempted to reach patient/caregiver Pawan to provide resource and left a message requesting a return call.

## 2017-04-03 ENCOUNTER — OUTPATIENT CASE MANAGEMENT (OUTPATIENT)
Dept: ADMINISTRATIVE | Facility: OTHER | Age: 82
End: 2017-04-03

## 2017-04-03 NOTE — PROGRESS NOTES
This CSW received a referral on the above patient.   Reason for referral:resources for hospice and nursing home options  Name of the community resource that was provided:Senior Resource Guide, [place] Medicaid Long Term Care Program  Resource given to:Patient son  Pawan Tran    This CSW received a return call from patient son. Patient son reports that he is looking for options for hospice and nursing home options. Patient son reports that he has been in contact with [facility] regarding nursing home options. This CSW gave son nursing home options within the [facility].  Patient son reports that he is leaning [facility] for nursing home facility, however would like to explore all options. Patient son also reports that  Karina with [facility] has also given him nursing home options. Son reports that he has not had an opportunity to review information. This CSW also advised son that hospice care can also be offered in nursing facility. This CSW ask patient son if patient has applied for long term care with Medicaid. Patient son reports that he is not sure if patient would qualify for Medicaid. This CSW mailed son criteria for long term care and nursing home options. This CSW will follow up with patient son one week from today.      Follow up 11:30 This CSW contacted [facility] to speak with Irving Cantrell [phone]. Per Leda  in the field. This CSW left contact information with Britt for a return call.

## 2017-04-10 ENCOUNTER — OUTPATIENT CASE MANAGEMENT (OUTPATIENT)
Dept: ADMINISTRATIVE | Facility: OTHER | Age: 82
End: 2017-04-10

## 2017-04-10 ENCOUNTER — TELEPHONE (OUTPATIENT)
Dept: INTERNAL MEDICINE | Facility: CLINIC | Age: 82
End: 2017-04-10

## 2017-04-10 DIAGNOSIS — Z11.1 SCREENING-PULMONARY TB: Primary | ICD-10-CM

## 2017-04-10 NOTE — TELEPHONE ENCOUNTER
* Called Pt son to verify information on Documents  And to inform him that his father is going to need a PPD .

## 2017-04-10 NOTE — TELEPHONE ENCOUNTER
Pt son is bringing Pt in on tomorrow for PPD and PT should have it read on Thursday .      Thanks Dr. Sales

## 2017-04-10 NOTE — TELEPHONE ENCOUNTER
He can go to lab and have the quantiferon gold lab drawn, so that he does not have to come back for PPD reading.    Could you have them stop by our office in the AM so that I can finish the paperwork (if you weren't able to go over those questions with the son). We should have a spot at 7:30 and 9am.    Thanks,  LINDA

## 2017-04-10 NOTE — TELEPHONE ENCOUNTER
Pt son was not to familiar with [facility] , but Pt son will bring Pt tomorrow to get lab draw, I insisted that the lab draw would be quicker for Pt. Vs them traveling back and fourth   , Physical Status has been reviewed and will be placed on your desk for further review .       Thanks Dr. Sales

## 2017-04-10 NOTE — PROGRESS NOTES
Follow Up  Reason for referral: Nursing home placement      This CSW attempted to follow up with patient caregiver no answer. This CSW left a message requesting a return call.

## 2017-04-10 NOTE — TELEPHONE ENCOUNTER
He can do a PPD (which he will need to come back to have read in 48-72 hrs), or we can do a lab draw for quantiferon gold. I put in the order for the lab draw for quantiferon because it is easier, and they can have it drawn close to their home. Please set them up with that appt. Regardless he needs to get screened for TB.    Thanks,  LINDA

## 2017-04-11 ENCOUNTER — TELEPHONE (OUTPATIENT)
Dept: INTERNAL MEDICINE | Facility: CLINIC | Age: 82
End: 2017-04-11

## 2017-04-11 ENCOUNTER — OUTPATIENT CASE MANAGEMENT (OUTPATIENT)
Dept: ADMINISTRATIVE | Facility: OTHER | Age: 82
End: 2017-04-11

## 2017-04-11 ENCOUNTER — LAB VISIT (OUTPATIENT)
Dept: LAB | Facility: HOSPITAL | Age: 82
End: 2017-04-11
Attending: INTERNAL MEDICINE
Payer: MEDICARE

## 2017-04-11 DIAGNOSIS — Z11.1 SCREENING-PULMONARY TB: ICD-10-CM

## 2017-04-11 PROCEDURE — 86480 TB TEST CELL IMMUN MEASURE: CPT | Mod: PO

## 2017-04-11 PROCEDURE — 36415 COLL VENOUS BLD VENIPUNCTURE: CPT | Mod: PO

## 2017-04-11 NOTE — TELEPHONE ENCOUNTER
Pt son called in and would like letter from 09/19/2015 updated and would like a copy to supply to [facility] .

## 2017-04-11 NOTE — TELEPHONE ENCOUNTER
----- Message from BRYON Kaiser sent at 4/11/2017  9:36 AM CDT -----  This CSW received a referral on the above patient. This CSW provided caregiver with the following resource: This CSW received a return call from patient son. Patient son reports that he is looking for options for hospice and nursing home options. Patient son reports that he has been in contact with [facility] regarding nursing home options. This CSW gave son nursing home options within the [facility].  Patient son reports that he is leaning towards [facility] for nursing home facility, however would like to explore all options. Patient [facility] also reports [facility] has also given him nursing home options. Son reports that he has not had an opportunity to review information. This CSW also advised son that hospice care can also be offered in nursing facility. This CSW ask patient son if patient has applied for long term care with Medicaid. Patient son reports that he is not sure if patient would qualify for Medicaid. This CSW mailed son criteria for long term care and nursing home options.         Thank you for the referral,    BRYON Tavera

## 2017-04-13 LAB
MITOGEN NIL: 0.63 IU/ML
NIL: 0.2 IU/ML
TB ANTIGEN NIL: 0 IU/ML
TB ANTIGEN: 0.2 IU/ML
TB GOLD: NEGATIVE

## 2017-04-20 ENCOUNTER — TELEPHONE (OUTPATIENT)
Dept: INTERNAL MEDICINE | Facility: CLINIC | Age: 82
End: 2017-04-20

## 2017-04-20 NOTE — TELEPHONE ENCOUNTER
Spoke with pt son and he stated pt needs a new eval letter from Dr. MCKEON because the one from Dr. Toussaint is no longer good. Pt son stated that in the letter it needs to include his diet which is low sodium diet. He also stated they will be in on Monday for pt appt to further discuss.

## 2017-04-20 NOTE — TELEPHONE ENCOUNTER
Perfect, we will do dementia eval at that time, and can provide him with a similar letter (but with updated information) as Dr Toussaint provided in 2015.    Thanks,  KJ

## 2017-04-24 ENCOUNTER — OFFICE VISIT (OUTPATIENT)
Dept: INTERNAL MEDICINE | Facility: CLINIC | Age: 82
End: 2017-04-24
Payer: MEDICARE

## 2017-04-24 VITALS
OXYGEN SATURATION: 98 % | DIASTOLIC BLOOD PRESSURE: 60 MMHG | SYSTOLIC BLOOD PRESSURE: 132 MMHG | BODY MASS INDEX: 23.59 KG/M2 | HEART RATE: 72 BPM | HEIGHT: 66 IN | WEIGHT: 146.81 LBS

## 2017-04-24 DIAGNOSIS — F01.50 VASCULAR DEMENTIA WITHOUT BEHAVIORAL DISTURBANCE: ICD-10-CM

## 2017-04-24 DIAGNOSIS — C61 PROSTATE CANCER: ICD-10-CM

## 2017-04-24 PROCEDURE — 1159F MED LIST DOCD IN RCRD: CPT | Mod: S$GLB,,, | Performed by: INTERNAL MEDICINE

## 2017-04-24 PROCEDURE — 99215 OFFICE O/P EST HI 40 MIN: CPT | Mod: S$GLB,,, | Performed by: INTERNAL MEDICINE

## 2017-04-24 PROCEDURE — 1126F AMNT PAIN NOTED NONE PRSNT: CPT | Mod: S$GLB,,, | Performed by: INTERNAL MEDICINE

## 2017-04-24 PROCEDURE — 1160F RVW MEDS BY RX/DR IN RCRD: CPT | Mod: S$GLB,,, | Performed by: INTERNAL MEDICINE

## 2017-04-24 PROCEDURE — 99999 PR PBB SHADOW E&M-EST. PATIENT-LVL III: CPT | Mod: PBBFAC,,, | Performed by: INTERNAL MEDICINE

## 2017-04-24 PROCEDURE — 1157F ADVNC CARE PLAN IN RCRD: CPT | Mod: S$GLB,,, | Performed by: INTERNAL MEDICINE

## 2017-04-24 NOTE — ASSESSMENT & PLAN NOTE
GFR 42, A1c 5.7. Followed by nephrology  · Continue clinically monitoring with PCP  · Avoid nephrotoxic meds  · Patient not checking CBG

## 2017-04-24 NOTE — ASSESSMENT & PLAN NOTE
Patient refusing treatment, PET neg 2/2017  · No monitoring warranted  · Discontinue lupron  · completed treatment for UTI  · [facility]

## 2017-04-24 NOTE — PROGRESS NOTES
"Primary Care Provider Appointment    Subjective:      Patient ID: Orlando Tran Jr. is a 89 y.o. male dementia, HTN, prostate cancer    Chief Complaint: Follow-up (6week) and Dementia  Patient here for dementia evaluation to document for nursing home admission. He is here with his son. His quantiferon gold testing was negative. [facility]. Patient's son prefers to refer to [facility] as the [facility].     His son reports recent weight loss, he is worried about his father's decline. His son reports noncompliance with medications, "stubborn" behavior.     Patient here for mini-cog assessment. He reports, "I'm doing all right." He scored a zero on the mini-cog evaluation of 3 word recall and clock drawing.      Past Surgical History:   Procedure Laterality Date    CORONARY ARTERY BYPASS GRAFT  12-13-01    CORONARY STENT PLACEMENT         Past Medical History:   Diagnosis Date    Chronic combined systolic and diastolic CHF (congestive heart failure) 7/24/2015 2-2017:  1 - Concentric LVH with mildly to moderately depressed left ventricular systolic function (EF 40-45%).    2 - Severe left atrial enlargement.    3 - Left ventricular diastolic dysfunction with increased LAP.    4 - AV sclerosis with mild aortic regurgitation.    5 - Mild to moderate mitral regurgitation.    6 - Mild tricuspid regurgitation.    7 - Pulmonary hypertension. 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   Ambulating with wheelchair   HENT:   Head: Normocephalic.   Cardiovascular:   bradycardic   Pulmonary/Chest: Effort normal.   Neurological:   Minimal responses to questions   Skin: Skin is warm and dry.   Psychiatric:   Poor eye contact   Nursing note and vitals reviewed.      Lab Results   Component Value Date    WBC 3.95 03/16/2017    HGB 12.2 (L) 03/16/2017    HCT 37.6 (L) 03/16/2017     03/16/2017    CHOL 112 (L) 01/13/2017    TRIG 80 01/13/2017    HDL 43 01/13/2017    ALT 21 03/16/2017    AST 37 03/16/2017     (L) 03/16/2017    K 4.5 03/16/2017    CL 99 03/16/2017    CREATININE 1.60 (H) 03/16/2017    BUN 19 03/16/2017    CO2 26 03/16/2017    TSH 1.477 01/14/2015    .74 (H) 10/21/2011    INR 1.3 (H) 02/07/2017    HGBA1C 5.7 01/13/2017         Assessment:   89 y.o. male with multiple co-morbid illnesses here to continue work-up of chronic issues notably dementia, HTN, prostate cancer.     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 Followed by nephrology  · Continue clinically monitoring with PCP  · Avoid nephrotoxic meds  · Patient not checking CBG         Prostate cancer     Patient refusing treatment, PET neg 2/2017  · No monitoring " warranted  · Discontinue lupron  · completed treatment for UTI  · [facility]               Health Maintenance       Date Due Completion Date    Zoster Vaccine 12/27/1984 ---    Eye Exam 6/27/2017 6/27/2016    Hemoglobin A1c 7/13/2017 1/13/2017    Pneumococcal (65+) (2 of 2 - PPSV23) 1/13/2018 1/13/2017    Lipid Panel 1/13/2018 1/13/2017    Foot Exam 2/3/2018 2/3/2017 (Done)    Override on 2/3/2017: Done    PROSTATE-SPECIFIC ANTIGEN 2/5/2018 2/5/2017    TETANUS VACCINE 2/27/2027 2/27/2017          Return in about 6 weeks (around 6/5/2017), or if symptoms worsen or fail to improve, for established patient follow-up. . One hour spent with this patient today, half of that in counseling.    Mandie Sales MD/MPH  Internal Medicine  [facility]  [phone]

## 2017-04-24 NOTE — ASSESSMENT & PLAN NOTE
Recent encephalopathy in 2/2017 due to AMS  · Continue donepezil  · Complex case management consult for assistance  · [facility]  · Mini-cog evaluation today with score of zero

## 2017-04-24 NOTE — LETTER
"   [facility]  [address]  [facility] [address]  Phone: [phone]  Fax: [phone]   04/24/2017          Orlando Tran Jr.  [address] [address]        To Whom It May Concern:    Mr Tran is diagnosed with dementia. This was captured during his exam on 4/24/17 with the mini-cog evaluation. He had perfect 3 work immediate repeat back, but could not recall any of the words after one minute. He could not draw a clock with the time "10 past 11". This is quantified in the mini-cog evaluation as positive for dementia, due to a score of zero.    Please contact me if you need any additional information.      Best regards,        Mandie Sales MD/MPH  Internal Medicine  [facility]  [phone]    "

## 2017-05-17 ENCOUNTER — OUTSIDE PLACE OF SERVICE (OUTPATIENT)
Dept: FAMILY MEDICINE | Facility: CLINIC | Age: 82
End: 2017-05-17
Payer: MEDICARE

## 2017-05-17 PROCEDURE — 99305 1ST NF CARE MODERATE MDM 35: CPT | Mod: ,,, | Performed by: FAMILY MEDICINE

## 2017-06-02 ENCOUNTER — TELEPHONE (OUTPATIENT)
Dept: INTERNAL MEDICINE | Facility: CLINIC | Age: 82
End: 2017-06-02

## 2017-06-02 NOTE — TELEPHONE ENCOUNTER
Pt son has been advised that Pt does not need to continue to come for appts. , Pt son has been advised that if he needs anything to please reach out to us .    Thanks Dr. Sales

## 2017-06-02 NOTE — TELEPHONE ENCOUNTER
Is he at a [facility] nursing [facility], does a doctor see him there? If he does not know, then please get the name of the NH and call them to find out.    Thanks,  KJ

## 2017-06-02 NOTE — TELEPHONE ENCOUNTER
He does not need to continue coming here for his appointments. Please advise his son that if he needs anything to reach out to us.    Thanks,  KJ

## 2017-06-02 NOTE — TELEPHONE ENCOUNTER
[facility] -     Dr. Jerry Anderson will be taking over care of Pt , Pt son stated that this provider is also in the [facility] network .    Please advise

## 2017-06-02 NOTE — TELEPHONE ENCOUNTER
Pt son stated that Pt is currently in a nursing home and he would like to know if a visit is necessary on Monday ?      Please advise

## 2017-07-24 DIAGNOSIS — E11.9 TYPE 2 DIABETES MELLITUS WITHOUT COMPLICATION: ICD-10-CM

## 2019-01-03 ENCOUNTER — TELEPHONE (OUTPATIENT)
Dept: INTERNAL MEDICINE | Facility: CLINIC | Age: 84
End: 2019-01-03

## 2022-10-24 NOTE — TELEPHONE ENCOUNTER
Pt son has Question about re starting  injections at cancer center , Pt appt is for 10: 30 AM today  and Pt son is not really for it and would like your input if its neccessary  . Pt would also like to talk about getting home health involved more due his father not being clean , Pt son stated that Pt is getting freq UTI's due to poor hygiene  .     Please advise   6

## 2024-07-02 NOTE — ASSESSMENT & PLAN NOTE
Pt to ED with c/o RLQ abdominal pain that radiates to his back onset 0345. Pt reports \"some nausea and sweating with the pain.\" Pt states he is having \"some difficulty urinating like a pressure.\" States he took 2 ibuprofen and 1 antacid with no relief.    Recent encephalopathy in 2/2017  · Continue donepezil